# Patient Record
Sex: MALE | Race: WHITE | NOT HISPANIC OR LATINO | Employment: OTHER | URBAN - METROPOLITAN AREA
[De-identification: names, ages, dates, MRNs, and addresses within clinical notes are randomized per-mention and may not be internally consistent; named-entity substitution may affect disease eponyms.]

---

## 2017-05-15 ENCOUNTER — HOSPITAL ENCOUNTER (OUTPATIENT)
Dept: LAB | Facility: HOSPITAL | Age: 52
Discharge: HOME | End: 2017-05-15
Attending: INTERNAL MEDICINE | Admitting: INTERNAL MEDICINE

## 2017-05-15 LAB
ALBUMIN SERPL-MCNC: 3.9 G/DL (ref 3.5–4.8)
ALBUMIN/GLOB SERPL: 1.2 {RATIO} (ref 1–1.7)
ALP SERPL-CCNC: 76 IU/L (ref 32–91)
ALT SERPL-CCNC: 21 IU/L (ref 17–63)
ANION GAP SERPL CALC-SCNC: 13.7 MMOL/L (ref 10–20)
AST SERPL-CCNC: 20 IU/L (ref 15–41)
BASOPHILS # BLD AUTO: 0.1 10*3/UL (ref 0–0.2)
BASOPHILS NFR BLD AUTO: 1 % (ref 0–2)
BILIRUB SERPL-MCNC: 0.6 MG/DL (ref 0.3–1.2)
BUN SERPL-MCNC: 11 MG/DL (ref 8–20)
BUN/CREAT SERPL: 10 (ref 6.2–20.3)
CALCIUM SERPL-MCNC: 9.4 MG/DL (ref 8.9–10.3)
CHLORIDE SERPL-SCNC: 104 MMOL/L (ref 101–111)
CONV CO2: 26 MMOL/L (ref 22–32)
CONV TOTAL PROTEIN: 7.1 G/DL (ref 6.1–7.9)
CREAT UR-MCNC: 1.1 MG/DL (ref 0.7–1.2)
DIFFERENTIAL METHOD BLD: (no result)
EOSINOPHIL # BLD AUTO: 0.1 10*3/UL (ref 0–0.3)
EOSINOPHIL # BLD AUTO: 1 % (ref 0–3)
ERYTHROCYTE [DISTWIDTH] IN BLOOD BY AUTOMATED COUNT: 13.1 % (ref 11.5–14.5)
GLOBULIN UR ELPH-MCNC: 3.2 G/DL (ref 2.5–3.8)
GLUCOSE SERPL-MCNC: 114 MG/DL (ref 65–99)
HCT VFR BLD AUTO: 41.6 % (ref 40–54)
HGB BLD-MCNC: 14 G/DL (ref 14–18)
LYMPHOCYTES # BLD AUTO: 1.4 10*3/UL (ref 0.8–4.8)
LYMPHOCYTES NFR BLD AUTO: 14 % (ref 18–42)
MCH RBC QN AUTO: 29 PG (ref 26–32)
MCHC RBC AUTO-ENTMCNC: 33.7 G/DL (ref 32–36)
MCV RBC AUTO: 86.1 FL (ref 80–94)
MONOCYTES # BLD AUTO: 0.8 10*3/UL (ref 0.1–1.3)
MONOCYTES NFR BLD AUTO: 8 % (ref 2–11)
NEUTROPHILS # BLD AUTO: 7.7 10*3/UL (ref 2.3–8.6)
NEUTROPHILS NFR BLD AUTO: 76 % (ref 50–75)
NRBC BLD AUTO-RTO: 0 /100{WBCS}
NRBC/RBC NFR BLD MANUAL: 0 10*3/UL
PLATELET # BLD AUTO: 265 10*3/UL (ref 150–450)
PMV BLD AUTO: 8.6 FL (ref 7.4–10.4)
POTASSIUM SERPL-SCNC: 3.7 MMOL/L (ref 3.6–5.1)
RBC # BLD AUTO: 4.83 10*6/UL (ref 4.6–6)
SODIUM SERPL-SCNC: 140 MMOL/L (ref 136–144)
WBC # BLD AUTO: 9.9 10*3/UL (ref 4.5–11.5)

## 2017-12-04 ENCOUNTER — HOSPITAL ENCOUNTER (OUTPATIENT)
Dept: SLEEP MEDICINE | Facility: HOSPITAL | Age: 52
Discharge: HOME | End: 2017-12-04
Attending: PSYCHIATRY & NEUROLOGY | Admitting: PSYCHIATRY & NEUROLOGY

## 2017-12-13 ENCOUNTER — HOSPITAL ENCOUNTER (OUTPATIENT)
Dept: SLEEP MEDICINE | Facility: HOSPITAL | Age: 52
Discharge: HOME | End: 2017-12-13
Attending: PSYCHIATRY & NEUROLOGY | Admitting: PSYCHIATRY & NEUROLOGY

## 2018-07-06 ENCOUNTER — HOSPITAL ENCOUNTER (OUTPATIENT)
Dept: NEUROLOGY | Facility: HOSPITAL | Age: 53
Discharge: HOME | End: 2018-07-06
Attending: INTERNAL MEDICINE | Admitting: INTERNAL MEDICINE

## 2018-07-06 ENCOUNTER — OUTSIDE FACILITY SERVICE (OUTPATIENT)
Dept: NEUROLOGY | Facility: CLINIC | Age: 53
End: 2018-07-06

## 2018-07-06 PROCEDURE — 95886 MUSC TEST DONE W/N TEST COMP: CPT | Performed by: PSYCHIATRY & NEUROLOGY

## 2018-07-06 PROCEDURE — 95910 NRV CNDJ TEST 7-8 STUDIES: CPT | Performed by: PSYCHIATRY & NEUROLOGY

## 2019-05-10 ENCOUNTER — CONVERSION ENCOUNTER (OUTPATIENT)
Dept: FAMILY MEDICINE CLINIC | Facility: CLINIC | Age: 54
End: 2019-05-10

## 2019-06-04 VITALS
HEIGHT: 70 IN | WEIGHT: 237.4 LBS | RESPIRATION RATE: 20 BRPM | HEART RATE: 82 BPM | SYSTOLIC BLOOD PRESSURE: 114 MMHG | DIASTOLIC BLOOD PRESSURE: 82 MMHG | BODY MASS INDEX: 33.99 KG/M2

## 2019-06-06 NOTE — PROGRESS NOTES
Vital Signs:    Patient Profile:    54 Years Old Male  Height:     70 inches  Weight:     237.4 pounds  BMI:        34.06     Temp:       97.9 degrees F oral  Pulse rate: 82 / minute  Resp:       20 per minute  BP Sittin / 82  (left arm)    Cuff size:  large      Problems: Active problems were reviewed with the patient during this visit.  Medications: Medications were reviewed with the patient during this visit.  Allergies: Allergies were reviewed with the patient during this visit.        Vitals Entered By: Mayi Sanches CMA (May 10, 2019 9:03 AM)      Referring Provider:  Lynnette Tomlinson MD  Primary Provider:  Lynnette Tomlinson    CC:  3 mos f/u of ADD and depression.    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 Pederson)             Past Surgical History:     Reviewed history from 2017 and no changes required:        Left eye surgery, Cogan's dystrophy        Cardiac cath., normal. E.F. 55-60% ()         Cardiac Ablation 2017    Family History Summary:      Reviewed history Last on 2019 and no changes required:2019  Brother - Has Family History of Other Medical Problems - car accident () - Entered On: 2017  Half Sister - Has Family History of Other Medical Problems - glaucoma - Entered On: 2015  Half Brother - Has Family History of Heart Disease - Entered On: 2015  Mother - Has Family  History of Other Medical Problems - brain tumor, glaucoma - Entered On: 9/22/2017  Son - Has Family History of Other Medical Problems - narcolepsy  - Entered On: 11/19/2015  Mother - Has Family History of Diabetes - Entered On: 9/22/2017  Father - Has Family History of High Cholesterol - Entered On: 9/22/2017  Father - Has Family History of Hypertension - Entered On: 11/19/2015  Father - Has Family History of Depression - Entered On: 9/22/2017  Mother - Has Family History of Heart Disease - CABG ~ age 45 - Entered On: 9/22/2017    General Comments - FH:  Children: 1 daughter - healthy  Siblings: 1 half sister - healthy    Social History:     Reviewed history from 07/13/2018 and no changes required:        Patient has never smoked.        Passive Smoke: Y        Alcohol Use: Y        Drug Use: N        HIV/High Risk: N        Regular Exercise: N        Hx Domestic Abuse: N        Voodoo Affecting Care: N                Alcohol Use: Y        Risk Factors:     Smoked Tobacco Use:  Never smoker  Smokeless Tobacco Use:  Never  Passive smoke exposure:  yes  Drug use:  no  HIV high-risk behavior:  no  Caffeine use:  0 drinks per day  Alcohol use:  yes     Drinks per day:  social     Has patient --        Felt need to cut down:  no        Been annoyed by complaints:  no        Felt guilty about drinking:  no        Needed eye opener in the morning:  no     Counseled to quit/cut down alcohol use:  no  Exercise:  no  Seatbelt use:  100 %  Sun Exposure:  occasionally    Previous Tobacco Use: Signed On - 02/26/2019  Smoked Tobacco Use:  Never smoker  Smokeless Tobacco Use:  Never  Passive smoke exposure:  yes  Drug use:  no  HIV high-risk behavior:  no  Caffeine use:  0 drinks per day    Previous Alcohol Use: Signed On - 07/13/2018  Alcohol use:  yes     Drinks per day:  social     Has patient --        Felt need to cut down:  no        Been annoyed by complaints:  no        Felt guilty about drinking:  no        Needed eye  opener in the morning:  no     Counseled to quit/cut down alcohol use:  no  Exercise:  no  Seatbelt use:  100 %  Sun Exposure:  occasionally        Physical Exam   Height:  70  Weight:  238  BP:  114/82 mm HG    Medication List:  ADDERALL 10 MG ORAL TABLET (AMPHETAMINE-DEXTROAMPHETAMINE) one tab daily in PM  AZELASTINE HCL 0.15 % NASAL SOLUTION (AZELASTINE HCL) 1-2 sprays in each nostril twice a day  FLONASE ALLERGY RELIEF 50 MCG/ACT NASAL SUSPENSION (FLUTICASONE PROPIONATE) 2 sprays once a day  GABAPENTIN 100MG CAPSULES (GABAPENTIN) TAKE 1 CAPSULE BY MOUTH THREE TIMES DAILY  LOSARTAN 100MG TABLETS (LOSARTAN POTASSIUM) TAKE 1 TABLET BY MOUTH EVERY DAY.  TAMSULOSIN 0.4MG CAPSULES (TAMSULOSIN HCL) TAKE 1 CAPSULE BY MOUTH EVERY DAY  TRINTELLIX 20MG TB (WAS BRINTELLIX) (VORTIOXETINE HBR) TAKE 1 TABLET BY MOUTH EVERY DAY  KLONOPIN 1 MG ORAL TABLET (CLONAZEPAM) one tab at bedtime as needed  ADDERALL XR 30 MG ORAL CAPSULE EXTENDED RELEASE 24 HOUR (AMPHETAMINE-DEXTROAMPHETAMINE) one capsule daily in the morning  [facility] OMEPRAZOLE 20 MG ORAL TABLET DELAYED RELEASE (OMEPRAZOLE) 1 tablet qd      Surgical History   Left eye surgery, Cogan's dystrophy  Cardiac cath., normal. E.F. 55-60% (2011)   Cardiac Ablation 5/2017,    Risk Factors  Tobacco Use: Never smoker  Passive smoke exposure: yes  Exercise: no  Illicit Drug use: no      Physical Examination   General Appearance   In no acute distress.  Alert & oriented.  Behavior and affect appropriate to situation  HEENT   PERRLA, EOMI, TM's normal.  Pharynx clear  Cardiovascular   Regular rate and rhythm  Lungs   Clear to auscultation        Impression & Recommendations:    Problem # 1:  DEPRESSION (ICD-296.30) (ILU18-T55.9)  Assessment: Unchanged    Problem # 2:  ADD (ICD-314.00) (ZVD89-S69.0)  Assessment: Deteriorated  add adderall 10pm qpm    Problem # 3:  HTN (ICD-401.9) (TFM80-D31)  Assessment: Unchanged  he has not been told by pharmacy about recall  advised pt to f/u with  pharmacist  His updated medication list for this problem includes:     Losartan 100mg Tablets (Losartan potassium) ..... Take 1 tablet by mouth every day.      Medications Added to Medication List This Visit:  1)  Adderall 10 Mg Oral Tablet (Amphetamine-dextroamphetamine) .... One tab daily in pm  2)  Flonase Allergy Relief 50 Mcg/act Nasal Suspension (Fluticasone propionate) .... 2 sprays once a day      Patient Instructions:  1)  During this office visit we discussed the pertinent aspects of the visit and the treatment recommendations.  Patient was given the opportunity to ask questions and discuss other concerns.                Medication Administration    Orders Added:  1)  Ofc Vst, Est Level III [07961]        Electronically signed by Lynnette Tomlinson on 05/28/2019 at 6:15 PM  ________________________________________________________________________       Disclaimer: Converted Note message may not contain all data elements that existed in the legacy source system. Please see [employer] Legacy System for the original note details.

## 2019-06-17 ENCOUNTER — OFFICE VISIT (OUTPATIENT)
Dept: FAMILY MEDICINE CLINIC | Facility: CLINIC | Age: 54
End: 2019-06-17

## 2019-06-17 VITALS
OXYGEN SATURATION: 97 % | BODY MASS INDEX: 34.35 KG/M2 | SYSTOLIC BLOOD PRESSURE: 144 MMHG | WEIGHT: 245.4 LBS | HEIGHT: 71 IN | DIASTOLIC BLOOD PRESSURE: 92 MMHG | TEMPERATURE: 98.2 F | RESPIRATION RATE: 20 BRPM | HEART RATE: 79 BPM

## 2019-06-17 DIAGNOSIS — I10 ESSENTIAL HYPERTENSION: ICD-10-CM

## 2019-06-17 DIAGNOSIS — N52.9 ERECTILE DYSFUNCTION, UNSPECIFIED ERECTILE DYSFUNCTION TYPE: Primary | ICD-10-CM

## 2019-06-17 PROCEDURE — 99213 OFFICE O/P EST LOW 20 MIN: CPT | Performed by: INTERNAL MEDICINE

## 2019-06-17 RX ORDER — LOTEPREDNOL ETABONATE 5 MG/G
GEL OPHTHALMIC
Refills: 3 | COMMUNITY
Start: 2019-05-30 | End: 2019-06-17

## 2019-06-17 RX ORDER — LOSARTAN POTASSIUM 100 MG/1
TABLET ORAL EVERY 24 HOURS
COMMUNITY
Start: 2017-12-27 | End: 2020-04-09

## 2019-06-17 RX ORDER — DEXTROAMPHETAMINE SACCHARATE, AMPHETAMINE ASPARTATE, DEXTROAMPHETAMINE SULFATE AND AMPHETAMINE SULFATE 2.5; 2.5; 2.5; 2.5 MG/1; MG/1; MG/1; MG/1
TABLET ORAL
COMMUNITY
Start: 2019-05-10 | End: 2019-06-18 | Stop reason: SDUPTHER

## 2019-06-17 RX ORDER — GABAPENTIN 100 MG/1
CAPSULE ORAL
Refills: 1 | COMMUNITY
Start: 2019-04-28 | End: 2019-06-17

## 2019-06-17 RX ORDER — FLUTICASONE PROPIONATE 50 UG/1
2 SPRAY, METERED NASAL DAILY PRN
Refills: 2 | COMMUNITY
Start: 2019-04-11 | End: 2021-06-04

## 2019-06-17 RX ORDER — TAMSULOSIN HYDROCHLORIDE 0.4 MG/1
1 CAPSULE ORAL EVERY 24 HOURS
COMMUNITY
Start: 2018-03-02 | End: 2019-10-02 | Stop reason: SDUPTHER

## 2019-06-17 RX ORDER — NICOTINE POLACRILEX 4 MG/1
20 GUM, CHEWING ORAL DAILY
COMMUNITY
Start: 2015-11-17 | End: 2021-02-18

## 2019-06-17 RX ORDER — ALBUTEROL SULFATE 90 UG/1
AEROSOL, METERED RESPIRATORY (INHALATION)
COMMUNITY
Start: 2018-12-30 | End: 2021-02-18

## 2019-06-17 RX ORDER — CLONAZEPAM 1 MG/1
TABLET ORAL EVERY 24 HOURS
COMMUNITY
Start: 2017-09-22 | End: 2019-07-24 | Stop reason: SDUPTHER

## 2019-06-17 RX ORDER — GABAPENTIN 100 MG/1
CAPSULE ORAL
COMMUNITY
Start: 2018-09-27 | End: 2019-07-18 | Stop reason: SDUPTHER

## 2019-06-17 RX ORDER — DEXTROAMPHETAMINE SACCHARATE, AMPHETAMINE ASPARTATE MONOHYDRATE, DEXTROAMPHETAMINE SULFATE AND AMPHETAMINE SULFATE 7.5; 7.5; 7.5; 7.5 MG/1; MG/1; MG/1; MG/1
1 CAPSULE, EXTENDED RELEASE ORAL EVERY 24 HOURS
COMMUNITY
Start: 2017-07-13 | End: 2019-06-18 | Stop reason: SDUPTHER

## 2019-06-17 NOTE — PATIENT INSTRUCTIONS
Erectile Dysfunction  Erectile dysfunction (ED) is the inability to get or keep an erection in order to have sexual intercourse. Erectile dysfunction may include:  · Inability to get an erection.  · Lack of enough hardness of the erection to allow penetration.  · Loss of the erection before sex is finished.    What are the causes?  This condition may be caused by:  · Certain medicines, such as:  ? Pain relievers.  ? Antihistamines.  ? Antidepressants.  ? Blood pressure medicines.  ? Water pills (diuretics).  ? Ulcer medicines.  ? Muscle relaxants.  ? Drugs.  · Excessive drinking.  · Psychological causes, such as:  ? Anxiety.  ? Depression.  ? Sadness.  ? Exhaustion.  ? Performance fear.  ? Stress.  · Physical causes, such as:  ? Artery problems. This may include diabetes, smoking, liver disease, or atherosclerosis.  ? High blood pressure.  ? Hormonal problems, such as low testosterone.  ? Obesity.  ? Nerve problems. This may include back or pelvic injuries, diabetes mellitus, multiple sclerosis, or Parkinson disease.    What are the signs or symptoms?  Symptoms of this condition include:  · Inability to get an erection.  · Lack of enough hardness of the erection to allow penetration.  · Loss of the erection before sex is finished.  · Normal erections at some times, but with frequent unsatisfactory episodes.  · Low sexual satisfaction in either partner due to erection problems.  · A curved penis occurring with erection. The curve may cause pain or the penis may be too curved to allow for intercourse.  · Never having nighttime erections.    How is this diagnosed?  This condition is often diagnosed by:  · Performing a physical exam to find other diseases or specific problems with the penis.  · Asking you detailed questions about the problem.  · Performing blood tests to check for diabetes mellitus or to measure hormone levels.  · Performing other tests to check for underlying health conditions.  · Performing an  ultrasound exam to check for scarring.  · Performing a test to check blood flow to the penis.  · Doing a sleep study at home to measure nighttime erections.    How is this treated?  This condition may be treated by:  · Medicine taken by mouth to help you achieve an erection (oral medicine).  · Hormone replacement therapy to replace low testosterone levels.  · Medicine that is injected into the penis. Your health care provider may instruct you how to give yourself these injections at home.  · Vacuum pump. This is a pump with a ring on it. The pump and ring are placed on the penis and used to create pressure that helps the penis become erect.  · Penile implant surgery. In this procedure, you may receive:  ? An inflatable implant. This consists of cylinders, a pump, and a reservoir. The cylinders can be inflated with a fluid that helps to create an erection, and they can be deflated after intercourse.  ? A semi-rigid implant. This consists of two silicone rubber rods. The rods provide some rigidity. They are also flexible, so the penis can both curve downward in its normal position and become straight for sexual intercourse.  · Blood vessel surgery, to improve blood flow to the penis. During this procedure, a blood vessel from a different part of the body is placed into the penis to allow blood to flow around (bypass) damaged or blocked blood vessels.  · Lifestyle changes, such as exercising more, losing weight, and quitting smoking.    Follow these instructions at home:  Medicines  · Take over-the-counter and prescription medicines only as told by your health care provider. Do not increase the dosage without first discussing it with your health care provider.  · If you are using self-injections, perform injections as directed by your health care provider. Make sure to avoid any veins that are on the surface of the penis. After giving an injection, apply pressure to the injection site for 5 minutes.  General  instructions  · Exercise regularly, as directed by your health care provider. Work with your health care provider to lose weight, if needed.  · Do not use any products that contain nicotine or tobacco, such as cigarettes and e-cigarettes. If you need help quitting, ask your health care provider.  · Before using a vacuum pump, read the instructions that come with the pump and discuss any questions with your health care provider.  · Keep all follow-up visits as told by your health care provider. This is important.  Contact a health care provider if:  · You feel nauseous.  · You vomit.  Get help right away if:  · You are taking oral or injectable medicines and you have an erection that lasts longer than 4 hours. If your health care provider is unavailable, go to the nearest emergency room for evaluation. An erection that lasts much longer than 4 hours can result in permanent damage to your penis.  · You have severe pain in your groin or abdomen.  · You develop redness or severe swelling of your penis.  · You have redness spreading up into your groin or lower abdomen.  · You are unable to urinate.  · You experience chest pain or a rapid heart beat (palpitations) after taking oral medicines.  Summary  · Erectile dysfunction (ED) is the inability to get or keep an erection during sexual intercourse. This problem can usually be treated successfully.  · This condition is diagnosed based on a physical exam, your symptoms, and tests to determine the cause. Treatment varies depending on the cause, and may include medicines, hormone therapy, surgery, or vacuum pump.  · You may need follow-up visits to make sure that you are using your medicines or devices correctly.  · Get help right away if you are taking or injecting medicines and you have an erection that lasts longer than 4 hours.  This information is not intended to replace advice given to you by your health care provider. Make sure you discuss any questions you have with  your health care provider.  Document Released: 12/15/2001 Document Revised: 01/03/2018 Document Reviewed: 01/03/2018  Elsevier Interactive Patient Education © 2019 [employer]

## 2019-06-17 NOTE — PROGRESS NOTES
Subjective   Choco Bryant is a 54 y.o. male.     Pt c/o erectile dysfunction. Has trouble with both starting and maintaining erection. Has been occurring for some time, but worse recently. Only new med is addition of adderall in PM, which does seem to be helping with concentration.    In addition to ED, has trouble with depression, motivation. Meds help, but has not had testosterone checked before.    BP has been well controlled. No chest pain, SOA, headaches, dizziness.         The following portions of the patient's history were reviewed and updated as appropriate: allergies, current medications, past family history, past medical history, past social history, past surgical history and problem list.    Review of Systems   Constitutional: Negative for fatigue and fever.   HENT: Negative for congestion, ear pain, rhinorrhea and sore throat.    Eyes: Negative for blurred vision and itching.   Respiratory: Negative for cough and shortness of breath.    Cardiovascular: Negative for chest pain and palpitations.   Gastrointestinal: Negative for abdominal pain, diarrhea and vomiting.   Endocrine: Negative for polydipsia and polyuria.   Genitourinary: Positive for decreased libido and erectile dysfunction. Negative for dysuria, frequency, hematuria and urgency.   Musculoskeletal: Negative for joint swelling and myalgias.   Skin: Negative for rash and skin lesions.   Neurological: Negative for dizziness, numbness and headache.   Psychiatric/Behavioral: Positive for decreased concentration and depressed mood. The patient is nervous/anxious.          Current Outpatient Medications:   •  albuterol sulfate HFA (PROAIR HFA) 108 (90 Base) MCG/ACT inhaler, INL 2 PFS PO Q 4 H FOR UP TO 3 DAYS PRF WHZ OR SOB, Disp: , Rfl:   •  amphetamine-dextroamphetamine (ADDERALL) 10 MG tablet, ADDERALL 10 MG TABS, Disp: , Rfl:   •  amphetamine-dextroamphetamine XR (ADDERALL XR) 30 MG 24 hr capsule, 1 capsule Daily, Disp: , Rfl:   •  clonazePAM  "(KLONOPIN) 1 MG tablet, Daily., Disp: , Rfl:   •  FLONASE ALLERGY RELIEF 50 MCG/ACT nasal spray, USE 2 SPRAYS IN EACH NOSTRIL QD, Disp: , Rfl: 2  •  gabapentin (NEURONTIN) 100 MG capsule, TK 1 C PO TID, Disp: , Rfl:   •  losartan (COZAAR) 100 MG tablet, Daily., Disp: , Rfl:   •  Omeprazole ([facility]S OMEPRAZOLE) 20 MG tablet delayed-release, [facility]S OMEPRAZOLE 20 MG TBEC, Disp: , Rfl:   •  tamsulosin (FLOMAX) 0.4 MG capsule 24 hr capsule, 1 capsule Daily., Disp: , Rfl:   •  Vortioxetine HBr (TRINTELLIX) 20 MG tablet, TK 1 T PO QD, Disp: , Rfl:     Objective   /92 (BP Location: Left arm, Patient Position: Sitting, Cuff Size: Large Adult)   Pulse 79   Temp 98.2 °F (36.8 °C) (Oral)   Resp 20   Ht 179.1 cm (70.5\")   Wt 111 kg (245 lb 6.4 oz)   SpO2 97%   BMI 34.71 kg/m²   Physical Exam   Constitutional: He is oriented to person, place, and time. He appears well-developed and well-nourished. No distress.   HENT:   Head: Atraumatic.   Mouth/Throat: Oropharynx is clear and moist. No oropharyngeal exudate.   Eyes: Conjunctivae and EOM are normal. Right eye exhibits no discharge. Left eye exhibits no discharge. No scleral icterus.   Neck: Normal range of motion. Neck supple.   Cardiovascular: Normal rate, regular rhythm and normal heart sounds. Exam reveals no gallop and no friction rub.   No murmur heard.  Pulmonary/Chest: Effort normal and breath sounds normal. No stridor. No respiratory distress. He has no wheezes.   Musculoskeletal: Normal range of motion. He exhibits no edema or deformity.   Neurological: He is alert and oriented to person, place, and time.   Skin: Skin is warm and dry. No rash noted. He is not diaphoretic. No erythema.   Psychiatric: He has a normal mood and affect. His behavior is normal. Thought content normal.         Assessment/Plan   Problems Addressed this Visit     None      Visit Diagnoses     Erectile dysfunction, unspecified erectile dysfunction type    -  Primary    Relevant Orders    CBC " w AUTO Differential    PSA SCREENING    Testosterone (Free & Total), LC / MS    Essential hypertension        Relevant Medications    losartan (COZAAR) 100 MG tablet    Other Relevant Orders    CBC w AUTO Differential    Comprehensive metabolic panel    Lipid panel    TSH        1) HTN - repeat bp improved to 130/80. Cont current meds. Check labs.  2) ED - check labs. bc of several sxs of hypogonadism, if low, then will start testosterone injections. Discussed increased risk of heart disease and prostate issues, etc, with pt. If testosterone is normal, then will try viagra.         Procedures

## 2019-06-18 RX ORDER — DEXTROAMPHETAMINE SACCHARATE, AMPHETAMINE ASPARTATE MONOHYDRATE, DEXTROAMPHETAMINE SULFATE AND AMPHETAMINE SULFATE 7.5; 7.5; 7.5; 7.5 MG/1; MG/1; MG/1; MG/1
30 CAPSULE, EXTENDED RELEASE ORAL EVERY MORNING
Qty: 30 CAPSULE | Refills: 0 | Status: SHIPPED | OUTPATIENT
Start: 2019-06-18 | End: 2019-07-24 | Stop reason: SDUPTHER

## 2019-06-27 ENCOUNTER — LAB (OUTPATIENT)
Dept: LAB | Facility: HOSPITAL | Age: 54
End: 2019-06-27

## 2019-06-27 ENCOUNTER — TELEPHONE (OUTPATIENT)
Dept: FAMILY MEDICINE CLINIC | Facility: CLINIC | Age: 54
End: 2019-06-27

## 2019-06-27 DIAGNOSIS — N52.9 ERECTILE DYSFUNCTION, UNSPECIFIED ERECTILE DYSFUNCTION TYPE: ICD-10-CM

## 2019-06-27 DIAGNOSIS — I10 ESSENTIAL HYPERTENSION: ICD-10-CM

## 2019-06-27 LAB
ALBUMIN SERPL-MCNC: 3.7 G/DL (ref 3.5–4.8)
ALBUMIN/GLOB SERPL: 1.4 G/DL (ref 1–1.7)
ALP SERPL-CCNC: 90 U/L (ref 32–91)
ALT SERPL W P-5'-P-CCNC: 27 U/L (ref 17–63)
ANION GAP SERPL CALCULATED.3IONS-SCNC: 14.4 MMOL/L (ref 10–20)
ARTICHOKE IGE QN: 185 MG/DL (ref 0–100)
AST SERPL-CCNC: 17 U/L (ref 15–41)
BASOPHILS # BLD AUTO: 0 10*3/MM3 (ref 0–0.2)
BASOPHILS NFR BLD AUTO: 0.7 % (ref 0–1.5)
BILIRUB SERPL-MCNC: 0.7 MG/DL (ref 0.3–1.2)
BUN BLD-MCNC: 12 MG/DL (ref 8–20)
BUN/CREAT SERPL: 10.9 (ref 6.2–20.3)
CALCIUM SPEC-SCNC: 9.5 MG/DL (ref 8.9–10.3)
CHLORIDE SERPL-SCNC: 104 MMOL/L (ref 101–111)
CHOLEST SERPL-MCNC: 246 MG/DL
CO2 SERPL-SCNC: 25 MMOL/L (ref 22–32)
CREAT BLD-MCNC: 1.1 MG/DL (ref 0.7–1.2)
DEPRECATED RDW RBC AUTO: 40.7 FL (ref 37–54)
EOSINOPHIL # BLD AUTO: 0.1 10*3/MM3 (ref 0–0.4)
EOSINOPHIL NFR BLD AUTO: 1.7 % (ref 0.3–6.2)
ERYTHROCYTE [DISTWIDTH] IN BLOOD BY AUTOMATED COUNT: 13.3 % (ref 12.3–15.4)
GFR SERPL CREATININE-BSD FRML MDRD: 70 ML/MIN/1.73
GLOBULIN UR ELPH-MCNC: 2.6 GM/DL (ref 2.5–3.8)
GLUCOSE BLD-MCNC: 86 MG/DL (ref 65–99)
HCT VFR BLD AUTO: 42.3 % (ref 37.5–51)
HDLC SERPL QL: 6.15
HDLC SERPL-MCNC: 40 MG/DL
HGB BLD-MCNC: 14.5 G/DL (ref 13–17.7)
LDLC/HDLC SERPL: 4.39 {RATIO}
LYMPHOCYTES # BLD AUTO: 1.7 10*3/MM3 (ref 0.7–3.1)
LYMPHOCYTES NFR BLD AUTO: 27.9 % (ref 19.6–45.3)
MCH RBC QN AUTO: 29.6 PG (ref 26.6–33)
MCHC RBC AUTO-ENTMCNC: 34.2 G/DL (ref 31.5–35.7)
MCV RBC AUTO: 86.3 FL (ref 79–97)
MONOCYTES # BLD AUTO: 0.6 10*3/MM3 (ref 0.1–0.9)
MONOCYTES NFR BLD AUTO: 9.9 % (ref 5–12)
NEUTROPHILS # BLD AUTO: 3.6 10*3/MM3 (ref 1.7–7)
NEUTROPHILS NFR BLD AUTO: 59.8 % (ref 42.7–76)
NRBC BLD AUTO-RTO: 0.2 /100 WBC (ref 0–0.2)
PLATELET # BLD AUTO: 270 10*3/MM3 (ref 140–450)
PMV BLD AUTO: 8.4 FL (ref 6–12)
POTASSIUM BLD-SCNC: 4.4 MMOL/L (ref 3.6–5.1)
PROT SERPL-MCNC: 6.3 G/DL (ref 6.1–7.9)
PSA SERPL-MCNC: 1.34 NG/ML (ref 0–4)
RBC # BLD AUTO: 4.9 10*6/MM3 (ref 4.14–5.8)
SODIUM BLD-SCNC: 139 MMOL/L (ref 136–144)
TRIGL SERPL-MCNC: 153 MG/DL
TSH SERPL DL<=0.05 MIU/L-ACNC: 2.84 MIU/ML (ref 0.34–5.6)
VLDLC SERPL-MCNC: 30.6 MG/DL
WBC NRBC COR # BLD: 6.1 10*3/MM3 (ref 3.4–10.8)

## 2019-06-27 PROCEDURE — 80061 LIPID PANEL: CPT

## 2019-06-27 PROCEDURE — 36415 COLL VENOUS BLD VENIPUNCTURE: CPT

## 2019-06-27 PROCEDURE — 84403 ASSAY OF TOTAL TESTOSTERONE: CPT

## 2019-06-27 PROCEDURE — 85025 COMPLETE CBC W/AUTO DIFF WBC: CPT

## 2019-06-27 PROCEDURE — G0103 PSA SCREENING: HCPCS

## 2019-06-27 PROCEDURE — 84443 ASSAY THYROID STIM HORMONE: CPT

## 2019-06-27 PROCEDURE — 80053 COMPREHEN METABOLIC PANEL: CPT

## 2019-06-27 PROCEDURE — 84402 ASSAY OF FREE TESTOSTERONE: CPT

## 2019-06-27 RX ORDER — PRAVASTATIN SODIUM 40 MG
40 TABLET ORAL DAILY
Qty: 90 TABLET | Refills: 0 | Status: SHIPPED | OUTPATIENT
Start: 2019-06-27 | End: 2019-09-24 | Stop reason: SDUPTHER

## 2019-06-27 NOTE — TELEPHONE ENCOUNTER
Pt agrees with you on the cholesterol medication and would like it sent to [facility] on [address]. I already put pharmacy in chart.

## 2019-06-27 NOTE — TELEPHONE ENCOUNTER
Please let pt know cholesterol is quite high - the rest of his labs look fine. Given how crazy his life is right now, I doubt he'll have much time for diet/exercise. We can try that for a few months if he would like, but otherwise, I think we should start cholesterol medication.

## 2019-07-01 LAB
TESTOST FREE SERPL-MCNC: 8.6 PG/ML (ref 7.2–24)
TESTOST SERPL-MCNC: 435.8 NG/DL (ref 264–916)

## 2019-07-02 ENCOUNTER — TELEPHONE (OUTPATIENT)
Dept: FAMILY MEDICINE CLINIC | Facility: CLINIC | Age: 54
End: 2019-07-02

## 2019-07-18 RX ORDER — GABAPENTIN 100 MG/1
CAPSULE ORAL
Qty: 270 CAPSULE | Refills: 0 | Status: SHIPPED | OUTPATIENT
Start: 2019-07-18 | End: 2020-11-09

## 2019-07-24 ENCOUNTER — OFFICE VISIT (OUTPATIENT)
Dept: FAMILY MEDICINE CLINIC | Facility: CLINIC | Age: 54
End: 2019-07-24

## 2019-07-24 VITALS
RESPIRATION RATE: 8 BRPM | HEART RATE: 80 BPM | DIASTOLIC BLOOD PRESSURE: 80 MMHG | BODY MASS INDEX: 34.37 KG/M2 | TEMPERATURE: 98.7 F | SYSTOLIC BLOOD PRESSURE: 118 MMHG | WEIGHT: 243 LBS

## 2019-07-24 DIAGNOSIS — E78.5 HYPERLIPIDEMIA, UNSPECIFIED HYPERLIPIDEMIA TYPE: ICD-10-CM

## 2019-07-24 DIAGNOSIS — F98.8 ATTENTION DEFICIT DISORDER (ADD) WITHOUT HYPERACTIVITY: ICD-10-CM

## 2019-07-24 DIAGNOSIS — F51.04 CHRONIC INSOMNIA: ICD-10-CM

## 2019-07-24 DIAGNOSIS — F33.1 MODERATE EPISODE OF RECURRENT MAJOR DEPRESSIVE DISORDER (HCC): Primary | ICD-10-CM

## 2019-07-24 PROCEDURE — 99214 OFFICE O/P EST MOD 30 MIN: CPT | Performed by: INTERNAL MEDICINE

## 2019-07-24 RX ORDER — DEXTROAMPHETAMINE SACCHARATE, AMPHETAMINE ASPARTATE MONOHYDRATE, DEXTROAMPHETAMINE SULFATE AND AMPHETAMINE SULFATE 7.5; 7.5; 7.5; 7.5 MG/1; MG/1; MG/1; MG/1
30 CAPSULE, EXTENDED RELEASE ORAL EVERY MORNING
Qty: 30 CAPSULE | Refills: 0 | Status: SHIPPED | OUTPATIENT
Start: 2019-07-24 | End: 2019-09-06

## 2019-07-24 RX ORDER — CLONAZEPAM 2 MG/1
2 TABLET ORAL NIGHTLY PRN
Qty: 30 TABLET | Refills: 1 | Status: SHIPPED | OUTPATIENT
Start: 2019-07-24 | End: 2019-11-20 | Stop reason: SDUPTHER

## 2019-07-24 RX ORDER — DEXTROAMPHETAMINE SACCHARATE, AMPHETAMINE ASPARTATE, DEXTROAMPHETAMINE SULFATE AND AMPHETAMINE SULFATE 2.5; 2.5; 2.5; 2.5 MG/1; MG/1; MG/1; MG/1
10 TABLET ORAL DAILY
Qty: 30 TABLET | Refills: 0 | Status: SHIPPED | OUTPATIENT
Start: 2019-07-24 | End: 2019-10-25 | Stop reason: SDUPTHER

## 2019-07-24 NOTE — PROGRESS NOTES
Subjective   Choco Bryant is a 54 y.o. male.     Pt is here to f/u labs from last visit, ED, htn, hpld, depression, add 2/2 depression, chronic insomnia  Still having trouble with ED  However, since wife is off med to increase libido, isn't much of an issue at this point  Is having more trouble with insomnia  Klonopin 1mg doesn't seem to be as effective - needs to take 2  And if he takes it too late, is groggy in the morning  Having more trouble with depression  Thinks a big factor is not liking his job  And his productivity is down, so was told to improve that or risk being fired  And part of him wishes that would happen so he could have a reason to do  Something he would like more  adderall also doesn't seem to be helping as much as it did before  Having more trouble with focusing and fatigue         The following portions of the patient's history were reviewed and updated as appropriate: allergies, current medications, past family history, past medical history, past social history, past surgical history and problem list.    Review of Systems   Constitutional: Positive for fatigue. Negative for fever.   HENT: Negative for congestion, ear pain, rhinorrhea and sore throat.    Eyes: Negative for blurred vision and itching.   Respiratory: Negative for cough and shortness of breath.    Cardiovascular: Negative for chest pain and palpitations.   Gastrointestinal: Negative for abdominal pain, diarrhea and vomiting.   Endocrine: Negative for polydipsia and polyuria.   Genitourinary: Positive for decreased libido and erectile dysfunction. Negative for dysuria, frequency, hematuria and urgency.   Musculoskeletal: Negative for joint swelling and myalgias.   Skin: Negative for rash and skin lesions.   Neurological: Negative for dizziness, numbness and headache.   Psychiatric/Behavioral: Positive for decreased concentration, sleep disturbance, depressed mood and stress. The patient is nervous/anxious.          Current  Outpatient Medications:   •  albuterol sulfate HFA (PROAIR HFA) 108 (90 Base) MCG/ACT inhaler, INL 2 PFS PO Q 4 H FOR UP TO 3 DAYS PRF WHZ OR SOB, Disp: , Rfl:   •  amphetamine-dextroamphetamine XR (ADDERALL XR) 30 MG 24 hr capsule, Take 1 capsule by mouth Every Morning, Disp: 30 capsule, Rfl: 0  •  clonazePAM (KLONOPIN) 1 MG tablet, Daily., Disp: , Rfl:   •  FLONASE ALLERGY RELIEF 50 MCG/ACT nasal spray, USE 2 SPRAYS IN EACH NOSTRIL QD, Disp: , Rfl: 2  •  gabapentin (NEURONTIN) 100 MG capsule, TAKE 1 CAPSULE BY MOUTH THREE TIMES DAILY, Disp: 270 capsule, Rfl: 0  •  losartan (COZAAR) 100 MG tablet, Daily., Disp: , Rfl:   •  Omeprazole ([facility]S OMEPRAZOLE) 20 MG tablet delayed-release, [facility]S OMEPRAZOLE 20 MG TBEC, Disp: , Rfl:   •  pravastatin (PRAVACHOL) 40 MG tablet, Take 1 tablet by mouth Daily., Disp: 90 tablet, Rfl: 0  •  tamsulosin (FLOMAX) 0.4 MG capsule 24 hr capsule, 1 capsule Daily., Disp: , Rfl:   •  Vortioxetine HBr (TRINTELLIX) 20 MG tablet, Take 20 mg by mouth Daily., Disp: 90 tablet, Rfl: 1    Objective   /80 (BP Location: Right arm, Patient Position: Sitting, Cuff Size: Adult)   Pulse 80   Temp 98.7 °F (37.1 °C) (Oral)   Resp 8   Wt 110 kg (243 lb)   BMI 34.37 kg/m²   Physical Exam   Constitutional: He is oriented to person, place, and time. He appears well-developed and well-nourished. No distress.   HENT:   Head: Normocephalic and atraumatic.   Mouth/Throat: Oropharynx is clear and moist. No oropharyngeal exudate.   Eyes: Conjunctivae and EOM are normal. Right eye exhibits no discharge. Left eye exhibits no discharge. No scleral icterus.   Neck: Normal range of motion. Neck supple. No thyromegaly present.   Cardiovascular: Normal rate, regular rhythm and normal heart sounds. Exam reveals no gallop and no friction rub.   No murmur heard.  Pulmonary/Chest: Effort normal and breath sounds normal. No respiratory distress. He has no wheezes. He has no rales.   Abdominal: Soft. Bowel sounds are  normal. He exhibits no distension. There is no tenderness. There is no guarding.   Musculoskeletal: Normal range of motion. He exhibits no edema or deformity.   Lymphadenopathy:     He has no cervical adenopathy.   Neurological: He is alert and oriented to person, place, and time. No cranial nerve deficit.   Skin: Skin is warm and dry. No rash noted. He is not diaphoretic. No erythema.   Psychiatric: He has a normal mood and affect. His behavior is normal. Thought content normal.   Vitals reviewed.        Assessment/Plan   Problems Addressed this Visit     None      Visit Diagnoses     Moderate episode of recurrent major depressive disorder (CMS/HCC)    -  Primary    Relevant Medications    Cariprazine HCl (VRAYLAR) 1.5 MG capsule capsule    amphetamine-dextroamphetamine XR (ADDERALL XR) 30 MG 24 hr capsule    amphetamine-dextroamphetamine (ADDERALL) 10 MG tablet    Chronic insomnia        Attention deficit disorder (ADD) without hyperactivity        Relevant Medications    Cariprazine HCl (VRAYLAR) 1.5 MG capsule capsule    amphetamine-dextroamphetamine XR (ADDERALL XR) 30 MG 24 hr capsule    amphetamine-dextroamphetamine (ADDERALL) 10 MG tablet    Hyperlipidemia, unspecified hyperlipidemia type              Will give samples of vraylar to try in combination with trintellix  Will increase klonopin to 2mg qhs prn  Will leave adderall the same - attention will likely improve with improvement in sleep and depression  Pt is tolerating pravastatin without adverse effects like myalgias, so will continue       Procedures

## 2019-08-13 ENCOUNTER — OFFICE VISIT (OUTPATIENT)
Dept: FAMILY MEDICINE CLINIC | Facility: CLINIC | Age: 54
End: 2019-08-13

## 2019-08-13 VITALS
HEIGHT: 71 IN | RESPIRATION RATE: 20 BRPM | WEIGHT: 248.6 LBS | SYSTOLIC BLOOD PRESSURE: 118 MMHG | TEMPERATURE: 98 F | BODY MASS INDEX: 34.8 KG/M2 | HEART RATE: 85 BPM | OXYGEN SATURATION: 98 % | DIASTOLIC BLOOD PRESSURE: 84 MMHG

## 2019-08-13 DIAGNOSIS — F32.A DEPRESSION, UNSPECIFIED DEPRESSION TYPE: Primary | ICD-10-CM

## 2019-08-13 DIAGNOSIS — I10 ESSENTIAL HYPERTENSION: ICD-10-CM

## 2019-08-13 PROBLEM — G62.9 PERIPHERAL NEUROPATHY: Status: ACTIVE | Noted: 2018-06-25

## 2019-08-13 PROBLEM — F98.8 ATTENTION DEFICIT DISORDER (ADD) WITHOUT HYPERACTIVITY: Status: ACTIVE | Noted: 2017-07-13

## 2019-08-13 PROBLEM — M54.50 LOW BACK PAIN: Status: ACTIVE | Noted: 2019-01-31

## 2019-08-13 PROBLEM — G47.00 INSOMNIA: Status: ACTIVE | Noted: 2017-04-07

## 2019-08-13 PROBLEM — N40.0 BENIGN PROSTATIC HYPERPLASIA: Status: ACTIVE | Noted: 2017-10-27

## 2019-08-13 PROBLEM — K42.9 UMBILICAL HERNIA: Status: ACTIVE | Noted: 2018-06-25

## 2019-08-13 PROBLEM — G47.30 SLEEP APNEA: Status: ACTIVE | Noted: 2017-10-02

## 2019-08-13 PROBLEM — M54.12 CERVICAL RADICULOPATHY: Status: ACTIVE | Noted: 2018-06-25

## 2019-08-13 PROCEDURE — 99213 OFFICE O/P EST LOW 20 MIN: CPT | Performed by: INTERNAL MEDICINE

## 2019-08-13 RX ORDER — LOTEPREDNOL ETABONATE 5 MG/G
GEL OPHTHALMIC
Refills: 3 | COMMUNITY
Start: 2019-07-15 | End: 2020-11-09

## 2019-09-06 ENCOUNTER — TELEPHONE (OUTPATIENT)
Dept: FAMILY MEDICINE CLINIC | Facility: CLINIC | Age: 54
End: 2019-09-06

## 2019-09-06 RX ORDER — DEXTROAMPHETAMINE SACCHARATE, AMPHETAMINE ASPARTATE MONOHYDRATE, DEXTROAMPHETAMINE SULFATE AND AMPHETAMINE SULFATE 7.5; 7.5; 7.5; 7.5 MG/1; MG/1; MG/1; MG/1
30 CAPSULE, EXTENDED RELEASE ORAL EVERY MORNING
Qty: 30 CAPSULE | Refills: 0 | Status: SHIPPED | OUTPATIENT
Start: 2019-09-06 | End: 2019-10-11 | Stop reason: SDUPTHER

## 2019-09-24 RX ORDER — PRAVASTATIN SODIUM 40 MG
40 TABLET ORAL DAILY
Qty: 90 TABLET | Refills: 0 | Status: SHIPPED | OUTPATIENT
Start: 2019-09-24 | End: 2019-10-16 | Stop reason: SDUPTHER

## 2019-10-02 RX ORDER — TAMSULOSIN HYDROCHLORIDE 0.4 MG/1
CAPSULE ORAL
Qty: 90 CAPSULE | Refills: 0 | Status: SHIPPED | OUTPATIENT
Start: 2019-10-02 | End: 2020-04-09

## 2019-10-11 RX ORDER — DEXTROAMPHETAMINE SACCHARATE, AMPHETAMINE ASPARTATE MONOHYDRATE, DEXTROAMPHETAMINE SULFATE AND AMPHETAMINE SULFATE 7.5; 7.5; 7.5; 7.5 MG/1; MG/1; MG/1; MG/1
30 CAPSULE, EXTENDED RELEASE ORAL EVERY MORNING
Qty: 30 CAPSULE | Refills: 0 | Status: SHIPPED | OUTPATIENT
Start: 2019-10-11 | End: 2019-11-25 | Stop reason: SDUPTHER

## 2019-10-16 RX ORDER — PRAVASTATIN SODIUM 40 MG
40 TABLET ORAL DAILY
Qty: 90 TABLET | Refills: 0 | Status: SHIPPED | OUTPATIENT
Start: 2019-10-16 | End: 2020-11-17

## 2019-10-23 ENCOUNTER — TELEPHONE (OUTPATIENT)
Dept: FAMILY MEDICINE CLINIC | Facility: CLINIC | Age: 54
End: 2019-10-23

## 2019-10-23 NOTE — TELEPHONE ENCOUNTER
Patient has a follow up appt scheduled with you on Friday at 8:30am.  He is wondering if you can remove a few skin tags at that time.  Please advise.

## 2019-10-23 NOTE — TELEPHONE ENCOUNTER
Gave message to patient at 1:50pm and scheduled an appt with Dr Tomlinson on 12/06/2019 at 3pm for 30 minutes.

## 2019-10-25 ENCOUNTER — OFFICE VISIT (OUTPATIENT)
Dept: FAMILY MEDICINE CLINIC | Facility: CLINIC | Age: 54
End: 2019-10-25

## 2019-10-25 VITALS
RESPIRATION RATE: 16 BRPM | BODY MASS INDEX: 35.9 KG/M2 | HEART RATE: 101 BPM | WEIGHT: 256.4 LBS | OXYGEN SATURATION: 99 % | HEIGHT: 71 IN | DIASTOLIC BLOOD PRESSURE: 108 MMHG | TEMPERATURE: 98.2 F | SYSTOLIC BLOOD PRESSURE: 152 MMHG

## 2019-10-25 DIAGNOSIS — F32.A DEPRESSION, UNSPECIFIED DEPRESSION TYPE: Primary | ICD-10-CM

## 2019-10-25 DIAGNOSIS — E78.5 HYPERLIPIDEMIA, UNSPECIFIED HYPERLIPIDEMIA TYPE: ICD-10-CM

## 2019-10-25 DIAGNOSIS — R53.82 CHRONIC FATIGUE: ICD-10-CM

## 2019-10-25 DIAGNOSIS — I10 ESSENTIAL HYPERTENSION: ICD-10-CM

## 2019-10-25 PROCEDURE — 99213 OFFICE O/P EST LOW 20 MIN: CPT | Performed by: INTERNAL MEDICINE

## 2019-10-25 RX ORDER — DEXTROAMPHETAMINE SACCHARATE, AMPHETAMINE ASPARTATE, DEXTROAMPHETAMINE SULFATE AND AMPHETAMINE SULFATE 2.5; 2.5; 2.5; 2.5 MG/1; MG/1; MG/1; MG/1
10 TABLET ORAL DAILY
Qty: 30 TABLET | Refills: 0 | Status: SHIPPED | OUTPATIENT
Start: 2019-10-25 | End: 2019-11-25 | Stop reason: SDUPTHER

## 2019-10-25 NOTE — PROGRESS NOTES
Subjective   Choco Bryant is a 54 y.o. male.     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    Review of Systems   Constitutional: Positive for fatigue. Negative for fever.   HENT: Negative for congestion, ear pain, rhinorrhea and sore throat.    Eyes: Negative for blurred vision and itching.   Respiratory: Negative for cough and shortness of breath.    Cardiovascular: Negative for chest pain and palpitations.   Gastrointestinal: Negative for abdominal pain, diarrhea and vomiting.   Endocrine: Negative for polydipsia and polyuria.   Genitourinary: Negative for dysuria, frequency, hematuria and urgency.   Musculoskeletal: Negative for joint swelling and myalgias.   Skin: Negative for rash and skin lesions.   Neurological: Positive for memory problem. Negative for dizziness, numbness and headache.   Psychiatric/Behavioral: Positive for dysphoric mood and stress. Negative for depressed mood. The patient is not nervous/anxious.          Current Outpatient Medications:   •  albuterol sulfate HFA (PROAIR HFA) 108 (90 Base) MCG/ACT inhaler, INL 2 PFS PO Q 4 H FOR UP TO 3 DAYS PRF WHZ OR SOB, Disp: , Rfl:   •  amphetamine-dextroamphetamine (ADDERALL) 10 MG tablet, Take 1 tablet by mouth Daily., Disp: 30 tablet, Rfl: 0  •  amphetamine-dextroamphetamine XR (ADDERALL  "XR) 30 MG 24 hr capsule, Take 1 capsule by mouth Every Morning, Disp: 30 capsule, Rfl: 0  •  Cariprazine HCl (VRAYLAR) 1.5 MG capsule capsule, Take 1 capsule by mouth Daily., Disp: 90 capsule, Rfl: 3  •  clonazePAM (KlonoPIN) 2 MG tablet, Take 1 tablet by mouth At Night As Needed for Anxiety (insomnia)., Disp: 30 tablet, Rfl: 1  •  FLONASE ALLERGY RELIEF 50 MCG/ACT nasal spray, USE 2 SPRAYS IN EACH NOSTRIL QD, Disp: , Rfl: 2  •  gabapentin (NEURONTIN) 100 MG capsule, TAKE 1 CAPSULE BY MOUTH THREE TIMES DAILY, Disp: 270 capsule, Rfl: 0  •  losartan (COZAAR) 100 MG tablet, Daily., Disp: , Rfl:   •  LOTEMAX 0.5 % gel ophthalmic gel, APPLY 1 DROP IN THE EYE 2 TIMES EVERY DAY, Disp: , Rfl: 3  •  Omeprazole ([employer]S OMEPRAZOLE) 20 MG tablet delayed-release, CVS OMEPRAZOLE 20 MG TBEC, Disp: , Rfl:   •  pravastatin (PRAVACHOL) 40 MG tablet, TAKE 1 TABLET BY MOUTH DAILY, Disp: 90 tablet, Rfl: 0  •  tamsulosin (FLOMAX) 0.4 MG capsule 24 hr capsule, TAKE 1 CAPSULE BY MOUTH EVERY DAY, Disp: 90 capsule, Rfl: 0  •  Vortioxetine HBr (TRINTELLIX) 20 MG tablet, Take 20 mg by mouth Daily., Disp: 90 tablet, Rfl: 1    Objective   BP (!) 152/108 (BP Location: Right arm, Patient Position: Sitting, Cuff Size: Large Adult)   Pulse 101   Temp 98.2 °F (36.8 °C) (Oral)   Resp 16   Ht 179.1 cm (70.5\")   Wt 116 kg (256 lb 6.4 oz)   SpO2 99%   BMI 36.27 kg/m²   Physical Exam   Constitutional: He is oriented to person, place, and time. He appears well-developed and well-nourished. No distress.   HENT:   Head: Normocephalic and atraumatic.   Mouth/Throat: Oropharynx is clear and moist. No oropharyngeal exudate.   Eyes: Conjunctivae and EOM are normal. Right eye exhibits no discharge. Left eye exhibits no discharge. No scleral icterus.   Neck: Normal range of motion. Neck supple. No thyromegaly present.   Cardiovascular: Normal rate, regular rhythm and normal heart sounds. Exam reveals no gallop and no friction rub.   No murmur " heard.  Pulmonary/Chest: Effort normal and breath sounds normal. No respiratory distress. He has no wheezes. He has no rales.   Musculoskeletal: Normal range of motion. He exhibits no edema or deformity.   Lymphadenopathy:     He has no cervical adenopathy.   Neurological: He is alert and oriented to person, place, and time. No cranial nerve deficit.   Skin: Skin is warm and dry. No rash noted. He is not diaphoretic. No erythema.   Psychiatric: He has a normal mood and affect. His behavior is normal. Thought content normal.   Vitals reviewed.        Assessment/Plan   Problems Addressed this Visit        Cardiovascular and Mediastinum    Hypertension    Relevant Orders    Comprehensive metabolic panel    Lipid panel       Other    Depression - Primary    Relevant Medications    Vortioxetine HBr (TRINTELLIX) 10 MG tablet    amphetamine-dextroamphetamine (ADDERALL) 10 MG tablet    Fatigue    Relevant Orders    Vitamin B12      Other Visit Diagnoses     Hyperlipidemia, unspecified hyperlipidemia type        Relevant Orders    Comprehensive metabolic panel    Lipid panel        Repeat manual bp with sbp 120  Check labs prior to next visit  Continue vraylar  Decrease trintellix to 10mg  Refill adderall         Procedures

## 2019-11-13 ENCOUNTER — TELEPHONE (OUTPATIENT)
Dept: FAMILY MEDICINE CLINIC | Facility: CLINIC | Age: 54
End: 2019-11-13

## 2019-11-13 NOTE — TELEPHONE ENCOUNTER
PATIENT NEEDS PRESCRIPTION FOR VRAYLAR 1.5MG TAKE ONE IN THE AFTERNOON. PLEASE SEND TO PHARMACY. I HAVE GIVEN A FEW SAMPLES OF THIS AS WELL TO HOLD PATIENT OVER.

## 2019-11-21 RX ORDER — CLONAZEPAM 2 MG/1
TABLET ORAL
Qty: 30 TABLET | Refills: 1 | Status: SHIPPED | OUTPATIENT
Start: 2019-11-21 | End: 2020-02-27

## 2019-11-25 RX ORDER — DEXTROAMPHETAMINE SACCHARATE, AMPHETAMINE ASPARTATE, DEXTROAMPHETAMINE SULFATE AND AMPHETAMINE SULFATE 2.5; 2.5; 2.5; 2.5 MG/1; MG/1; MG/1; MG/1
10 TABLET ORAL DAILY
Qty: 30 TABLET | Refills: 0 | Status: SHIPPED | OUTPATIENT
Start: 2019-11-25 | End: 2020-01-10 | Stop reason: SDUPTHER

## 2019-11-25 RX ORDER — DEXTROAMPHETAMINE SACCHARATE, AMPHETAMINE ASPARTATE MONOHYDRATE, DEXTROAMPHETAMINE SULFATE AND AMPHETAMINE SULFATE 7.5; 7.5; 7.5; 7.5 MG/1; MG/1; MG/1; MG/1
30 CAPSULE, EXTENDED RELEASE ORAL EVERY MORNING
Qty: 30 CAPSULE | Refills: 0 | Status: SHIPPED | OUTPATIENT
Start: 2019-11-25 | End: 2020-01-21 | Stop reason: SDUPTHER

## 2019-12-06 ENCOUNTER — PROCEDURE VISIT (OUTPATIENT)
Dept: FAMILY MEDICINE CLINIC | Facility: CLINIC | Age: 54
End: 2019-12-06

## 2019-12-06 VITALS
OXYGEN SATURATION: 96 % | RESPIRATION RATE: 20 BRPM | HEIGHT: 71 IN | SYSTOLIC BLOOD PRESSURE: 136 MMHG | TEMPERATURE: 97.9 F | HEART RATE: 106 BPM | DIASTOLIC BLOOD PRESSURE: 76 MMHG | WEIGHT: 262.6 LBS | BODY MASS INDEX: 36.76 KG/M2

## 2019-12-06 DIAGNOSIS — E66.9 CLASS 2 OBESITY WITHOUT SERIOUS COMORBIDITY WITH BODY MASS INDEX (BMI) OF 37.0 TO 37.9 IN ADULT, UNSPECIFIED OBESITY TYPE: ICD-10-CM

## 2019-12-06 DIAGNOSIS — L91.8 SKIN TAG: Primary | ICD-10-CM

## 2019-12-06 DIAGNOSIS — K42.9 UMBILICAL HERNIA WITHOUT OBSTRUCTION AND WITHOUT GANGRENE: ICD-10-CM

## 2019-12-06 DIAGNOSIS — F39 MOOD DISORDER (HCC): ICD-10-CM

## 2019-12-06 PROCEDURE — 99214 OFFICE O/P EST MOD 30 MIN: CPT | Performed by: INTERNAL MEDICINE

## 2019-12-06 PROCEDURE — 11200 RMVL SKIN TAGS UP TO&INC 15: CPT | Performed by: INTERNAL MEDICINE

## 2019-12-06 NOTE — PROGRESS NOTES
Subjective   Choco Bryant is a 54 y.o. male.     Pt is here for mood d/o and skin tag removal  Doing well on vraylar - needs refill  Also has 4 skin tags that he would like removed  Irritating him, painful when rubbed against  Has 2 in left axilla, one on R shoulder, and one on R hip         The following portions of the patient's history were reviewed and updated as appropriate: allergies, current medications, past family history, past medical history, past social history, past surgical history and problem list.    Review of Systems   Constitutional: Negative for fatigue and fever.   HENT: Negative for congestion, ear pain, rhinorrhea and sore throat.    Eyes: Negative for blurred vision and itching.   Respiratory: Negative for cough and shortness of breath.    Cardiovascular: Negative for chest pain and palpitations.   Gastrointestinal: Negative for abdominal pain, diarrhea and vomiting.   Endocrine: Negative for polydipsia and polyuria.   Genitourinary: Negative for dysuria, frequency, hematuria and urgency.   Musculoskeletal: Negative for joint swelling and myalgias.   Skin: Positive for skin lesions. Negative for rash.   Neurological: Negative for dizziness, numbness and headache.   Psychiatric/Behavioral: Negative for depressed mood. The patient is not nervous/anxious.          Current Outpatient Medications:   •  albuterol sulfate HFA (PROAIR HFA) 108 (90 Base) MCG/ACT inhaler, INL 2 PFS PO Q 4 H FOR UP TO 3 DAYS PRF WHZ OR SOB, Disp: , Rfl:   •  amphetamine-dextroamphetamine (ADDERALL) 10 MG tablet, Take 1 tablet by mouth Daily., Disp: 30 tablet, Rfl: 0  •  amphetamine-dextroamphetamine XR (ADDERALL XR) 30 MG 24 hr capsule, Take 1 capsule by mouth Every Morning, Disp: 30 capsule, Rfl: 0  •  Cariprazine HCl (VRAYLAR) 1.5 MG capsule capsule, Take 1 capsule by mouth Daily., Disp: 90 capsule, Rfl: 3  •  clonazePAM (KlonoPIN) 2 MG tablet, TAKE 1 TABLET BY MOUTH AT NIGHT AS NEEDED FOR ANXIETY OR INSOMNIA, Disp:  "30 tablet, Rfl: 1  •  FLONASE ALLERGY RELIEF 50 MCG/ACT nasal spray, USE 2 SPRAYS IN EACH NOSTRIL QD, Disp: , Rfl: 2  •  gabapentin (NEURONTIN) 100 MG capsule, TAKE 1 CAPSULE BY MOUTH THREE TIMES DAILY, Disp: 270 capsule, Rfl: 0  •  losartan (COZAAR) 100 MG tablet, Daily., Disp: , Rfl:   •  LOTEMAX 0.5 % gel ophthalmic gel, APPLY 1 DROP IN THE EYE 2 TIMES EVERY DAY, Disp: , Rfl: 3  •  Omeprazole (CVS OMEPRAZOLE) 20 MG tablet delayed-release, CVS OMEPRAZOLE 20 MG TBEC, Disp: , Rfl:   •  pravastatin (PRAVACHOL) 40 MG tablet, TAKE 1 TABLET BY MOUTH DAILY, Disp: 90 tablet, Rfl: 0  •  tamsulosin (FLOMAX) 0.4 MG capsule 24 hr capsule, TAKE 1 CAPSULE BY MOUTH EVERY DAY, Disp: 90 capsule, Rfl: 0  •  Vortioxetine HBr (TRINTELLIX) 10 MG tablet, Take 10 mg by mouth Daily., Disp: 90 tablet, Rfl: 0    Objective   /76 (BP Location: Left arm, Patient Position: Sitting, Cuff Size: Adult)   Pulse 106   Temp 97.9 °F (36.6 °C) (Oral)   Resp 20   Ht 179.1 cm (70.5\")   Wt 119 kg (262 lb 9.6 oz)   SpO2 96%   BMI 37.15 kg/m²   Physical Exam   Constitutional: He is oriented to person, place, and time. He appears well-developed and well-nourished. No distress.   HENT:   Head: Normocephalic and atraumatic.   Mouth/Throat: Oropharynx is clear and moist. No oropharyngeal exudate.   Eyes: Conjunctivae and EOM are normal. Right eye exhibits no discharge. Left eye exhibits no discharge. No scleral icterus.   Neck: Normal range of motion. Neck supple. No thyromegaly present.   Cardiovascular: Normal rate, regular rhythm and normal heart sounds. Exam reveals no gallop and no friction rub.   No murmur heard.  Pulmonary/Chest: Effort normal and breath sounds normal. No respiratory distress. He has no wheezes. He has no rales.   Musculoskeletal: Normal range of motion. He exhibits no edema or deformity.   Lymphadenopathy:     He has no cervical adenopathy.   Neurological: He is alert and oriented to person, place, and time. No cranial " nerve deficit.   Skin: Skin is warm and dry. No rash noted. He is not diaphoretic. No erythema.   Psychiatric: He has a normal mood and affect. His behavior is normal. Thought content normal.   Vitals reviewed.        Assessment/Plan   Problems Addressed this Visit        Other    Umbilical hernia      Other Visit Diagnoses     Skin tag    -  Primary    Mood disorder (CMS/HCC)        Relevant Medications    Cariprazine HCl (VRAYLAR) 1.5 MG capsule capsule          Removed skin tags - cleaned with iodine, injected with anesthetic, removed with 11 blade, and cauterized with silver  bandaid on right shoulder, and compression dressing over L axilla, right hip  Also 3 steristrips on R hip  Refilled vraylar  Advised pt to try to work on weight loss prior to hernia repair - hernia seems a little larger to me today than previously  Pt is going to try to cut down on portion sizes and sweets first  F/u in 3 mo to monitor weight and mood       Skin Tag Removal  Date/Time: 12/6/2019 3:02 PM  Performed by: Lynnette Tomlinson MD  Authorized by: Lynnette Tomlinson MD   Preparation: Patient was prepped and draped in the usual sterile fashion.  Local anesthesia used: yes  Anesthesia: local infiltration    Anesthesia:  Local anesthesia used: yes  Local Anesthetic: lidocaine 1% with epinephrine  Anesthetic total: 1 mL    Sedation:  Patient sedated: no    Patient tolerance: Patient tolerated the procedure well with no immediate complications

## 2019-12-27 ENCOUNTER — TELEPHONE (OUTPATIENT)
Dept: FAMILY MEDICINE CLINIC | Facility: CLINIC | Age: 54
End: 2019-12-27

## 2020-01-07 ENCOUNTER — TELEPHONE (OUTPATIENT)
Dept: FAMILY MEDICINE CLINIC | Facility: CLINIC | Age: 55
End: 2020-01-07

## 2020-01-07 NOTE — TELEPHONE ENCOUNTER
You wanted patient to monitor his blood pressure for the last few days and let you know.  The lowest reading he got was 150/76 and the highest reading was 176/114.  He currently takes Losartan 100mg once daily.  Please advise what you want him to do so I can call him back.

## 2020-01-08 RX ORDER — AMLODIPINE BESYLATE 5 MG/1
5 TABLET ORAL DAILY
Qty: 30 TABLET | Refills: 1 | Status: SHIPPED | OUTPATIENT
Start: 2020-01-08 | End: 2020-01-08

## 2020-01-08 RX ORDER — AMLODIPINE BESYLATE 5 MG/1
5 TABLET ORAL DAILY
Qty: 90 TABLET | Refills: 1 | Status: SHIPPED | OUTPATIENT
Start: 2020-01-08 | End: 2020-01-21 | Stop reason: SDUPTHER

## 2020-01-10 DIAGNOSIS — F32.A DEPRESSION, UNSPECIFIED DEPRESSION TYPE: Primary | ICD-10-CM

## 2020-01-10 RX ORDER — DEXTROAMPHETAMINE SACCHARATE, AMPHETAMINE ASPARTATE, DEXTROAMPHETAMINE SULFATE AND AMPHETAMINE SULFATE 2.5; 2.5; 2.5; 2.5 MG/1; MG/1; MG/1; MG/1
10 TABLET ORAL DAILY
Qty: 30 TABLET | Refills: 0 | Status: SHIPPED | OUTPATIENT
Start: 2020-01-10 | End: 2020-11-09

## 2020-01-21 ENCOUNTER — TELEPHONE (OUTPATIENT)
Dept: FAMILY MEDICINE CLINIC | Facility: CLINIC | Age: 55
End: 2020-01-21

## 2020-01-21 DIAGNOSIS — F98.8 ATTENTION DEFICIT DISORDER (ADD) WITHOUT HYPERACTIVITY: Primary | ICD-10-CM

## 2020-01-21 RX ORDER — DEXTROAMPHETAMINE SACCHARATE, AMPHETAMINE ASPARTATE MONOHYDRATE, DEXTROAMPHETAMINE SULFATE AND AMPHETAMINE SULFATE 7.5; 7.5; 7.5; 7.5 MG/1; MG/1; MG/1; MG/1
30 CAPSULE, EXTENDED RELEASE ORAL EVERY MORNING
Qty: 30 CAPSULE | Refills: 0 | Status: SHIPPED | OUTPATIENT
Start: 2020-01-21 | End: 2020-03-03 | Stop reason: SDUPTHER

## 2020-01-21 RX ORDER — AMLODIPINE BESYLATE 10 MG/1
5 TABLET ORAL DAILY
Qty: 90 TABLET | Refills: 0 | Status: SHIPPED | OUTPATIENT
Start: 2020-01-21 | End: 2020-07-13

## 2020-01-21 NOTE — TELEPHONE ENCOUNTER
PATIENT SAID THAT HIS ADDERALL 10MG WAS SENT TO THE PHARMACY BUT HE ALSO NEEDED HIS ADDERAL XR 30 MG SENT TO [facility]. PATIENT ALSO WANTED YOU TO KNOW THAT HIS B/P READINGS HAVE BEEN 153/101 AND A 156/98. PATIENT WOULD LIKE TO KNOW IF YOU ARE GOOD WITH THESE READINGS.

## 2020-02-26 DIAGNOSIS — G47.00 INSOMNIA, UNSPECIFIED TYPE: Primary | ICD-10-CM

## 2020-02-27 RX ORDER — CLONAZEPAM 2 MG/1
TABLET ORAL
Qty: 30 TABLET | Refills: 2 | Status: SHIPPED | OUTPATIENT
Start: 2020-02-27 | End: 2020-04-27 | Stop reason: SDUPTHER

## 2020-03-03 ENCOUNTER — TELEPHONE (OUTPATIENT)
Dept: FAMILY MEDICINE CLINIC | Facility: CLINIC | Age: 55
End: 2020-03-03

## 2020-03-03 DIAGNOSIS — F98.8 ATTENTION DEFICIT DISORDER (ADD) WITHOUT HYPERACTIVITY: ICD-10-CM

## 2020-03-03 RX ORDER — DEXTROAMPHETAMINE SACCHARATE, AMPHETAMINE ASPARTATE MONOHYDRATE, DEXTROAMPHETAMINE SULFATE AND AMPHETAMINE SULFATE 7.5; 7.5; 7.5; 7.5 MG/1; MG/1; MG/1; MG/1
30 CAPSULE, EXTENDED RELEASE ORAL EVERY MORNING
Qty: 30 CAPSULE | Refills: 0 | Status: SHIPPED | OUTPATIENT
Start: 2020-03-03 | End: 2020-04-27 | Stop reason: SDUPTHER

## 2020-03-03 NOTE — TELEPHONE ENCOUNTER
PATIENT CALLED TO REFILL amphetamine-dextroamphetamine XR (ADDERALL XR) 30 MG 24 hr capsule    PATIENT CONTACT NUMBER [phone]    PHARMACY VERIFIED

## 2020-04-09 RX ORDER — LOSARTAN POTASSIUM 100 MG/1
TABLET ORAL
Qty: 90 TABLET | Refills: 1 | Status: SHIPPED | OUTPATIENT
Start: 2020-04-09 | End: 2020-10-15

## 2020-04-09 RX ORDER — TAMSULOSIN HYDROCHLORIDE 0.4 MG/1
CAPSULE ORAL
Qty: 90 CAPSULE | Refills: 0 | Status: SHIPPED | OUTPATIENT
Start: 2020-04-09 | End: 2020-10-15

## 2020-04-13 RX ORDER — VORTIOXETINE 20 MG/1
TABLET, FILM COATED ORAL
Qty: 90 TABLET | Refills: 1 | Status: SHIPPED | OUTPATIENT
Start: 2020-04-13 | End: 2020-11-09

## 2020-04-27 DIAGNOSIS — G47.00 INSOMNIA, UNSPECIFIED TYPE: ICD-10-CM

## 2020-04-27 DIAGNOSIS — F98.8 ATTENTION DEFICIT DISORDER (ADD) WITHOUT HYPERACTIVITY: ICD-10-CM

## 2020-04-27 RX ORDER — DEXTROAMPHETAMINE SACCHARATE, AMPHETAMINE ASPARTATE MONOHYDRATE, DEXTROAMPHETAMINE SULFATE AND AMPHETAMINE SULFATE 7.5; 7.5; 7.5; 7.5 MG/1; MG/1; MG/1; MG/1
30 CAPSULE, EXTENDED RELEASE ORAL EVERY MORNING
Qty: 30 CAPSULE | Refills: 0 | Status: SHIPPED | OUTPATIENT
Start: 2020-04-27 | End: 2020-05-19 | Stop reason: SDUPTHER

## 2020-04-27 RX ORDER — CLONAZEPAM 2 MG/1
2 TABLET ORAL NIGHTLY PRN
Qty: 30 TABLET | Refills: 2 | Status: SHIPPED | OUTPATIENT
Start: 2020-04-27 | End: 2020-10-08 | Stop reason: SDUPTHER

## 2020-05-14 ENCOUNTER — LAB REQUISITION (OUTPATIENT)
Dept: LAB | Facility: HOSPITAL | Age: 55
End: 2020-05-14

## 2020-05-14 DIAGNOSIS — Z00.00 ENCOUNTER FOR GENERAL ADULT MEDICAL EXAMINATION WITHOUT ABNORMAL FINDINGS: ICD-10-CM

## 2020-05-14 PROCEDURE — U0003 INFECTIOUS AGENT DETECTION BY NUCLEIC ACID (DNA OR RNA); SEVERE ACUTE RESPIRATORY SYNDROME CORONAVIRUS 2 (SARS-COV-2) (CORONAVIRUS DISEASE [COVID-19]), AMPLIFIED PROBE TECHNIQUE, MAKING USE OF HIGH THROUGHPUT TECHNOLOGIES AS DESCRIBED BY CMS-2020-01-R: HCPCS | Performed by: EMERGENCY MEDICINE

## 2020-05-15 LAB — SARS-COV-2 RNA RESP QL NAA+PROBE: NOT DETECTED

## 2020-05-19 DIAGNOSIS — F98.8 ATTENTION DEFICIT DISORDER (ADD) WITHOUT HYPERACTIVITY: ICD-10-CM

## 2020-05-19 RX ORDER — DEXTROAMPHETAMINE SACCHARATE, AMPHETAMINE ASPARTATE MONOHYDRATE, DEXTROAMPHETAMINE SULFATE AND AMPHETAMINE SULFATE 7.5; 7.5; 7.5; 7.5 MG/1; MG/1; MG/1; MG/1
30 CAPSULE, EXTENDED RELEASE ORAL EVERY MORNING
Qty: 30 CAPSULE | Refills: 0 | Status: SHIPPED | OUTPATIENT
Start: 2020-05-19 | End: 2020-07-04 | Stop reason: SDUPTHER

## 2020-06-02 ENCOUNTER — E-VISIT (OUTPATIENT)
Dept: FAMILY MEDICINE CLINIC | Facility: CLINIC | Age: 55
End: 2020-06-02

## 2020-06-02 DIAGNOSIS — J01.90 ACUTE NON-RECURRENT SINUSITIS, UNSPECIFIED LOCATION: Primary | ICD-10-CM

## 2020-06-02 PROCEDURE — 99421 OL DIG E/M SVC 5-10 MIN: CPT | Performed by: INTERNAL MEDICINE

## 2020-06-02 RX ORDER — CEFUROXIME AXETIL 500 MG/1
500 TABLET ORAL 2 TIMES DAILY
Qty: 20 TABLET | Refills: 0 | Status: SHIPPED | OUTPATIENT
Start: 2020-06-02 | End: 2020-11-09

## 2020-06-02 NOTE — PROGRESS NOTES
Subjective   Choco Bryant is a 55 y.o. male.     History of Present Illness     Pt is having trouble with sinus issues, starting a few days ago. Already on allergy medications. No fever, but c/o sore throat, nasal congestion, and green colored discharge. He has +covid exposure - denies fever, loss of smell or taste. Pt does not note abd pain, diarrhea, cough, ear pain.    The following portions of the patient's history were reviewed and updated as appropriate: allergies, current medications, past family history, past medical history, past social history, past surgical history and problem list.    Review of Systems   See hpi      Current Outpatient Medications:   •  albuterol sulfate HFA (PROAIR HFA) 108 (90 Base) MCG/ACT inhaler, INL 2 PFS PO Q 4 H FOR UP TO 3 DAYS PRF WHZ OR SOB, Disp: , Rfl:   •  amLODIPine (NORVASC) 10 MG tablet, Take 0.5 tablets by mouth Daily., Disp: 90 tablet, Rfl: 0  •  amphetamine-dextroamphetamine (ADDERALL) 10 MG tablet, Take 1 tablet by mouth Daily., Disp: 30 tablet, Rfl: 0  •  amphetamine-dextroamphetamine XR (Adderall XR) 30 MG 24 hr capsule, Take 1 capsule by mouth Every Morning, Disp: 30 capsule, Rfl: 0  •  Cariprazine HCl (VRAYLAR) 1.5 MG capsule capsule, Take 1 capsule by mouth Daily., Disp: 90 capsule, Rfl: 3  •  clonazePAM (KlonoPIN) 2 MG tablet, Take 1 tablet by mouth At Night As Needed for Anxiety., Disp: 30 tablet, Rfl: 2  •  FLONASE ALLERGY RELIEF 50 MCG/ACT nasal spray, USE 2 SPRAYS IN EACH NOSTRIL QD, Disp: , Rfl: 2  •  gabapentin (NEURONTIN) 100 MG capsule, TAKE 1 CAPSULE BY MOUTH THREE TIMES DAILY, Disp: 270 capsule, Rfl: 0  •  losartan (COZAAR) 100 MG tablet, TAKE 1 TABLET BY MOUTH EVERY DAY., Disp: 90 tablet, Rfl: 1  •  LOTEMAX 0.5 % gel ophthalmic gel, APPLY 1 DROP IN THE EYE 2 TIMES EVERY DAY, Disp: , Rfl: 3  •  Omeprazole (CVS OMEPRAZOLE) 20 MG tablet delayed-release, CVS OMEPRAZOLE 20 MG TBEC, Disp: , Rfl:   •  pravastatin (PRAVACHOL) 40 MG tablet, TAKE 1 TABLET BY  MOUTH DAILY, Disp: 90 tablet, Rfl: 0  •  tamsulosin (FLOMAX) 0.4 MG capsule 24 hr capsule, TAKE 1 CAPSULE BY MOUTH EVERY DAY, Disp: 90 capsule, Rfl: 0  •  TRINTELLIX 20 MG tablet, TAKE 1 TABLET BY MOUTH DAILY, Disp: 90 tablet, Rfl: 1  •  Vortioxetine HBr (TRINTELLIX) 10 MG tablet, Take 10 mg by mouth Daily., Disp: 90 tablet, Rfl: 0    Objective   There were no vitals taken for this visit.  Physical Exam  n/a    Assessment/Plan   Problems Addressed this Visit     None      Visit Diagnoses     Acute non-recurrent sinusitis, unspecified location    -  Primary        Will send in ceftin  Cont allergy meds         Procedures

## 2020-06-29 ENCOUNTER — PATIENT MESSAGE (OUTPATIENT)
Dept: FAMILY MEDICINE CLINIC | Facility: CLINIC | Age: 55
End: 2020-06-29

## 2020-06-30 RX ORDER — SILDENAFIL CITRATE 20 MG/1
20-100 TABLET ORAL DAILY PRN
Qty: 50 TABLET | Refills: 3 | Status: SHIPPED | OUTPATIENT
Start: 2020-06-30 | End: 2021-09-10 | Stop reason: SDUPTHER

## 2020-06-30 NOTE — TELEPHONE ENCOUNTER
"Xiomy Holman, PAIGE 6/29/2020 4:02 PM EDT        ----- Message -----  From: Choco Bryant  Sent: 6/29/2020 3:23 PM EDT  [facility]  Subject: RE: Non-Urgent Medical Question     Yes please, that would be great  ----- Message -----  From: Lynnette Tomlinson MD  Sent: 6/29/2020 1:53 PM EDT  To: Choco Bryant  Subject: RE: Non-Urgent Medical Question  Hi,    Glad you're working on your weight, and I'm always happy to see you!  We can certainly try Sildenafil. None of your meds will interact with it, and your blood pressure should be able to handle it.  Would you like me to send in the prescription to [facility] [address]?    Thanks,  Dr. Tomlinson      ----- Message -----   From: Choco Bryant   Sent: 6/29/2020 11:35 AM EDT   To: Lynnette Tomlinson MD  Subject: Non-Urgent Medical Question    Dr. Tomlinson,    Thank you for all that you have done for me.    Now on to my question, I hit a high weight of 252 lbs (ugh), I am on a diet and am down 13 lbs and am shooting for a target weight of 200 lbs. that being said I am still having a problem with our intimacy. Nuvia isn't always in the mood and I accept that, but when she is I would like to be able to \"participate\" lol     Based on my blood pressure issues and weight, do you thing that a little blue pill or similar would be an effective treatment for the issue? I understand if you would want an in office visit, but I will be out of town for most of July to finally go and take my [employer] classes,( I haven't been this excited or happy in a very long time)    "

## 2020-07-02 ENCOUNTER — E-VISIT (OUTPATIENT)
Dept: FAMILY MEDICINE CLINIC | Facility: CLINIC | Age: 55
End: 2020-07-02

## 2020-07-02 DIAGNOSIS — N52.9 ERECTILE DYSFUNCTION, UNSPECIFIED ERECTILE DYSFUNCTION TYPE: Primary | ICD-10-CM

## 2020-07-02 PROCEDURE — 99422 OL DIG E/M SVC 11-20 MIN: CPT | Performed by: INTERNAL MEDICINE

## 2020-07-02 NOTE — PROGRESS NOTES
"----- Message -----  From: Choco Bryant  Sent: 6/29/2020   3:23 PM EDT  To: [facility]ter [facility] Pool  Subject: RE: Non-Urgent Medical Question                  Yes please, that would be great  ----- Message -----  From: Lynnette Tomlinson MD  Sent: 6/29/2020  1:53 PM EDT  To: Choco Bryant  Subject: RE: Non-Urgent Medical Question  Hi,    Glad you're working on your weight, and I'm always happy to see you!  We can certainly try Sildenafil. None of your meds will interact with it, and your blood pressure should be able to handle it.  Would you like me to send in the prescription to [facility] [place]?    Thanks,  Dr. Tomlinson      ----- Message -----    From: Choco Bryant    Sent: 6/29/2020 11:35 AM EDT      To: Lynnette Tomlinson MD  Subject: Non-Urgent Medical Question    Dr. Tomlinson,    Thank you for all that you have done for me.    Now on to my question, I hit a high weight of 252 lbs (ugh), I am on a diet and am down 13 lbs and am shooting for a target weight of 200 lbs. that being said I am still having a problem with our intimacy. Nuvia isn't always in the mood and I accept that, but when she is I would like to be able to \"participate\" lol     Based on my blood pressure issues and weight, do you thing that a little blue pill or similar would be an effective treatment for the issue?  I understand if you would want an in office visit, but I will be out of town for most of July to finally go and take my [employer] classes,( I haven't been this excited or happy in a very long time)          A/P:  Erectile dysfunction - try sildenafil  "

## 2020-07-04 DIAGNOSIS — F98.8 ATTENTION DEFICIT DISORDER (ADD) WITHOUT HYPERACTIVITY: ICD-10-CM

## 2020-07-07 RX ORDER — DEXTROAMPHETAMINE SACCHARATE, AMPHETAMINE ASPARTATE MONOHYDRATE, DEXTROAMPHETAMINE SULFATE AND AMPHETAMINE SULFATE 7.5; 7.5; 7.5; 7.5 MG/1; MG/1; MG/1; MG/1
30 CAPSULE, EXTENDED RELEASE ORAL EVERY MORNING
Qty: 30 CAPSULE | Refills: 0 | Status: SHIPPED | OUTPATIENT
Start: 2020-07-07 | End: 2020-09-04 | Stop reason: SDUPTHER

## 2020-07-13 RX ORDER — AMLODIPINE BESYLATE 10 MG/1
TABLET ORAL
Qty: 90 TABLET | Refills: 0 | Status: SHIPPED | OUTPATIENT
Start: 2020-07-13 | End: 2020-10-30

## 2020-09-04 DIAGNOSIS — F98.8 ATTENTION DEFICIT DISORDER (ADD) WITHOUT HYPERACTIVITY: ICD-10-CM

## 2020-09-04 RX ORDER — DEXTROAMPHETAMINE SACCHARATE, AMPHETAMINE ASPARTATE MONOHYDRATE, DEXTROAMPHETAMINE SULFATE AND AMPHETAMINE SULFATE 7.5; 7.5; 7.5; 7.5 MG/1; MG/1; MG/1; MG/1
30 CAPSULE, EXTENDED RELEASE ORAL EVERY MORNING
Qty: 30 CAPSULE | Refills: 0 | Status: SHIPPED | OUTPATIENT
Start: 2020-09-04 | End: 2020-10-26 | Stop reason: SDUPTHER

## 2020-09-04 NOTE — TELEPHONE ENCOUNTER
LEFT VOICEMAIL FOR PT TO CALL BACK AND SCHEDULE HIS PHYSICAL. HUB PLEASE BEBETO PT NEXT AVAILABLE PHYSICAL.

## 2020-09-15 ENCOUNTER — TELEPHONE (OUTPATIENT)
Dept: FAMILY MEDICINE CLINIC | Facility: CLINIC | Age: 55
End: 2020-09-15

## 2020-09-15 NOTE — TELEPHONE ENCOUNTER
Please contact pt. Would recommend he schedule appt with one of the other providers here rather than [facility] or er

## 2020-09-15 NOTE — TELEPHONE ENCOUNTER
Left detailed message on patient's voice mail at 9:08am to call our office back to schedule an appt with one of our other providers.

## 2020-09-15 NOTE — TELEPHONE ENCOUNTER
----- Message from Choco Bryant sent at 9/14/2020  2:53 PM EDT -----  Regarding: Non-Urgent Medical Question  Contact: [phone]  Dr. Tomlinson,    Sorry to keep bothering you.  On Wed Sept 9, I woke up dizzy. I tried to take a shower and I passed out and fell out of the shower and hit my back and head on the bathroom tile floor. I stayed home and felt better the next day and was able to work the next day.    Today, just following lunch I started to feel dizzy again. sitting and especially when I stand. I'm not sure if this is a covid symptom, I have no others) or if this could possibly be a re-occurance of the ear canal crystals I had before.     I thought about an appointment, but the earliest you have is October the 8th.     What is your suggestion for me?  wait till Oct 8, an urgent care visit or possibly the ER?    Thank you for your help.

## 2020-09-29 ENCOUNTER — TELEPHONE (OUTPATIENT)
Dept: FAMILY MEDICINE CLINIC | Facility: CLINIC | Age: 55
End: 2020-09-29

## 2020-09-29 NOTE — TELEPHONE ENCOUNTER
Patient called in and stated his wife tested on Friday and got the results today 9/29/2020 . Patient was supposed to have a appointment on 10 /1/2020 and stated he is having some dizziness and has direct exposure to a positive . Please call patient and advise what he should do at [phone]

## 2020-09-29 NOTE — TELEPHONE ENCOUNTER
i'm sorry - could you clarify? Did his wife test positive?  If so, and i'm assuming he's been in close contact without a mask, then he should quarantine for 14 days.  Can change his visit to either telephone or video visit

## 2020-09-30 NOTE — TELEPHONE ENCOUNTER
Spoke with patient and he said his wife did test positive.  So I told patient to quarantine for 14 days.  He wanted me to cancel his appt for tomorrow and he said he would go online and schedule an appt after his quarantine.  He did not want to do a telephone or video visit.

## 2020-10-08 DIAGNOSIS — G47.00 INSOMNIA, UNSPECIFIED TYPE: ICD-10-CM

## 2020-10-09 RX ORDER — CLONAZEPAM 2 MG/1
2 TABLET ORAL NIGHTLY PRN
Qty: 30 TABLET | Refills: 2 | Status: SHIPPED | OUTPATIENT
Start: 2020-10-09 | End: 2021-01-09

## 2020-10-15 RX ORDER — LOSARTAN POTASSIUM 100 MG/1
TABLET ORAL
Qty: 30 TABLET | Refills: 0 | Status: SHIPPED | OUTPATIENT
Start: 2020-10-15 | End: 2020-11-09 | Stop reason: SDUPTHER

## 2020-10-15 RX ORDER — TAMSULOSIN HYDROCHLORIDE 0.4 MG/1
CAPSULE ORAL
Qty: 30 CAPSULE | Refills: 0 | Status: SHIPPED | OUTPATIENT
Start: 2020-10-15 | End: 2020-11-09 | Stop reason: SDUPTHER

## 2020-10-23 ENCOUNTER — TELEPHONE (OUTPATIENT)
Dept: FAMILY MEDICINE CLINIC | Facility: CLINIC | Age: 55
End: 2020-10-23

## 2020-10-23 NOTE — TELEPHONE ENCOUNTER
Received the following [employer] message:    Preferred Date Range: 10/21/2020 - 11/30/2020     Preferred Times: Any Time     Reason for visit: Annual Physical     Comments:  General check over, fasting blood work, prescription review. anything else you can think might be necessary for a 55 year old male.       Is there a time you can see him?

## 2020-10-25 NOTE — TELEPHONE ENCOUNTER
If he's due for a physical, fine to schedule in any 30 min slot available except hospital follow up

## 2020-10-26 DIAGNOSIS — F98.8 ATTENTION DEFICIT DISORDER (ADD) WITHOUT HYPERACTIVITY: ICD-10-CM

## 2020-10-26 RX ORDER — DEXTROAMPHETAMINE SACCHARATE, AMPHETAMINE ASPARTATE MONOHYDRATE, DEXTROAMPHETAMINE SULFATE AND AMPHETAMINE SULFATE 7.5; 7.5; 7.5; 7.5 MG/1; MG/1; MG/1; MG/1
30 CAPSULE, EXTENDED RELEASE ORAL EVERY MORNING
Qty: 30 CAPSULE | Refills: 0 | Status: SHIPPED | OUTPATIENT
Start: 2020-10-26 | End: 2020-11-09 | Stop reason: SDUPTHER

## 2020-10-29 ENCOUNTER — OFFICE VISIT (OUTPATIENT)
Dept: SURGERY | Facility: CLINIC | Age: 55
End: 2020-10-29

## 2020-10-29 VITALS
DIASTOLIC BLOOD PRESSURE: 87 MMHG | SYSTOLIC BLOOD PRESSURE: 122 MMHG | OXYGEN SATURATION: 100 % | BODY MASS INDEX: 30.44 KG/M2 | WEIGHT: 217.4 LBS | TEMPERATURE: 97.8 F | HEIGHT: 71 IN | RESPIRATION RATE: 18 BRPM | HEART RATE: 86 BPM

## 2020-10-29 DIAGNOSIS — K42.9 UMBILICAL HERNIA WITHOUT OBSTRUCTION AND WITHOUT GANGRENE: Primary | ICD-10-CM

## 2020-10-29 PROCEDURE — 99214 OFFICE O/P EST MOD 30 MIN: CPT | Performed by: SURGERY

## 2020-10-29 RX ORDER — SODIUM CHLORIDE 9 MG/ML
100 INJECTION, SOLUTION INTRAVENOUS CONTINUOUS
Status: CANCELLED | OUTPATIENT
Start: 2020-10-29

## 2020-10-29 NOTE — PROGRESS NOTES
"Subjective   Choco Bryant is a 55 y.o. male.   Chief Complaint   Patient presents with   • Follow-up     Umb Hernia, not seen since 2018     /87 (BP Location: Left arm, Patient Position: Sitting, Cuff Size: Adult)   Pulse 86   Temp 97.8 °F (36.6 °C) (Temporal)   Resp 18   Ht 179.1 cm (70.5\")   Wt 98.6 kg (217 lb 6.4 oz)   SpO2 100%   BMI 30.75 kg/m²     HISTORY OF PRESENT ILLNESS:  55-year-old gentleman who I met a couple years ago with a chief complaint of an umbilical bulge.  After counseling about weight loss and asymptomatic umbilical hernia he decided to wait for his surgery for couple years.  He is back today to continue to discussion about whether or not to move forward with surgery.  He says that over the last couple years he is lost close to 40 pounds.  He thinks that as he has lost weight the hernia bulge has gotten a little bit smaller.  He only has minor discomfort whenever things bumped into her presses on the hernia he denies any skin changes.  He did have some eye surgery which was complicated by infection including MRSA.      Outpatient Encounter Medications as of 10/29/2020   Medication Sig Dispense Refill   • albuterol sulfate HFA (PROAIR HFA) 108 (90 Base) MCG/ACT inhaler INL 2 PFS PO Q 4 H FOR UP TO 3 DAYS PRF WHZ OR SOB     • amLODIPine (NORVASC) 10 MG tablet TAKE ONE-HALF TABLET BY MOUTH EVERY DAY 90 tablet 0   • amphetamine-dextroamphetamine (ADDERALL) 10 MG tablet Take 1 tablet by mouth Daily. 30 tablet 0   • amphetamine-dextroamphetamine XR (Adderall XR) 30 MG 24 hr capsule Take 1 capsule by mouth Every Morning 30 capsule 0   • Cariprazine HCl (VRAYLAR) 1.5 MG capsule capsule Take 1 capsule by mouth Daily. 90 capsule 3   • clonazePAM (KlonoPIN) 2 MG tablet Take 1 tablet by mouth At Night As Needed for Anxiety. 30 tablet 2   • losartan (COZAAR) 100 MG tablet TAKE 1 TABLET BY MOUTH EVERY DAY 30 tablet 0   • Omeprazole ([facility] OMEPRAZOLE) 20 MG tablet delayed-release [facility]S OMEPRAZOLE " 20 MG TBEC     • pravastatin (PRAVACHOL) 40 MG tablet TAKE 1 TABLET BY MOUTH DAILY 90 tablet 0   • sildenafil (REVATIO) 20 MG tablet Take 1-5 tablets by mouth Daily As Needed (intercourse). 50 tablet 3   • tamsulosin (FLOMAX) 0.4 MG capsule 24 hr capsule TAKE 1 CAPSULE BY MOUTH EVERY DAY 30 capsule 0   • Vortioxetine HBr (TRINTELLIX) 10 MG tablet Take 10 mg by mouth Daily. 90 tablet 0   • cefuroxime (Ceftin) 500 MG tablet Take 1 tablet by mouth 2 (Two) Times a Day. 20 tablet 0   • FLONASE ALLERGY RELIEF 50 MCG/ACT nasal spray USE 2 SPRAYS IN EACH NOSTRIL QD  2   • gabapentin (NEURONTIN) 100 MG capsule TAKE 1 CAPSULE BY MOUTH THREE TIMES DAILY 270 capsule 0   • LOTEMAX 0.5 % gel ophthalmic gel APPLY 1 DROP IN THE EYE 2 TIMES EVERY DAY  3   • TRINTELLIX 20 MG tablet TAKE 1 TABLET BY MOUTH DAILY 90 tablet 1     No facility-administered encounter medications on file as of 10/29/2020.          The following portions of the patient's history were reviewed and updated as appropriate: allergies, current medications, past family history, past medical history, past social history, past surgical history and problem list.    Review of Systems  He denies any fevers or chills chest pain short of breath nausea vomiting worsening abdominal pain changes in bowel function  Objective   Physical Exam  Constitutional:       Appearance: Normal appearance. He is well-developed.   HENT:      Head: Normocephalic and atraumatic.   Eyes:      General: No scleral icterus.     Conjunctiva/sclera: Conjunctivae normal.   Neck:      Musculoskeletal: No muscular tenderness.      Trachea: No tracheal deviation.   Cardiovascular:      Rate and Rhythm: Normal rate.      Pulses: Normal pulses.   Pulmonary:      Effort: Pulmonary effort is normal. No respiratory distress.   Abdominal:      General: There is no distension.      Palpations: Abdomen is soft.      Comments: There is a approximately 5 to 10 mm umbilical hernia defect with a partially  reducible umbilical hernia likely containing preperitoneal fat   Musculoskeletal:         General: No swelling or tenderness.   Lymphadenopathy:      Cervical: No cervical adenopathy.   Skin:     General: Skin is warm and dry.   Neurological:      General: No focal deficit present.      Mental Status: He is alert. Mental status is at baseline.   Psychiatric:         Mood and Affect: Mood normal.         Behavior: Behavior normal.           Assessment/Plan   Diagnoses and all orders for this visit:    1. Umbilical hernia without obstruction and without gangrene (Primary)  -     Case Request; Standing  -     sodium chloride 0.9 % infusion  -     ceFAZolin (ANCEF) 2 g in sodium chloride 0.9 % 100 mL IVPB  -     Case Request    Other orders  -     Follow Anesthesia Guidelines / Standing Orders; Future  -     Provide NPO Instructions to Patient; Future  -     Chlorhexidine Skin Prep; Future  -     Follow Anesthesia Guidelines / Standing Orders; Standing  -     Verify NPO Status; Standing  -     Obtain Informed Consent; Standing  -     SCD (Sequential Compression Device) - Place on Patient in Pre-Op; Standing  -     Clip Operative Site; Standing  -     Have Patient Void Prior to Procedure; Standing  -     Instructions on Coughing, Deep Breathing & Incentive Spirometry; Standing  -     Notify Physician - Standard; Standing    I counseled him about the natural history of umbilical hernias the general risks and benefits to surgical repair.  We talked about performing this is an open operation primary repair alone if it is small but if greater than about 1 cm to place a hernia mesh.  We talked about the general risks of surgery including incisional scarring bruising bleeding risk of recurrence risk of surgical site infection risk of mesh infection.  We talked about the anticipated recovery including no lifting more than 10 to 15 pounds for about 4 weeks return to work in about 3 to 5 days.  After discussion he understands and  wished to proceed with surgery    Fredi Kaye MD  10/29/2020  4:45 PM EDT    This note was created using Dragon Voice Recognition software.

## 2020-10-30 PROBLEM — K42.9 UMBILICAL HERNIA WITHOUT OBSTRUCTION AND WITHOUT GANGRENE: Status: ACTIVE | Noted: 2020-10-30

## 2020-10-30 RX ORDER — AMLODIPINE BESYLATE 10 MG/1
TABLET ORAL
Qty: 90 TABLET | Refills: 0 | Status: SHIPPED | OUTPATIENT
Start: 2020-10-30 | End: 2020-11-09 | Stop reason: SDUPTHER

## 2020-11-09 ENCOUNTER — OFFICE VISIT (OUTPATIENT)
Dept: FAMILY MEDICINE CLINIC | Facility: CLINIC | Age: 55
End: 2020-11-09

## 2020-11-09 ENCOUNTER — RESULTS ENCOUNTER (OUTPATIENT)
Dept: FAMILY MEDICINE CLINIC | Facility: CLINIC | Age: 55
End: 2020-11-09

## 2020-11-09 VITALS
OXYGEN SATURATION: 98 % | BODY MASS INDEX: 30.07 KG/M2 | RESPIRATION RATE: 16 BRPM | SYSTOLIC BLOOD PRESSURE: 136 MMHG | HEIGHT: 71 IN | DIASTOLIC BLOOD PRESSURE: 90 MMHG | TEMPERATURE: 97.1 F | WEIGHT: 214.8 LBS | HEART RATE: 88 BPM

## 2020-11-09 DIAGNOSIS — Z12.5 SCREENING FOR MALIGNANT NEOPLASM OF PROSTATE: ICD-10-CM

## 2020-11-09 DIAGNOSIS — Z12.11 SCREENING FOR COLON CANCER: ICD-10-CM

## 2020-11-09 DIAGNOSIS — F98.8 ATTENTION DEFICIT DISORDER (ADD) WITHOUT HYPERACTIVITY: ICD-10-CM

## 2020-11-09 DIAGNOSIS — G47.00 INSOMNIA, UNSPECIFIED TYPE: ICD-10-CM

## 2020-11-09 DIAGNOSIS — F41.9 ANXIETY: ICD-10-CM

## 2020-11-09 DIAGNOSIS — Z00.00 PREVENTATIVE HEALTH CARE: Primary | ICD-10-CM

## 2020-11-09 DIAGNOSIS — F33.42 RECURRENT MAJOR DEPRESSIVE DISORDER, IN FULL REMISSION (HCC): ICD-10-CM

## 2020-11-09 PROCEDURE — 99396 PREV VISIT EST AGE 40-64: CPT | Performed by: INTERNAL MEDICINE

## 2020-11-09 RX ORDER — AMLODIPINE BESYLATE 10 MG/1
10 TABLET ORAL DAILY
Qty: 90 TABLET | Refills: 1 | Status: SHIPPED | OUTPATIENT
Start: 2020-11-09 | End: 2021-04-28 | Stop reason: SDUPTHER

## 2020-11-09 RX ORDER — LOSARTAN POTASSIUM 100 MG/1
100 TABLET ORAL DAILY
Qty: 90 TABLET | Refills: 1 | Status: SHIPPED | OUTPATIENT
Start: 2020-11-09 | End: 2021-03-04

## 2020-11-09 RX ORDER — TAMSULOSIN HYDROCHLORIDE 0.4 MG/1
1 CAPSULE ORAL DAILY
Qty: 90 CAPSULE | Refills: 1 | Status: SHIPPED | OUTPATIENT
Start: 2020-11-09 | End: 2021-09-10 | Stop reason: SDUPTHER

## 2020-11-09 RX ORDER — DEXTROAMPHETAMINE SACCHARATE, AMPHETAMINE ASPARTATE MONOHYDRATE, DEXTROAMPHETAMINE SULFATE AND AMPHETAMINE SULFATE 7.5; 7.5; 7.5; 7.5 MG/1; MG/1; MG/1; MG/1
30 CAPSULE, EXTENDED RELEASE ORAL EVERY MORNING
Qty: 30 CAPSULE | Refills: 0 | Status: SHIPPED | OUTPATIENT
Start: 2020-11-09 | End: 2021-01-21 | Stop reason: SDUPTHER

## 2020-11-09 RX ORDER — FLUOROMETHOLONE 0.1 %
1 SUSPENSION, DROPS(FINAL DOSAGE FORM)(ML) OPHTHALMIC (EYE) 2 TIMES DAILY
COMMUNITY
Start: 2020-10-30

## 2020-11-09 NOTE — PROGRESS NOTES
Subjective   Choco Bryant is a 55 y.o. male.     Pt is here for an annual exam  He is due for labs  Also due for colon cancer screening  Would rather do cologuard    Overall, he's been feeling much better  Anxiety and Depression are both much better  He sleeps fairly well  But does still need klonopin periodically  He still has trouble with memory and focus, not any worse than before  But not any better  adderall continues to work well for the most part    No chest pain  No SOA  No abd pain, N/V/D  No fever, cough  No dysuria  No headache  No dizziness  No palpitations    Hernia is not necessarily more uncomfortable  But has lost weight  And would like to get it repaired       The following portions of the patient's history were reviewed and updated as appropriate: allergies, current medications, past family history, past medical history, past social history, past surgical history and problem list.    Review of Systems   Constitutional: Negative for fatigue and fever.   HENT: Negative for congestion, ear pain, rhinorrhea and sore throat.    Eyes: Negative for blurred vision and itching.   Respiratory: Negative for cough and shortness of breath.    Cardiovascular: Negative for chest pain and palpitations.   Gastrointestinal: Negative for abdominal pain, diarrhea and vomiting.   Endocrine: Negative for polydipsia and polyuria.   Genitourinary: Negative for dysuria, frequency, hematuria and urgency.   Musculoskeletal: Negative for joint swelling and myalgias.   Skin: Negative for rash and skin lesions.   Neurological: Positive for memory problem. Negative for dizziness, numbness and headache.   Psychiatric/Behavioral: Positive for decreased concentration, sleep disturbance and stress. Negative for depressed mood. The patient is not nervous/anxious.          Current Outpatient Medications:   •  albuterol sulfate HFA (PROAIR HFA) 108 (90 Base) MCG/ACT inhaler, INL 2 PFS PO Q 4 H FOR UP TO 3 DAYS PRF WHZ OR SOB, Disp: ,  "Rfl:   •  amLODIPine (NORVASC) 10 MG tablet, Take 1 tablet by mouth Daily., Disp: 90 tablet, Rfl: 1  •  amphetamine-dextroamphetamine XR (Adderall XR) 30 MG 24 hr capsule, Take 1 capsule by mouth Every Morning, Disp: 30 capsule, Rfl: 0  •  clonazePAM (KlonoPIN) 2 MG tablet, Take 1 tablet by mouth At Night As Needed for Anxiety., Disp: 30 tablet, Rfl: 2  •  fluorometholone (FML) 0.1 % ophthalmic suspension, INSTILL 1 DROP INTO LEFT EYE TWICE DAILY, Disp: , Rfl:   •  losartan (COZAAR) 100 MG tablet, Take 1 tablet by mouth Daily., Disp: 90 tablet, Rfl: 1  •  Omeprazole (CVS OMEPRAZOLE) 20 MG tablet delayed-release, CVS OMEPRAZOLE 20 MG TBEC, Disp: , Rfl:   •  sildenafil (REVATIO) 20 MG tablet, Take 1-5 tablets by mouth Daily As Needed (intercourse)., Disp: 50 tablet, Rfl: 3  •  tamsulosin (FLOMAX) 0.4 MG capsule 24 hr capsule, Take 1 capsule by mouth Daily., Disp: 90 capsule, Rfl: 1  •  Vortioxetine HBr (TRINTELLIX) 10 MG tablet, Take 10 mg by mouth Daily., Disp: 90 tablet, Rfl: 0  •  FLONASE ALLERGY RELIEF 50 MCG/ACT nasal spray, USE 2 SPRAYS IN EACH NOSTRIL QD, Disp: , Rfl: 2  •  pravastatin (PRAVACHOL) 40 MG tablet, TAKE 1 TABLET BY MOUTH DAILY, Disp: 90 tablet, Rfl: 0    Objective   /90 (BP Location: Right arm, Patient Position: Sitting, Cuff Size: Adult)   Pulse 88   Temp 97.1 °F (36.2 °C) (Temporal)   Resp 16   Ht 179.1 cm (70.5\")   Wt 97.4 kg (214 lb 12.8 oz)   SpO2 98%   BMI 30.38 kg/m²   Physical Exam  Vitals signs reviewed.   Constitutional:       General: He is not in acute distress.     Appearance: He is well-developed. He is not diaphoretic.   HENT:      Head: Normocephalic and atraumatic.      Right Ear: External ear normal.      Left Ear: External ear normal.      Nose: Nose normal.      Mouth/Throat:      Pharynx: No oropharyngeal exudate.   Eyes:      General: No scleral icterus.        Right eye: No discharge.         Left eye: No discharge.      Conjunctiva/sclera: Conjunctivae normal. "   Neck:      Musculoskeletal: Normal range of motion and neck supple.      Thyroid: No thyromegaly.   Cardiovascular:      Rate and Rhythm: Normal rate and regular rhythm.      Heart sounds: Normal heart sounds. No murmur. No friction rub. No gallop.    Pulmonary:      Effort: Pulmonary effort is normal. No respiratory distress.      Breath sounds: Normal breath sounds. No wheezing or rales.   Abdominal:      General: Bowel sounds are normal. There is no distension.      Palpations: Abdomen is soft.      Tenderness: There is no abdominal tenderness. There is no guarding.      Hernia: A hernia is present.   Musculoskeletal: Normal range of motion.         General: No deformity.   Lymphadenopathy:      Cervical: No cervical adenopathy.   Skin:     General: Skin is warm and dry.      Findings: No erythema or rash.   Neurological:      Mental Status: He is alert and oriented to person, place, and time.      Cranial Nerves: No cranial nerve deficit.   Psychiatric:         Behavior: Behavior normal.         Thought Content: Thought content normal.           Assessment/Plan   Problems Addressed this Visit        Other    Attention deficit disorder (ADD) without hyperactivity    Relevant Medications    amphetamine-dextroamphetamine XR (Adderall XR) 30 MG 24 hr capsule    Depression    Relevant Medications    amphetamine-dextroamphetamine XR (Adderall XR) 30 MG 24 hr capsule    Insomnia      Other Visit Diagnoses     Preventative health care    -  Primary    Relevant Orders    CBC Auto Differential (Completed)    Comprehensive Metabolic Panel (Completed)    Lipid Panel (Completed)    TSH (Completed)    PSA Screen (Completed)    Anxiety        Screening for malignant neoplasm of prostate        Relevant Orders    PSA Screen (Completed)    Screening for colon cancer        Relevant Orders    Cologuard - Stool, Per Rectum      Diagnoses       Codes Comments    Preventative health care    -  Primary ICD-10-CM: Z00.00  ICD-9-CM:  V70.0     Attention deficit disorder (ADD) without hyperactivity     ICD-10-CM: F98.8  ICD-9-CM: 314.00     Recurrent major depressive disorder, in full remission (CMS/HCC)     ICD-10-CM: F33.42  ICD-9-CM: 296.36     Anxiety     ICD-10-CM: F41.9  ICD-9-CM: 300.00     Insomnia, unspecified type     ICD-10-CM: G47.00  ICD-9-CM: 780.52     Screening for malignant neoplasm of prostate     ICD-10-CM: Z12.5  ICD-9-CM: V76.44     Screening for colon cancer     ICD-10-CM: Z12.11  ICD-9-CM: V76.51           Since pt has been sleeping better and anxiety has been better,  Encouraged pt to try decreasing klonopin to 1/2 tab (1mg) qhs prn  Continue adderall xr  Continue trintellix  Order cologuard  Check labs today  Continue working on diet/exercise       Procedures

## 2020-11-12 ENCOUNTER — LAB (OUTPATIENT)
Dept: LAB | Facility: HOSPITAL | Age: 55
End: 2020-11-12

## 2020-11-12 ENCOUNTER — HOSPITAL ENCOUNTER (OUTPATIENT)
Dept: CARDIOLOGY | Facility: HOSPITAL | Age: 55
Discharge: HOME | End: 2020-11-12

## 2020-11-12 DIAGNOSIS — Z00.00 PREVENTATIVE HEALTH CARE: ICD-10-CM

## 2020-11-12 DIAGNOSIS — Z12.5 SCREENING FOR MALIGNANT NEOPLASM OF PROSTATE: ICD-10-CM

## 2020-11-12 LAB
ALBUMIN SERPL-MCNC: 4.3 G/DL (ref 3.5–5.2)
ALBUMIN/GLOB SERPL: 2 G/DL
ALP SERPL-CCNC: 102 U/L (ref 39–117)
ALT SERPL W P-5'-P-CCNC: 15 U/L (ref 1–41)
ANION GAP SERPL CALCULATED.3IONS-SCNC: 8.1 MMOL/L (ref 5–15)
AST SERPL-CCNC: 15 U/L (ref 1–40)
BASOPHILS # BLD AUTO: 0.04 10*3/MM3 (ref 0–0.2)
BASOPHILS NFR BLD AUTO: 0.7 % (ref 0–1.5)
BILIRUB SERPL-MCNC: 0.3 MG/DL (ref 0–1.2)
BUN SERPL-MCNC: 23 MG/DL (ref 6–20)
BUN/CREAT SERPL: 22.8 (ref 7–25)
CALCIUM SPEC-SCNC: 8.9 MG/DL (ref 8.6–10.5)
CHLORIDE SERPL-SCNC: 104 MMOL/L (ref 98–107)
CHOLEST SERPL-MCNC: 241 MG/DL (ref 0–200)
CO2 SERPL-SCNC: 26.9 MMOL/L (ref 22–29)
CREAT SERPL-MCNC: 1.01 MG/DL (ref 0.76–1.27)
DEPRECATED RDW RBC AUTO: 40.2 FL (ref 37–54)
EOSINOPHIL # BLD AUTO: 0.08 10*3/MM3 (ref 0–0.4)
EOSINOPHIL NFR BLD AUTO: 1.4 % (ref 0.3–6.2)
ERYTHROCYTE [DISTWIDTH] IN BLOOD BY AUTOMATED COUNT: 12.6 % (ref 12.3–15.4)
GFR SERPL CREATININE-BSD FRML MDRD: 77 ML/MIN/1.73
GLOBULIN UR ELPH-MCNC: 2.2 GM/DL
GLUCOSE SERPL-MCNC: 84 MG/DL (ref 65–99)
HCT VFR BLD AUTO: 40.3 % (ref 37.5–51)
HDLC SERPL-MCNC: 38 MG/DL (ref 40–60)
HGB BLD-MCNC: 13.3 G/DL (ref 13–17.7)
IMM GRANULOCYTES # BLD AUTO: 0.02 10*3/MM3 (ref 0–0.05)
IMM GRANULOCYTES NFR BLD AUTO: 0.4 % (ref 0–0.5)
LDLC SERPL CALC-MCNC: 188 MG/DL (ref 0–100)
LDLC/HDLC SERPL: 4.88 {RATIO}
LYMPHOCYTES # BLD AUTO: 1.38 10*3/MM3 (ref 0.7–3.1)
LYMPHOCYTES NFR BLD AUTO: 24.5 % (ref 19.6–45.3)
MCH RBC QN AUTO: 29 PG (ref 26.6–33)
MCHC RBC AUTO-ENTMCNC: 33 G/DL (ref 31.5–35.7)
MCV RBC AUTO: 88 FL (ref 79–97)
MONOCYTES # BLD AUTO: 0.51 10*3/MM3 (ref 0.1–0.9)
MONOCYTES NFR BLD AUTO: 9.1 % (ref 5–12)
NEUTROPHILS NFR BLD AUTO: 3.6 10*3/MM3 (ref 1.7–7)
NEUTROPHILS NFR BLD AUTO: 63.9 % (ref 42.7–76)
NRBC BLD AUTO-RTO: 0 /100 WBC (ref 0–0.2)
PLATELET # BLD AUTO: 252 10*3/MM3 (ref 140–450)
PMV BLD AUTO: 10.8 FL (ref 6–12)
POTASSIUM SERPL-SCNC: 4 MMOL/L (ref 3.5–5.2)
PROT SERPL-MCNC: 6.5 G/DL (ref 6–8.5)
PSA SERPL-MCNC: 1.6 NG/ML (ref 0–4)
RBC # BLD AUTO: 4.58 10*6/MM3 (ref 4.14–5.8)
SODIUM SERPL-SCNC: 139 MMOL/L (ref 136–145)
TRIGL SERPL-MCNC: 87 MG/DL (ref 0–150)
TSH SERPL DL<=0.05 MIU/L-ACNC: 2.55 UIU/ML (ref 0.27–4.2)
VLDLC SERPL-MCNC: 15 MG/DL (ref 5–40)
WBC # BLD AUTO: 5.63 10*3/MM3 (ref 3.4–10.8)

## 2020-11-12 PROCEDURE — 93005 ELECTROCARDIOGRAM TRACING: CPT | Performed by: SURGERY

## 2020-11-12 PROCEDURE — 36415 COLL VENOUS BLD VENIPUNCTURE: CPT

## 2020-11-12 PROCEDURE — 80061 LIPID PANEL: CPT

## 2020-11-12 PROCEDURE — G0103 PSA SCREENING: HCPCS

## 2020-11-12 PROCEDURE — 80053 COMPREHEN METABOLIC PANEL: CPT

## 2020-11-12 PROCEDURE — 84443 ASSAY THYROID STIM HORMONE: CPT

## 2020-11-12 PROCEDURE — 85025 COMPLETE CBC W/AUTO DIFF WBC: CPT

## 2020-11-12 PROCEDURE — 93010 ELECTROCARDIOGRAM REPORT: CPT | Performed by: INTERNAL MEDICINE

## 2020-11-16 ENCOUNTER — LAB (OUTPATIENT)
Dept: LAB | Facility: HOSPITAL | Age: 55
End: 2020-11-16

## 2020-11-16 PROCEDURE — C9803 HOPD COVID-19 SPEC COLLECT: HCPCS

## 2020-11-16 PROCEDURE — U0004 COV-19 TEST NON-CDC HGH THRU: HCPCS

## 2020-11-17 LAB
QT INTERVAL: 414 MS
SARS-COV-2 RNA RESP QL NAA+PROBE: NOT DETECTED

## 2020-11-17 RX ORDER — ATORVASTATIN CALCIUM 20 MG/1
20 TABLET, FILM COATED ORAL DAILY
Qty: 90 TABLET | Refills: 1 | Status: SHIPPED | OUTPATIENT
Start: 2020-11-17 | End: 2021-09-10 | Stop reason: SDUPTHER

## 2020-11-18 ENCOUNTER — ANESTHESIA (OUTPATIENT)
Dept: PERIOP | Facility: HOSPITAL | Age: 55
End: 2020-11-18

## 2020-11-18 ENCOUNTER — HOSPITAL ENCOUNTER (OUTPATIENT)
Facility: HOSPITAL | Age: 55
Setting detail: HOSPITAL OUTPATIENT SURGERY
Discharge: HOME | End: 2020-11-18
Attending: SURGERY | Admitting: SURGERY

## 2020-11-18 ENCOUNTER — ANESTHESIA EVENT (OUTPATIENT)
Dept: PERIOP | Facility: HOSPITAL | Age: 55
End: 2020-11-18

## 2020-11-18 VITALS
DIASTOLIC BLOOD PRESSURE: 59 MMHG | RESPIRATION RATE: 12 BRPM | SYSTOLIC BLOOD PRESSURE: 109 MMHG | TEMPERATURE: 96.1 F | HEART RATE: 86 BPM | HEIGHT: 70 IN | OXYGEN SATURATION: 99 % | BODY MASS INDEX: 30.06 KG/M2 | WEIGHT: 210 LBS

## 2020-11-18 DIAGNOSIS — K42.9 UMBILICAL HERNIA WITHOUT OBSTRUCTION AND WITHOUT GANGRENE: ICD-10-CM

## 2020-11-18 PROCEDURE — 49585 PR REPAIR UMBILICAL HERN,5+Y/O,REDUC: CPT | Performed by: NURSE PRACTITIONER

## 2020-11-18 PROCEDURE — 25010000002 FENTANYL CITRATE (PF) 100 MCG/2ML SOLUTION: Performed by: NURSE ANESTHETIST, CERTIFIED REGISTERED

## 2020-11-18 PROCEDURE — 25010000002 ONDANSETRON PER 1 MG: Performed by: NURSE ANESTHETIST, CERTIFIED REGISTERED

## 2020-11-18 PROCEDURE — 25010000002 PROPOFOL 10 MG/ML EMULSION: Performed by: NURSE ANESTHETIST, CERTIFIED REGISTERED

## 2020-11-18 PROCEDURE — 25010000002 MIDAZOLAM PER 1 MG: Performed by: NURSE ANESTHETIST, CERTIFIED REGISTERED

## 2020-11-18 PROCEDURE — 25010000002 DEXAMETHASONE PER 1 MG: Performed by: NURSE ANESTHETIST, CERTIFIED REGISTERED

## 2020-11-18 PROCEDURE — 49585 PR REPAIR UMBILICAL HERN,5+Y/O,REDUC: CPT | Performed by: SURGERY

## 2020-11-18 PROCEDURE — C1781 MESH (IMPLANTABLE): HCPCS | Performed by: SURGERY

## 2020-11-18 DEVICE — VENTRALEX ST HERNIA PATCH
Type: IMPLANTABLE DEVICE | Site: ABDOMEN | Status: FUNCTIONAL
Brand: VENTRALEX ST HERNIA PATCH

## 2020-11-18 RX ORDER — ROCURONIUM BROMIDE 10 MG/ML
INJECTION, SOLUTION INTRAVENOUS AS NEEDED
Status: DISCONTINUED | OUTPATIENT
Start: 2020-11-18 | End: 2020-11-18 | Stop reason: SURG

## 2020-11-18 RX ORDER — SODIUM CHLORIDE 0.9 % (FLUSH) 0.9 %
10 SYRINGE (ML) INJECTION AS NEEDED
Status: DISCONTINUED | OUTPATIENT
Start: 2020-11-18 | End: 2020-11-18 | Stop reason: HOSPADM

## 2020-11-18 RX ORDER — HYDROMORPHONE HCL 110MG/55ML
0.25 PATIENT CONTROLLED ANALGESIA SYRINGE INTRAVENOUS
Status: DISCONTINUED | OUTPATIENT
Start: 2020-11-18 | End: 2020-11-18 | Stop reason: HOSPADM

## 2020-11-18 RX ORDER — HYDROCODONE BITARTRATE AND ACETAMINOPHEN 5; 325 MG/1; MG/1
1 TABLET ORAL EVERY 4 HOURS PRN
Qty: 10 TABLET | Refills: 0 | Status: SHIPPED | OUTPATIENT
Start: 2020-11-18 | End: 2020-12-21

## 2020-11-18 RX ORDER — PHENYLEPHRINE HCL IN 0.9% NACL 0.5 MG/5ML
SYRINGE (ML) INTRAVENOUS AS NEEDED
Status: DISCONTINUED | OUTPATIENT
Start: 2020-11-18 | End: 2020-11-18 | Stop reason: SURG

## 2020-11-18 RX ORDER — DEXAMETHASONE SODIUM PHOSPHATE 4 MG/ML
INJECTION, SOLUTION INTRA-ARTICULAR; INTRALESIONAL; INTRAMUSCULAR; INTRAVENOUS; SOFT TISSUE AS NEEDED
Status: DISCONTINUED | OUTPATIENT
Start: 2020-11-18 | End: 2020-11-18 | Stop reason: SURG

## 2020-11-18 RX ORDER — LABETALOL HYDROCHLORIDE 5 MG/ML
5 INJECTION, SOLUTION INTRAVENOUS
Status: DISCONTINUED | OUTPATIENT
Start: 2020-11-18 | End: 2020-11-18 | Stop reason: HOSPADM

## 2020-11-18 RX ORDER — ONDANSETRON 2 MG/ML
INJECTION INTRAMUSCULAR; INTRAVENOUS AS NEEDED
Status: DISCONTINUED | OUTPATIENT
Start: 2020-11-18 | End: 2020-11-18 | Stop reason: SURG

## 2020-11-18 RX ORDER — FENTANYL CITRATE 50 UG/ML
INJECTION, SOLUTION INTRAMUSCULAR; INTRAVENOUS AS NEEDED
Status: DISCONTINUED | OUTPATIENT
Start: 2020-11-18 | End: 2020-11-18 | Stop reason: SURG

## 2020-11-18 RX ORDER — SODIUM CHLORIDE, SODIUM LACTATE, POTASSIUM CHLORIDE, CALCIUM CHLORIDE 600; 310; 30; 20 MG/100ML; MG/100ML; MG/100ML; MG/100ML
INJECTION, SOLUTION INTRAVENOUS CONTINUOUS PRN
Status: DISCONTINUED | OUTPATIENT
Start: 2020-11-18 | End: 2020-11-18 | Stop reason: SURG

## 2020-11-18 RX ORDER — MIDAZOLAM HYDROCHLORIDE 1 MG/ML
INJECTION INTRAMUSCULAR; INTRAVENOUS AS NEEDED
Status: DISCONTINUED | OUTPATIENT
Start: 2020-11-18 | End: 2020-11-18 | Stop reason: SURG

## 2020-11-18 RX ORDER — NEOSTIGMINE METHYLSULFATE 5 MG/5 ML
SYRINGE (ML) INTRAVENOUS AS NEEDED
Status: DISCONTINUED | OUTPATIENT
Start: 2020-11-18 | End: 2020-11-18 | Stop reason: SURG

## 2020-11-18 RX ORDER — HYDROMORPHONE HCL 110MG/55ML
0.5 PATIENT CONTROLLED ANALGESIA SYRINGE INTRAVENOUS
Status: DISCONTINUED | OUTPATIENT
Start: 2020-11-18 | End: 2020-11-18 | Stop reason: HOSPADM

## 2020-11-18 RX ORDER — EPHEDRINE SULFATE/0.9% NACL/PF 25 MG/5 ML
SYRINGE (ML) INTRAVENOUS AS NEEDED
Status: DISCONTINUED | OUTPATIENT
Start: 2020-11-18 | End: 2020-11-18 | Stop reason: SURG

## 2020-11-18 RX ORDER — GLYCOPYRROLATE 1 MG/5 ML
SYRINGE (ML) INTRAVENOUS AS NEEDED
Status: DISCONTINUED | OUTPATIENT
Start: 2020-11-18 | End: 2020-11-18 | Stop reason: SURG

## 2020-11-18 RX ORDER — PROPOFOL 10 MG/ML
VIAL (ML) INTRAVENOUS AS NEEDED
Status: DISCONTINUED | OUTPATIENT
Start: 2020-11-18 | End: 2020-11-18 | Stop reason: SURG

## 2020-11-18 RX ORDER — BUPIVACAINE HYDROCHLORIDE 2.5 MG/ML
INJECTION, SOLUTION EPIDURAL; INFILTRATION; INTRACAUDAL AS NEEDED
Status: DISCONTINUED | OUTPATIENT
Start: 2020-11-18 | End: 2020-11-18 | Stop reason: HOSPADM

## 2020-11-18 RX ORDER — ONDANSETRON 2 MG/ML
4 INJECTION INTRAMUSCULAR; INTRAVENOUS ONCE AS NEEDED
Status: DISCONTINUED | OUTPATIENT
Start: 2020-11-18 | End: 2020-11-18 | Stop reason: HOSPADM

## 2020-11-18 RX ORDER — LIDOCAINE HYDROCHLORIDE 20 MG/ML
INJECTION, SOLUTION EPIDURAL; INFILTRATION; INTRACAUDAL; PERINEURAL AS NEEDED
Status: DISCONTINUED | OUTPATIENT
Start: 2020-11-18 | End: 2020-11-18 | Stop reason: SURG

## 2020-11-18 RX ORDER — NALOXONE HCL 0.4 MG/ML
0.4 VIAL (ML) INJECTION AS NEEDED
Status: DISCONTINUED | OUTPATIENT
Start: 2020-11-18 | End: 2020-11-18 | Stop reason: HOSPADM

## 2020-11-18 RX ORDER — SODIUM CHLORIDE 9 MG/ML
100 INJECTION, SOLUTION INTRAVENOUS CONTINUOUS
Status: DISCONTINUED | OUTPATIENT
Start: 2020-11-18 | End: 2020-11-18 | Stop reason: HOSPADM

## 2020-11-18 RX ADMIN — LIDOCAINE HYDROCHLORIDE 100 MG: 20 INJECTION, SOLUTION EPIDURAL; INFILTRATION; INTRACAUDAL; PERINEURAL at 07:19

## 2020-11-18 RX ADMIN — ONDANSETRON 4 MG: 2 INJECTION INTRAMUSCULAR; INTRAVENOUS at 07:28

## 2020-11-18 RX ADMIN — Medication 10 MG: at 07:38

## 2020-11-18 RX ADMIN — ROCURONIUM BROMIDE 30 MG: 10 INJECTION, SOLUTION INTRAVENOUS at 07:19

## 2020-11-18 RX ADMIN — PHENYLEPHRINE HYDROCHLORIDE 100 MCG: 10 INJECTION INTRAVENOUS at 07:41

## 2020-11-18 RX ADMIN — SODIUM CHLORIDE 100 ML/HR: 9 INJECTION, SOLUTION INTRAVENOUS at 06:16

## 2020-11-18 RX ADMIN — PHENYLEPHRINE HYDROCHLORIDE 150 MCG: 10 INJECTION INTRAVENOUS at 07:46

## 2020-11-18 RX ADMIN — Medication 3 MG: at 07:53

## 2020-11-18 RX ADMIN — Medication 0.6 MG: at 07:53

## 2020-11-18 RX ADMIN — SODIUM CHLORIDE, SODIUM LACTATE, POTASSIUM CHLORIDE, AND CALCIUM CHLORIDE: .6; .31; .03; .02 INJECTION, SOLUTION INTRAVENOUS at 07:14

## 2020-11-18 RX ADMIN — PROPOFOL 200 MG: 10 INJECTION, EMULSION INTRAVENOUS at 07:19

## 2020-11-18 RX ADMIN — Medication 5 MG: at 07:35

## 2020-11-18 RX ADMIN — CEFAZOLIN SODIUM 2 G: 1 INJECTION, POWDER, FOR SOLUTION INTRAMUSCULAR; INTRAVENOUS at 07:26

## 2020-11-18 RX ADMIN — Medication 10 MG: at 07:46

## 2020-11-18 RX ADMIN — DEXAMETHASONE SODIUM PHOSPHATE 4 MG: 4 INJECTION, SOLUTION INTRAMUSCULAR; INTRAVENOUS at 07:28

## 2020-11-18 RX ADMIN — FENTANYL CITRATE 100 MCG: 50 INJECTION, SOLUTION INTRAMUSCULAR; INTRAVENOUS at 07:19

## 2020-11-18 RX ADMIN — MIDAZOLAM 2 MG: 1 INJECTION INTRAMUSCULAR; INTRAVENOUS at 07:16

## 2020-11-18 NOTE — ANESTHESIA PROCEDURE NOTES
Airway  Urgency: elective    Date/Time: 11/18/2020 7:21 AM  Airway not difficult    General Information and Staff    Patient location during procedure: OR    Indications and Patient Condition  Indications for airway management: airway protection    Preoxygenated: yes  MILS maintained throughout  Mask difficulty assessment: 1 - vent by mask    Final Airway Details  Final airway type: endotracheal airway      Successful airway: ETT  Cuffed: yes   Successful intubation technique: direct laryngoscopy  Facilitating devices/methods: intubating stylet  Endotracheal tube insertion site: oral  Blade: Humera  Blade size: 4  ETT size (mm): 7.5  Cormack-Lehane Classification: grade I - full view of glottis  Placement verified by: chest auscultation and capnometry   Measured from: lips  ETT/EBT  to lips (cm): 22  Number of attempts at approach: 1  Assessment: lips, teeth, and gum same as pre-op and atraumatic intubation

## 2020-11-18 NOTE — ANESTHESIA POSTPROCEDURE EVALUATION
Patient: Choco Bryant    Procedure Summary     Date: 11/18/20 Room / Location: [facility] OR 10 / [facility] MAIN OR    Anesthesia Start: 0714 Anesthesia Stop: 0804    Procedure: UMBILICAL HERNIA REPAIR with mesh (N/A Abdomen) Diagnosis:       Umbilical hernia without obstruction and without gangrene      (Umbilical hernia without obstruction and without gangrene [K42.9])    Surgeon: Fredi Kaye MD Provider: Felix Sanches MD    Anesthesia Type: general ASA Status: 3          Anesthesia Type: general    Vitals  Vitals Value Taken Time   /66 11/18/20 0839   Temp 97.4 °F (36.3 °C) 11/18/20 0839   Pulse 80 11/18/20 0842   Resp 11 11/18/20 0839   SpO2 99 % 11/18/20 0842   Vitals shown include unvalidated device data.        Post Anesthesia Care and Evaluation    Patient location during evaluation: PACU  Patient participation: complete - patient participated  Level of consciousness: awake  Pain scale: See nurse's notes for pain score.  Pain management: adequate  Airway patency: patent  Anesthetic complications: No anesthetic complications  PONV Status: none  Cardiovascular status: acceptable  Respiratory status: acceptable  Hydration status: acceptable    Comments: Patient seen and examined postoperatively; vital signs stable; SpO2 greater than or equal to 90%; cardiopulmonary status stable; nausea/vomiting adequately controlled; pain adequately controlled; no apparent anesthesia complications; patient discharged from anesthesia care when discharge criteria were met

## 2020-11-18 NOTE — ANESTHESIA PREPROCEDURE EVALUATION
Anesthesia Evaluation     Patient summary reviewed and Nursing notes reviewed   NPO Solid Status: > 8 hours  NPO Liquid Status: > 8 hours           Airway   Mallampati: II  TM distance: >3 FB  Neck ROM: full  No difficulty expected  Dental      Pulmonary    (+) sleep apnea,   Cardiovascular     (+) hypertension, dysrhythmias, hyperlipidemia,       Neuro/Psych  GI/Hepatic/Renal/Endo    (+)  GERD well controlled,      Musculoskeletal     Abdominal     Bowel sounds: normal.   Substance History      OB/GYN          Other        ROS/Med Hx Other: Hx of SVT s/p ablation                Anesthesia Plan    ASA 3     general     intravenous induction     Anesthetic plan, all risks, benefits, and alternatives have been provided, discussed and informed consent has been obtained with: patient.    Plan discussed with CRNA and CAA.

## 2020-11-18 NOTE — OP NOTE
Operative Report:    Patient Name:  Choco Bryant  YOB: 1965    Date of Surgery:  11/18/2020     Indications: 55-year-old man who I met in the office to discuss the risks and benefits of repair of an umbilical hernia.  He understood the risks of surgery he did wish to proceed.    Pre-op Diagnosis:   Umbilical hernia without obstruction and without gangrene [K42.9]       Post-Op Diagnosis Codes:     * Umbilical hernia without obstruction and without gangrene [K42.9]    Procedure/CPT® Codes:      Procedure(s):  UMBILICAL HERNIA REPAIR with mesh    Staff:  Surgeon(s):  Fredi Kaye MD    Assistant: Alee Joy APRN    Anesthesia: General    Estimated Blood Loss: 2 mL    Implants:    Implant Name Type Inv. Item Serial No.  Lot No. LRB No. Used Action   MESH MYLES VENTRALEX ST W/STRAP 1.7 - [id number] Implant MESH MYLES VENTRALEX ST W/STRAP 1.7  [employer]  (DIV OF [employer]) [id number] N/A 1 Implanted       Specimen:          None        Findings: Umbilical hernia containing preperitoneal fat    Complications: None    Description of Procedure: Patient was taken to the operating room placed on the operating table in the supine position under general anesthesia.  His abdomen was prepped and draped normal sterile fashion.  Timeout was performed identifying correct patient procedure and site of operation.  I began the operation by infiltrating the skin and subcutaneous tissue in the infraumbilical location with local anesthetic.  I then made a curvilinear infraumbilical incision with a 10 blade scalpel.  Dissection through subcutaneous tissue was performed with the Bovie.  The umbilical stalk was encircled the hernia sac was dissected free off of the umbilical skin.  The hernia sac was then reduced into the abdomen.  The fascial edges were freed circumferentially posteriorly.  I then selected a 4.3 cm circular mesh and placed it through a 1 cm circular hernia defect.  It laid flat and opposition to  the abdominal wall.  The tails were secured laterally using 0 Ethibond suture.  The fascia of the abdominal wall in the supraumbilical location was quite thin but there was no additional notable hernia defects.  I then closed the hernia defect over the mesh using interrupted 0 Ethibond sutures.  The wound was irrigated the umbilical stalk was secured to the abdominal wall using 3-0 Vicryl suture the dermis was closed with 3-0 Vicryl suture the skin was closed with running 4-0 Vicryl suture with skin affix placed on the wound.  At the completion of the case the patient was transferred to recovery in good condition.      Fredi Kaye MD     Date: 11/18/2020  Time: 08:14 EST    This note was created using Dragon Voice Recognition software.

## 2020-12-21 ENCOUNTER — OFFICE VISIT (OUTPATIENT)
Dept: FAMILY MEDICINE CLINIC | Facility: CLINIC | Age: 55
End: 2020-12-21

## 2020-12-21 VITALS
WEIGHT: 213 LBS | TEMPERATURE: 96.9 F | OXYGEN SATURATION: 99 % | HEIGHT: 70 IN | BODY MASS INDEX: 30.49 KG/M2 | DIASTOLIC BLOOD PRESSURE: 104 MMHG | HEART RATE: 68 BPM | SYSTOLIC BLOOD PRESSURE: 148 MMHG

## 2020-12-21 DIAGNOSIS — I10 ESSENTIAL HYPERTENSION: ICD-10-CM

## 2020-12-21 DIAGNOSIS — R55 SYNCOPE, UNSPECIFIED SYNCOPE TYPE: Primary | ICD-10-CM

## 2020-12-21 PROBLEM — G47.33 OSA (OBSTRUCTIVE SLEEP APNEA): Status: ACTIVE | Noted: 2020-12-21

## 2020-12-21 PROCEDURE — 99214 OFFICE O/P EST MOD 30 MIN: CPT | Performed by: NURSE PRACTITIONER

## 2020-12-21 PROCEDURE — 93000 ELECTROCARDIOGRAM COMPLETE: CPT | Performed by: NURSE PRACTITIONER

## 2020-12-21 NOTE — PROGRESS NOTES
"Subjective   Choco Bryant is a 55 y.o. male.     Chief Complaint   Patient presents with   • Loss of Consciousness       BP (!) 148/104 (BP Location: Right arm, Patient Position: Sitting, Cuff Size: Adult)   Pulse 68   Temp 96.9 °F (36.1 °C) (Temporal)   Ht 177.8 cm (70\")   Wt 96.6 kg (213 lb)   SpO2 99%   BMI 30.56 kg/m²     BP Readings from Last 3 Encounters:   12/21/20 (!) 148/104   11/18/20 109/59   11/09/20 136/90       Wt Readings from Last 3 Encounters:   12/21/20 96.6 kg (213 lb)   11/18/20 95.3 kg (210 lb)   11/09/20 97.4 kg (214 lb 12.8 oz)       Pt comes in today with c/o syncope that has happened on 2 separate occasions. First episode happened about 2 months ago. States he was getting in the shower and passed out. Not witnessed. No injuries. Felt fatigued and drained afterwards, but otherwise ok. No CP, SOA, palpitations, dizziness. No vision changes. No HA's.   Then again about 3-4 weeks ago he had 2 back-to-back episodes. First it happened again in the bathroom as he was preparing to get in the shower. Wife witnessed it and helped him back to the bedroom where he immediately passed out again. The 2nd time his chest hit the corner of the bedside table and he has a contusion to that area.   Both times was feeling fine prior to episodes. No warning signs.   No heart fluttering or palpitations.  Has had episodes of SVT in past and had an ablation in 2017. Dr. Springer did ablation in 2017 and hasn't had any issues since then.   These episodes seemed different from when he had SVT bouts.   Has not seen Dr. Springer since his ablation.   Both instances happened in the AM when he was fasting. No hx or trouble with hypoglycemia in the past.        The following portions of the patient's history were reviewed and updated as appropriate: allergies, current medications, past family history, past medical history, past social history, past surgical history and problem list.    Review of Systems "   Constitutional: Negative for fatigue.   Eyes: Negative for blurred vision.   Respiratory: Negative for shortness of breath.    Cardiovascular: Negative for chest pain and palpitations.   Neurological: Positive for syncope. Negative for dizziness and headache.       Objective     Physical Exam  Constitutional:       Appearance: He is well-developed.   Eyes:      Pupils: Pupils are equal, round, and reactive to light.   Cardiovascular:      Rate and Rhythm: Normal rate and regular rhythm.      Pulses: Normal pulses.      Heart sounds: No murmur.   Pulmonary:      Effort: Pulmonary effort is normal.      Breath sounds: Normal breath sounds.   Neurological:      Mental Status: He is alert and oriented to person, place, and time.   Psychiatric:         Behavior: Behavior normal.         ECG 12 Lead    Date/Time: 12/21/2020 5:39 PM  Performed by: Jacinta Penn APRN  Authorized by: Jacinta Penn APRN   Previous ECG: no previous ECG available  Rhythm: sinus rhythm    Clinical impression: normal ECG            Diagnoses and all orders for this visit:    1. Syncope, unspecified syncope type (Primary)  -     Ambulatory Referral to Cardiology  -     ECG 12 Lead    2. Essential hypertension  Comments:  repeat /88. We will cont to monitor.     EKG normal  Will refer to Dr. Springer for workup. May need event monitor, tilt table.  During this office visit, we discussed the pertinent aspects of the visit and treatment recommendations. Pt verbalizes understanding. Follow up was discussed. Patient was given the opportunity to ask questions and discuss other concerns.       Return if symptoms worsen or fail to improve.  Answers for HPI/ROS submitted by the patient on 12/21/2020   What is the primary reason for your visit?: Other  Please describe your symptoms.: second episode of passing out and falling.  Have you had these symptoms before?: Yes  How long have you been having these symptoms?: Greater than 2  weeks  Please describe any probable cause for these symptoms. : possibly blood pressure related , but I am on two blood pressure meds . so I don't see how that could be it

## 2021-01-08 DIAGNOSIS — G47.00 INSOMNIA, UNSPECIFIED TYPE: ICD-10-CM

## 2021-01-09 RX ORDER — CLONAZEPAM 2 MG/1
2 TABLET ORAL NIGHTLY PRN
Qty: 30 TABLET | Refills: 1 | Status: SHIPPED | OUTPATIENT
Start: 2021-01-09 | End: 2021-03-19

## 2021-01-21 ENCOUNTER — PATIENT MESSAGE (OUTPATIENT)
Dept: FAMILY MEDICINE CLINIC | Facility: CLINIC | Age: 56
End: 2021-01-21

## 2021-01-21 DIAGNOSIS — F98.8 ATTENTION DEFICIT DISORDER (ADD) WITHOUT HYPERACTIVITY: ICD-10-CM

## 2021-01-21 RX ORDER — DEXTROAMPHETAMINE SACCHARATE, AMPHETAMINE ASPARTATE MONOHYDRATE, DEXTROAMPHETAMINE SULFATE AND AMPHETAMINE SULFATE 7.5; 7.5; 7.5; 7.5 MG/1; MG/1; MG/1; MG/1
30 CAPSULE, EXTENDED RELEASE ORAL EVERY MORNING
Qty: 30 CAPSULE | Refills: 0 | Status: SHIPPED | OUTPATIENT
Start: 2021-01-21 | End: 2021-01-21 | Stop reason: SDUPTHER

## 2021-01-21 RX ORDER — DEXTROAMPHETAMINE SACCHARATE, AMPHETAMINE ASPARTATE MONOHYDRATE, DEXTROAMPHETAMINE SULFATE AND AMPHETAMINE SULFATE 7.5; 7.5; 7.5; 7.5 MG/1; MG/1; MG/1; MG/1
30 CAPSULE, EXTENDED RELEASE ORAL EVERY MORNING
Qty: 30 CAPSULE | Refills: 0 | Status: SHIPPED | OUTPATIENT
Start: 2021-01-21 | End: 2021-05-04 | Stop reason: SDUPTHER

## 2021-01-21 NOTE — TELEPHONE ENCOUNTER
From: Choco Bryant  To: PAVITHRA Merrill  Sent: 1/21/2021 3:22 PM EST  Subject: Referral Request    The refill page says that my prescription can't be refilled through MyChart.     I need a refill on D-amphetamine er 30mg 24 hr once daily

## 2021-02-03 ENCOUNTER — PATIENT MESSAGE (OUTPATIENT)
Dept: FAMILY MEDICINE CLINIC | Facility: CLINIC | Age: 56
End: 2021-02-03

## 2021-02-03 RX ORDER — AMOXICILLIN 875 MG/1
875 TABLET, COATED ORAL EVERY 12 HOURS SCHEDULED
Qty: 14 TABLET | Refills: 0 | Status: SHIPPED | OUTPATIENT
Start: 2021-02-03 | End: 2021-02-10

## 2021-02-03 NOTE — TELEPHONE ENCOUNTER
From: Choco Bryant  To: Jacinta Penn, PAVITHRA  Sent: 2/3/2021 8:54 AM EST  Subject: Prescription Question    I was wondering if you could prescribe an antibiotic for a sinus infection. I have had pain and pressure in my lower sinus cavities,(mainly my left side).

## 2021-02-18 ENCOUNTER — APPOINTMENT (OUTPATIENT)
Dept: GENERAL RADIOLOGY | Facility: HOSPITAL | Age: 56
End: 2021-02-18

## 2021-02-18 ENCOUNTER — APPOINTMENT (OUTPATIENT)
Dept: CARDIOLOGY | Facility: HOSPITAL | Age: 56
End: 2021-02-18

## 2021-02-18 ENCOUNTER — HOSPITAL ENCOUNTER (OUTPATIENT)
Facility: HOSPITAL | Age: 56
Setting detail: OBSERVATION
Discharge: HOME | End: 2021-02-19
Attending: INTERNAL MEDICINE | Admitting: INTERNAL MEDICINE

## 2021-02-18 ENCOUNTER — APPOINTMENT (OUTPATIENT)
Dept: CT IMAGING | Facility: HOSPITAL | Age: 56
End: 2021-02-18

## 2021-02-18 DIAGNOSIS — R55 SYNCOPE, UNSPECIFIED SYNCOPE TYPE: Primary | ICD-10-CM

## 2021-02-18 PROBLEM — G47.33 OSA (OBSTRUCTIVE SLEEP APNEA): Chronic | Status: ACTIVE | Noted: 2020-12-21

## 2021-02-18 PROBLEM — F98.8 ATTENTION DEFICIT DISORDER (ADD) WITHOUT HYPERACTIVITY: Chronic | Status: ACTIVE | Noted: 2017-07-13

## 2021-02-18 PROBLEM — E66.3 OVERWEIGHT (BMI 25.0-29.9): Chronic | Status: ACTIVE | Noted: 2021-02-18

## 2021-02-18 PROBLEM — G47.00 INSOMNIA: Chronic | Status: ACTIVE | Noted: 2017-04-07

## 2021-02-18 PROBLEM — K42.9 UMBILICAL HERNIA WITHOUT OBSTRUCTION AND WITHOUT GANGRENE: Status: RESOLVED | Noted: 2020-10-30 | Resolved: 2021-02-18

## 2021-02-18 PROBLEM — F32.A DEPRESSION: Chronic | Status: ACTIVE | Noted: 2017-07-13

## 2021-02-18 LAB
ALBUMIN SERPL-MCNC: 4.3 G/DL (ref 3.5–5.2)
ALBUMIN/GLOB SERPL: 1.7 G/DL
ALP SERPL-CCNC: 123 U/L (ref 39–117)
ALT SERPL W P-5'-P-CCNC: 12 U/L (ref 1–41)
ANION GAP SERPL CALCULATED.3IONS-SCNC: 9 MMOL/L (ref 5–15)
AST SERPL-CCNC: 16 U/L (ref 1–40)
BASOPHILS # BLD AUTO: 0 10*3/MM3 (ref 0–0.2)
BASOPHILS NFR BLD AUTO: 0.6 % (ref 0–1.5)
BH CV ECHO MEAS - ACS: 2.2 CM
BH CV ECHO MEAS - AO MAX PG (FULL): -0.05 MMHG
BH CV ECHO MEAS - AO MAX PG: 4.3 MMHG
BH CV ECHO MEAS - AO MEAN PG (FULL): 0.43 MMHG
BH CV ECHO MEAS - AO MEAN PG: 2.4 MMHG
BH CV ECHO MEAS - AO ROOT AREA (BSA CORRECTED): 1.7
BH CV ECHO MEAS - AO ROOT AREA: 9.7 CM^2
BH CV ECHO MEAS - AO ROOT DIAM: 3.5 CM
BH CV ECHO MEAS - AO V2 MAX: 103.4 CM/SEC
BH CV ECHO MEAS - AO V2 MEAN: 73.1 CM/SEC
BH CV ECHO MEAS - AO V2 VTI: 19.6 CM
BH CV ECHO MEAS - ASC AORTA: 2.5 CM
BH CV ECHO MEAS - AVA(I,A): 4.5 CM^2
BH CV ECHO MEAS - AVA(I,D): 4.5 CM^2
BH CV ECHO MEAS - AVA(V,A): 4.7 CM^2
BH CV ECHO MEAS - AVA(V,D): 4.7 CM^2
BH CV ECHO MEAS - BSA(HAYCOCK): 2.2 M^2
BH CV ECHO MEAS - BSA: 2.1 M^2
BH CV ECHO MEAS - BZI_BMI: 29.1 KILOGRAMS/M^2
BH CV ECHO MEAS - BZI_METRIC_HEIGHT: 177.8 CM
BH CV ECHO MEAS - BZI_METRIC_WEIGHT: 92.1 KG
BH CV ECHO MEAS - EDV(CUBED): 135.1 ML
BH CV ECHO MEAS - EDV(MOD-SP4): 106.7 ML
BH CV ECHO MEAS - EDV(TEICH): 125.6 ML
BH CV ECHO MEAS - EF(CUBED): 78.5 %
BH CV ECHO MEAS - EF(MOD-BP): 65 %
BH CV ECHO MEAS - EF(MOD-SP4): 65.2 %
BH CV ECHO MEAS - EF(TEICH): 70.5 %
BH CV ECHO MEAS - ESV(CUBED): 29 ML
BH CV ECHO MEAS - ESV(MOD-SP4): 37.2 ML
BH CV ECHO MEAS - ESV(TEICH): 37.1 ML
BH CV ECHO MEAS - FS: 40.1 %
BH CV ECHO MEAS - IVS/LVPW: 1.2
BH CV ECHO MEAS - IVSD: 0.98 CM
BH CV ECHO MEAS - LA DIMENSION(2D): 3.4 CM
BH CV ECHO MEAS - LV DIASTOLIC VOL/BSA (35-75): 50.8 ML/M^2
BH CV ECHO MEAS - LV MASS(C)D: 165.4 GRAMS
BH CV ECHO MEAS - LV MASS(C)DI: 78.7 GRAMS/M^2
BH CV ECHO MEAS - LV MAX PG: 4.3 MMHG
BH CV ECHO MEAS - LV MEAN PG: 2 MMHG
BH CV ECHO MEAS - LV SYSTOLIC VOL/BSA (12-30): 17.7 ML/M^2
BH CV ECHO MEAS - LV V1 MAX: 104 CM/SEC
BH CV ECHO MEAS - LV V1 MEAN: 64.6 CM/SEC
BH CV ECHO MEAS - LV V1 VTI: 18.8 CM
BH CV ECHO MEAS - LVIDD: 5.1 CM
BH CV ECHO MEAS - LVIDS: 3.1 CM
BH CV ECHO MEAS - LVOT AREA: 4.6 CM^2
BH CV ECHO MEAS - LVOT DIAM: 2.4 CM
BH CV ECHO MEAS - LVPWD: 0.83 CM
BH CV ECHO MEAS - MV A MAX VEL: 57.8 CM/SEC
BH CV ECHO MEAS - MV DEC SLOPE: 350.6 CM/SEC^2
BH CV ECHO MEAS - MV DEC TIME: 0.19 SEC
BH CV ECHO MEAS - MV E MAX VEL: 65.9 CM/SEC
BH CV ECHO MEAS - MV E/A: 1.1
BH CV ECHO MEAS - MV MAX PG: 2.4 MMHG
BH CV ECHO MEAS - MV MEAN PG: 1.2 MMHG
BH CV ECHO MEAS - MV V2 MAX: 77.8 CM/SEC
BH CV ECHO MEAS - MV V2 MEAN: 52.1 CM/SEC
BH CV ECHO MEAS - MV V2 VTI: 16.3 CM
BH CV ECHO MEAS - MVA(VTI): 5.3 CM^2
BH CV ECHO MEAS - PA ACC TIME: 0.12 SEC
BH CV ECHO MEAS - PA MAX PG (FULL): 0.9 MMHG
BH CV ECHO MEAS - PA MAX PG: 1.9 MMHG
BH CV ECHO MEAS - PA MEAN PG (FULL): 0.36 MMHG
BH CV ECHO MEAS - PA MEAN PG: 0.99 MMHG
BH CV ECHO MEAS - PA PR(ACCEL): 25 MMHG
BH CV ECHO MEAS - PA V2 MAX: 68.9 CM/SEC
BH CV ECHO MEAS - PA V2 MEAN: 46.1 CM/SEC
BH CV ECHO MEAS - PA V2 VTI: 12.1 CM
BH CV ECHO MEAS - PULM A REVS DUR: 0.11 SEC
BH CV ECHO MEAS - PULM A REVS VEL: 29.6 CM/SEC
BH CV ECHO MEAS - PULM DIAS VEL: 44.9 CM/SEC
BH CV ECHO MEAS - PULM S/D: 0.93
BH CV ECHO MEAS - PULM SYS VEL: 42 CM/SEC
BH CV ECHO MEAS - PVA(I,A): 3.9 CM^2
BH CV ECHO MEAS - PVA(I,D): 3.9 CM^2
BH CV ECHO MEAS - PVA(V,A): 2.9 CM^2
BH CV ECHO MEAS - PVA(V,D): 2.9 CM^2
BH CV ECHO MEAS - QP/QS: 0.54
BH CV ECHO MEAS - RAP SYSTOLE: 3 MMHG
BH CV ECHO MEAS - RV MAX PG: 1 MMHG
BH CV ECHO MEAS - RV MEAN PG: 0.63 MMHG
BH CV ECHO MEAS - RV V1 MAX: 50 CM/SEC
BH CV ECHO MEAS - RV V1 MEAN: 37.7 CM/SEC
BH CV ECHO MEAS - RV V1 VTI: 12 CM
BH CV ECHO MEAS - RVDD: 2.4 CM
BH CV ECHO MEAS - RVOT AREA: 3.9 CM^2
BH CV ECHO MEAS - RVOT DIAM: 2.2 CM
BH CV ECHO MEAS - RVSP: 9.7 MMHG
BH CV ECHO MEAS - SI(AO): 90.6 ML/M^2
BH CV ECHO MEAS - SI(CUBED): 50.5 ML/M^2
BH CV ECHO MEAS - SI(LVOT): 41.5 ML/M^2
BH CV ECHO MEAS - SI(MOD-SP4): 33.1 ML/M^2
BH CV ECHO MEAS - SI(TEICH): 42.1 ML/M^2
BH CV ECHO MEAS - SV(AO): 190.4 ML
BH CV ECHO MEAS - SV(CUBED): 106.1 ML
BH CV ECHO MEAS - SV(LVOT): 87.1 ML
BH CV ECHO MEAS - SV(MOD-SP4): 69.5 ML
BH CV ECHO MEAS - SV(RVOT): 47.2 ML
BH CV ECHO MEAS - SV(TEICH): 88.5 ML
BH CV ECHO MEAS - TR MAX VEL: 129.3 CM/SEC
BH CV XLRA MEAS LEFT CCA RATIO VEL: 134 CM/SEC
BH CV XLRA MEAS LEFT DIST CCA EDV: -26.7 CM/SEC
BH CV XLRA MEAS LEFT DIST CCA PSV: -93.2 CM/SEC
BH CV XLRA MEAS LEFT DIST ICA EDV: -31.8 CM/SEC
BH CV XLRA MEAS LEFT DIST ICA PSV: -72.4 CM/SEC
BH CV XLRA MEAS LEFT ICA RATIO VEL: -82.9 CM/SEC
BH CV XLRA MEAS LEFT ICA/CCA RATIO: -0.62
BH CV XLRA MEAS LEFT PROX CCA EDV: 28.6 CM/SEC
BH CV XLRA MEAS LEFT PROX CCA PSV: 134 CM/SEC
BH CV XLRA MEAS LEFT PROX ECA PSV: -104 CM/SEC
BH CV XLRA MEAS LEFT PROX ICA EDV: -25.8 CM/SEC
BH CV XLRA MEAS LEFT PROX ICA PSV: -82.9 CM/SEC
BH CV XLRA MEAS LEFT PROX SCLA PSV: 174 CM/SEC
BH CV XLRA MEAS LEFT VERTEBRAL A PSV: 47.6 CM/SEC
BH CV XLRA MEAS RIGHT CCA RATIO VEL: 97.5 CM/SEC
BH CV XLRA MEAS RIGHT DIST CCA EDV: -26.1 CM/SEC
BH CV XLRA MEAS RIGHT DIST CCA PSV: -90.7 CM/SEC
BH CV XLRA MEAS RIGHT DIST ICA EDV: -29.1 CM/SEC
BH CV XLRA MEAS RIGHT DIST ICA PSV: -67 CM/SEC
BH CV XLRA MEAS RIGHT ICA RATIO VEL: -75.7 CM/SEC
BH CV XLRA MEAS RIGHT ICA/CCA RATIO: -0.78
BH CV XLRA MEAS RIGHT PROX CCA EDV: 21.7 CM/SEC
BH CV XLRA MEAS RIGHT PROX CCA PSV: 97.5 CM/SEC
BH CV XLRA MEAS RIGHT PROX ECA PSV: -95.1 CM/SEC
BH CV XLRA MEAS RIGHT PROX ICA EDV: -26.3 CM/SEC
BH CV XLRA MEAS RIGHT PROX ICA PSV: -75.7 CM/SEC
BH CV XLRA MEAS RIGHT PROX SCLA PSV: 104 CM/SEC
BH CV XLRA MEAS RIGHT VERTEBRAL A PSV: -72.4 CM/SEC
BILIRUB SERPL-MCNC: 0.5 MG/DL (ref 0–1.2)
BUN SERPL-MCNC: 14 MG/DL (ref 6–20)
BUN/CREAT SERPL: 12.8 (ref 7–25)
CALCIUM SPEC-SCNC: 9.3 MG/DL (ref 8.6–10.5)
CHLORIDE SERPL-SCNC: 104 MMOL/L (ref 98–107)
CO2 SERPL-SCNC: 26 MMOL/L (ref 22–29)
CREAT SERPL-MCNC: 1.09 MG/DL (ref 0.76–1.27)
DEPRECATED RDW RBC AUTO: 42.4 FL (ref 37–54)
EOSINOPHIL # BLD AUTO: 0.1 10*3/MM3 (ref 0–0.4)
EOSINOPHIL NFR BLD AUTO: 1 % (ref 0.3–6.2)
ERYTHROCYTE [DISTWIDTH] IN BLOOD BY AUTOMATED COUNT: 13.8 % (ref 12.3–15.4)
GFR SERPL CREATININE-BSD FRML MDRD: 70 ML/MIN/1.73
GLOBULIN UR ELPH-MCNC: 2.5 GM/DL
GLUCOSE BLDC GLUCOMTR-MCNC: 100 MG/DL (ref 70–105)
GLUCOSE SERPL-MCNC: 96 MG/DL (ref 65–99)
HCT VFR BLD AUTO: 43 % (ref 37.5–51)
HGB BLD-MCNC: 14.7 G/DL (ref 13–17.7)
HOLD SPECIMEN: NORMAL
LV EF 2D ECHO EST: 60 %
LYMPHOCYTES # BLD AUTO: 1.6 10*3/MM3 (ref 0.7–3.1)
LYMPHOCYTES NFR BLD AUTO: 25.1 % (ref 19.6–45.3)
MAGNESIUM SERPL-MCNC: 2.3 MG/DL (ref 1.6–2.6)
MCH RBC QN AUTO: 30.2 PG (ref 26.6–33)
MCHC RBC AUTO-ENTMCNC: 34.2 G/DL (ref 31.5–35.7)
MCV RBC AUTO: 88.2 FL (ref 79–97)
MONOCYTES # BLD AUTO: 0.6 10*3/MM3 (ref 0.1–0.9)
MONOCYTES NFR BLD AUTO: 9.2 % (ref 5–12)
NEUTROPHILS NFR BLD AUTO: 4 10*3/MM3 (ref 1.7–7)
NEUTROPHILS NFR BLD AUTO: 64.1 % (ref 42.7–76)
NRBC BLD AUTO-RTO: 0.1 /100 WBC (ref 0–0.2)
PLATELET # BLD AUTO: 281 10*3/MM3 (ref 140–450)
PMV BLD AUTO: 8.7 FL (ref 6–12)
POTASSIUM SERPL-SCNC: 4.1 MMOL/L (ref 3.5–5.2)
PROT SERPL-MCNC: 6.8 G/DL (ref 6–8.5)
QT INTERVAL: 376 MS
RBC # BLD AUTO: 4.87 10*6/MM3 (ref 4.14–5.8)
SARS-COV-2 ORF1AB RESP QL NAA+PROBE: NOT DETECTED
SODIUM SERPL-SCNC: 139 MMOL/L (ref 136–145)
TROPONIN T SERPL-MCNC: <0.01 NG/ML (ref 0–0.03)
TSH SERPL DL<=0.05 MIU/L-ACNC: 2.32 UIU/ML (ref 0.27–4.2)
WBC # BLD AUTO: 6.2 10*3/MM3 (ref 3.4–10.8)
WHOLE BLOOD HOLD SPECIMEN: NORMAL

## 2021-02-18 PROCEDURE — 93005 ELECTROCARDIOGRAM TRACING: CPT | Performed by: INTERNAL MEDICINE

## 2021-02-18 PROCEDURE — 70450 CT HEAD/BRAIN W/O DYE: CPT

## 2021-02-18 PROCEDURE — G0378 HOSPITAL OBSERVATION PER HR: HCPCS

## 2021-02-18 PROCEDURE — 93306 TTE W/DOPPLER COMPLETE: CPT | Performed by: INTERNAL MEDICINE

## 2021-02-18 PROCEDURE — U0004 COV-19 TEST NON-CDC HGH THRU: HCPCS | Performed by: PHYSICIAN ASSISTANT

## 2021-02-18 PROCEDURE — 99220 PR INITIAL OBSERVATION CARE/DAY 70 MINUTES: CPT | Performed by: INTERNAL MEDICINE

## 2021-02-18 PROCEDURE — 99284 EMERGENCY DEPT VISIT MOD MDM: CPT

## 2021-02-18 PROCEDURE — 84484 ASSAY OF TROPONIN QUANT: CPT | Performed by: NURSE PRACTITIONER

## 2021-02-18 PROCEDURE — 96372 THER/PROPH/DIAG INJ SC/IM: CPT

## 2021-02-18 PROCEDURE — 71045 X-RAY EXAM CHEST 1 VIEW: CPT

## 2021-02-18 PROCEDURE — 25010000002 ENOXAPARIN PER 10 MG: Performed by: NURSE PRACTITIONER

## 2021-02-18 PROCEDURE — 93306 TTE W/DOPPLER COMPLETE: CPT

## 2021-02-18 PROCEDURE — 80053 COMPREHEN METABOLIC PANEL: CPT | Performed by: PHYSICIAN ASSISTANT

## 2021-02-18 PROCEDURE — 83735 ASSAY OF MAGNESIUM: CPT | Performed by: PHYSICIAN ASSISTANT

## 2021-02-18 PROCEDURE — 82962 GLUCOSE BLOOD TEST: CPT

## 2021-02-18 PROCEDURE — 97161 PT EVAL LOW COMPLEX 20 MIN: CPT

## 2021-02-18 PROCEDURE — 93005 ELECTROCARDIOGRAM TRACING: CPT

## 2021-02-18 PROCEDURE — 84484 ASSAY OF TROPONIN QUANT: CPT | Performed by: PHYSICIAN ASSISTANT

## 2021-02-18 PROCEDURE — 84443 ASSAY THYROID STIM HORMONE: CPT | Performed by: NURSE PRACTITIONER

## 2021-02-18 PROCEDURE — 85025 COMPLETE CBC W/AUTO DIFF WBC: CPT | Performed by: PHYSICIAN ASSISTANT

## 2021-02-18 PROCEDURE — C9803 HOPD COVID-19 SPEC COLLECT: HCPCS

## 2021-02-18 PROCEDURE — 93880 EXTRACRANIAL BILAT STUDY: CPT

## 2021-02-18 RX ORDER — HYDROCODONE BITARTRATE AND ACETAMINOPHEN 5; 325 MG/1; MG/1
1 TABLET ORAL EVERY 4 HOURS PRN
COMMUNITY
End: 2021-06-04

## 2021-02-18 RX ORDER — ALUMINA, MAGNESIA, AND SIMETHICONE 2400; 2400; 240 MG/30ML; MG/30ML; MG/30ML
15 SUSPENSION ORAL EVERY 6 HOURS PRN
Status: DISCONTINUED | OUTPATIENT
Start: 2021-02-18 | End: 2021-02-19 | Stop reason: HOSPADM

## 2021-02-18 RX ORDER — POTASSIUM CHLORIDE 20 MEQ/1
40 TABLET, EXTENDED RELEASE ORAL AS NEEDED
Status: DISCONTINUED | OUTPATIENT
Start: 2021-02-18 | End: 2021-02-19 | Stop reason: HOSPADM

## 2021-02-18 RX ORDER — SODIUM CHLORIDE 0.9 % (FLUSH) 0.9 %
10 SYRINGE (ML) INJECTION EVERY 12 HOURS SCHEDULED
Status: DISCONTINUED | OUTPATIENT
Start: 2021-02-18 | End: 2021-02-19 | Stop reason: HOSPADM

## 2021-02-18 RX ORDER — ONDANSETRON 4 MG/1
4 TABLET, FILM COATED ORAL EVERY 6 HOURS PRN
Status: DISCONTINUED | OUTPATIENT
Start: 2021-02-18 | End: 2021-02-19 | Stop reason: HOSPADM

## 2021-02-18 RX ORDER — ACETAMINOPHEN 160 MG/5ML
650 SOLUTION ORAL EVERY 4 HOURS PRN
Status: DISCONTINUED | OUTPATIENT
Start: 2021-02-18 | End: 2021-02-19 | Stop reason: HOSPADM

## 2021-02-18 RX ORDER — SODIUM CHLORIDE 0.9 % (FLUSH) 0.9 %
10 SYRINGE (ML) INJECTION AS NEEDED
Status: DISCONTINUED | OUTPATIENT
Start: 2021-02-18 | End: 2021-02-19 | Stop reason: HOSPADM

## 2021-02-18 RX ORDER — MAGNESIUM SULFATE HEPTAHYDRATE 40 MG/ML
2 INJECTION, SOLUTION INTRAVENOUS AS NEEDED
Status: DISCONTINUED | OUTPATIENT
Start: 2021-02-18 | End: 2021-02-19 | Stop reason: HOSPADM

## 2021-02-18 RX ORDER — NITROGLYCERIN 0.4 MG/1
0.4 TABLET SUBLINGUAL
Status: DISCONTINUED | OUTPATIENT
Start: 2021-02-18 | End: 2021-02-19 | Stop reason: HOSPADM

## 2021-02-18 RX ORDER — DIPHENHYDRAMINE HCL 25 MG
25 TABLET ORAL NIGHTLY PRN
COMMUNITY
End: 2021-02-19 | Stop reason: HOSPADM

## 2021-02-18 RX ORDER — CHOLECALCIFEROL (VITAMIN D3) 125 MCG
5 CAPSULE ORAL NIGHTLY PRN
Status: DISCONTINUED | OUTPATIENT
Start: 2021-02-18 | End: 2021-02-19 | Stop reason: HOSPADM

## 2021-02-18 RX ORDER — ACETAMINOPHEN 650 MG/1
650 SUPPOSITORY RECTAL EVERY 4 HOURS PRN
Status: DISCONTINUED | OUTPATIENT
Start: 2021-02-18 | End: 2021-02-19 | Stop reason: HOSPADM

## 2021-02-18 RX ORDER — ACETAMINOPHEN 325 MG/1
650 TABLET ORAL EVERY 4 HOURS PRN
Status: DISCONTINUED | OUTPATIENT
Start: 2021-02-18 | End: 2021-02-19 | Stop reason: HOSPADM

## 2021-02-18 RX ORDER — MAGNESIUM SULFATE 1 G/100ML
1 INJECTION INTRAVENOUS AS NEEDED
Status: DISCONTINUED | OUTPATIENT
Start: 2021-02-18 | End: 2021-02-19 | Stop reason: HOSPADM

## 2021-02-18 RX ORDER — ONDANSETRON 2 MG/ML
4 INJECTION INTRAMUSCULAR; INTRAVENOUS EVERY 6 HOURS PRN
Status: DISCONTINUED | OUTPATIENT
Start: 2021-02-18 | End: 2021-02-19 | Stop reason: HOSPADM

## 2021-02-18 RX ORDER — BISACODYL 10 MG
10 SUPPOSITORY, RECTAL RECTAL DAILY PRN
Status: DISCONTINUED | OUTPATIENT
Start: 2021-02-18 | End: 2021-02-19 | Stop reason: HOSPADM

## 2021-02-18 RX ORDER — ASPIRIN 325 MG
325 TABLET ORAL DAILY
Status: DISCONTINUED | OUTPATIENT
Start: 2021-02-18 | End: 2021-02-19 | Stop reason: HOSPADM

## 2021-02-18 RX ORDER — CLONAZEPAM 1 MG/1
2 TABLET ORAL NIGHTLY PRN
Status: DISCONTINUED | OUTPATIENT
Start: 2021-02-18 | End: 2021-02-19 | Stop reason: HOSPADM

## 2021-02-18 RX ORDER — ACETAMINOPHEN,DIPHENHYDRAMINE HCL 500; 25 MG/1; MG/1
1 TABLET, FILM COATED ORAL DAILY
COMMUNITY
End: 2021-02-18

## 2021-02-18 RX ORDER — CETIRIZINE HYDROCHLORIDE 10 MG/1
10 TABLET ORAL DAILY
Status: DISCONTINUED | OUTPATIENT
Start: 2021-02-18 | End: 2021-02-19 | Stop reason: HOSPADM

## 2021-02-18 RX ORDER — LORATADINE 10 MG/1
10 TABLET ORAL DAILY
COMMUNITY

## 2021-02-18 RX ORDER — AMLODIPINE BESYLATE 5 MG/1
10 TABLET ORAL DAILY
Status: DISCONTINUED | OUTPATIENT
Start: 2021-02-18 | End: 2021-02-19

## 2021-02-18 RX ORDER — LOSARTAN POTASSIUM 50 MG/1
100 TABLET ORAL DAILY
Status: DISCONTINUED | OUTPATIENT
Start: 2021-02-18 | End: 2021-02-19

## 2021-02-18 RX ORDER — ATORVASTATIN CALCIUM 20 MG/1
20 TABLET, FILM COATED ORAL DAILY
Status: DISCONTINUED | OUTPATIENT
Start: 2021-02-18 | End: 2021-02-19 | Stop reason: HOSPADM

## 2021-02-18 RX ADMIN — ENOXAPARIN SODIUM 40 MG: 40 INJECTION SUBCUTANEOUS at 15:08

## 2021-02-18 RX ADMIN — AMLODIPINE BESYLATE 10 MG: 5 TABLET ORAL at 13:15

## 2021-02-18 RX ADMIN — CETIRIZINE HYDROCHLORIDE 10 MG: 10 TABLET, FILM COATED ORAL at 13:15

## 2021-02-18 RX ADMIN — Medication 10 ML: at 13:15

## 2021-02-18 RX ADMIN — LOSARTAN POTASSIUM 100 MG: 50 TABLET, FILM COATED ORAL at 13:15

## 2021-02-18 RX ADMIN — ATORVASTATIN CALCIUM 20 MG: 20 TABLET, FILM COATED ORAL at 13:15

## 2021-02-18 RX ADMIN — Medication 10 ML: at 20:24

## 2021-02-18 RX ADMIN — ASPIRIN 325 MG ORAL TABLET 325 MG: 325 PILL ORAL at 13:15

## 2021-02-18 NOTE — ED PROVIDER NOTES
"Subjective   Patient is a 55-year-old male who presents with complaints of intermittent dizziness for the past 4 months but had an episode this morning.  He describes it as a near syncopal episode after he got out of the shower this morning.  Patient states he did make it to the bed before \"passing out for about an hour\" he states once he is lying down he feels like the room is spinning around him.  States he usually feels fatigued/drained afterwards.  Patient reports no symptoms prior to episodes.  Patient states these episodes usually happen in the morning.  Patient states he has seen his PCP last month in regards to the symptoms he states that they did an EKG and referred him to his cardiologist Dr. Springer who he is supposed to see the beginning of March.  He does have a history of SVT status post ablation in 2017.  Patient denies any current dizziness but does report a right-sided headache.  He denies thunderclap or worst take of his life.  He currently rates it a 4/10 severity.  Does report gradual onset.  He denies any sensitivity to light or loud noise one-sided numbness weakness or slurred speech.  Patient reports \"I think my headache is from not having caffeine yet\".  Pertinent negatives include chest pain, heart palpitations, shortness of breath, nausea, vomiting, diarrhea, fever, urinary symptoms, or abdominal pain.  He also denies any recent cough congestion or known sick contacts.  Denies any new medication changes.           Review of Systems   Constitutional: Negative.    Eyes: Negative for photophobia and visual disturbance.   Respiratory: Negative.    Cardiovascular: Negative.    Gastrointestinal: Negative for abdominal distention, abdominal pain, constipation, diarrhea, nausea and vomiting.   Genitourinary: Negative.    Musculoskeletal: Negative for back pain, neck pain and neck stiffness.   Skin: Negative.    Neurological: Positive for dizziness, syncope and headaches. Negative for tremors, " seizures, facial asymmetry, speech difficulty, weakness, light-headedness and numbness.   Hematological: Does not bruise/bleed easily.       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 Abstracted from [facility].   • Depression    • ENT (otolaryngology) follow-up encounter     [facility] Dr. Pederson. Abstracted from [facility].   • FHx: allergies    • GERD (gastroesophageal reflux disease)    • H/O colonoscopy    • History of atrial tachycardia    • History of supraventricular tachycardia     av node reentrant tachycardia s/p ablation   • Hyperlipidemia    • Hypertension    • MRSA (methicillin resistant staph aureus) culture positive     eye   • Ophthalmology follow-up encounter     [facility]. Abstracted from [facility].   • Sleep apnea        Allergies   Allergen Reactions   • Sulfa Antibiotics Nausea Only       Past Surgical History:   Procedure Laterality Date   • CARDIAC ABLATION  05/2017   • CARDIAC CATHETERIZATION  2011   • CORNEAL TRANSPLANT Left    • EYE SURGERY Left    • UMBILICAL HERNIA REPAIR N/A 11/18/2020    Procedure: UMBILICAL HERNIA REPAIR with mesh;  Surgeon: Fredi Kaye MD;  Location: [facility] OR;  Service: General;  Laterality: N/A;       Family History   Problem Relation Age of Onset   • Diabetes Mother    • Glaucoma Mother    • Brain cancer Mother    • Heart disease Mother    • Coronary artery disease Mother    • Hypertension Father    • Depression Father    • Hyperlipidemia Father    • Alzheimer's disease Father    • Glaucoma Sister    • Other Brother         MVA    • Narcolepsy Son    • Heart disease Other    • Congenital heart disease Brother        Social History     Socioeconomic History   • Marital status:      Spouse name: Not on file   • Number of children: 2   • Years of education: Not on file   • Highest education level: Not on file    Occupational History   • Occupation: [employer]      Employer: [employer]   Tobacco Use   • Smoking status: Never Smoker   • Smokeless tobacco: Never Used   Substance and Sexual Activity   • Alcohol use: Yes     Comment: Occasional   • Drug use: No   • Sexual activity: Defer           Objective   Physical Exam  Vitals signs and nursing note reviewed.   Constitutional:       General: He is not in acute distress.     Appearance: He is well-developed. He is not ill-appearing, toxic-appearing or diaphoretic.   HENT:      Head: Normocephalic and atraumatic.      Right Ear: Tympanic membrane, ear canal and external ear normal.      Left Ear: Tympanic membrane, ear canal and external ear normal.      Nose: Nose normal.      Mouth/Throat:      Mouth: Mucous membranes are moist.      Pharynx: Oropharynx is clear.   Eyes:      General: No scleral icterus.     Extraocular Movements: Extraocular movements intact.      Pupils: Pupils are equal, round, and reactive to light.   Neck:      Musculoskeletal: Normal range of motion. No neck rigidity.   Cardiovascular:      Rate and Rhythm: Normal rate and regular rhythm.      Pulses: Normal pulses.      Heart sounds: Normal heart sounds. No murmur. No friction rub. No gallop.    Pulmonary:      Effort: Pulmonary effort is normal. No tachypnea or accessory muscle usage.      Breath sounds: Normal breath sounds. No stridor. No decreased breath sounds, wheezing, rhonchi or rales.   Chest:      Chest wall: No mass, deformity, tenderness or crepitus.   Abdominal:      General: Bowel sounds are normal. There is no distension.      Palpations: Abdomen is soft. There is no splenomegaly or mass.      Tenderness: There is no abdominal tenderness. There is no right CVA tenderness, left CVA tenderness, guarding or rebound.      Hernia: No hernia is present.   Skin:     General: Skin is warm.      Capillary Refill: Capillary refill takes less than 2 seconds.       "Findings: No rash.   Neurological:      General: No focal deficit present.      Mental Status: He is alert and oriented to person, place, and time.      GCS: GCS eye subscore is 4. GCS verbal subscore is 5. GCS motor subscore is 6.      Comments: Normal and equal sensation strength throughout moving all extremities freely.   Psychiatric:         Mood and Affect: Mood normal.         Behavior: Behavior normal.         Procedures           ED Course  ED Course as of Feb 18 1141   Thu Feb 18, 2021   1016 Orthostatics negative    [AA]      ED Course User Index  [AA] Argentina Fletcher PA      /71   Pulse 80   Temp 98 °F (36.7 °C) (Oral)   Resp 18   Ht 177.8 cm (70\")   Wt 95.3 kg (210 lb)   SpO2 95%   BMI 30.13 kg/m²   Medications   sodium chloride 0.9 % flush 10 mL (has no administration in time range)     Labs Reviewed   COMPREHENSIVE METABOLIC PANEL - Abnormal; Notable for the following components:       Result Value    Alkaline Phosphatase 123 (*)     All other components within normal limits    Narrative:     GFR Normal >60  Chronic Kidney Disease <60  Kidney Failure <15     TROPONIN (IN-HOUSE) - Normal    Narrative:     Troponin T Reference Range:  <= 0.03 ng/mL-   Negative for AMI  >0.03 ng/mL-     Abnormal for myocardial necrosis.  Clinicians would have to utilize clinical acumen, EKG, Troponin and serial changes to determine if it is an Acute Myocardial Infarction or myocardial injury due to an underlying chronic condition.       Results may be falsely decreased if patient taking Biotin.     MAGNESIUM - Normal   CBC WITH AUTO DIFFERENTIAL - Normal   POCT GLUCOSE FINGERSTICK - Normal   URINALYSIS W/ MICROSCOPIC IF INDICATED (NO CULTURE)   POCT GLUCOSE FINGERSTICK   CBC AND DIFFERENTIAL    Narrative:     The following orders were created for panel order CBC & Differential.  Procedure                               Abnormality         Status                     ---------                               " -----------         ------                     CBC Auto Differential[[id number]]        Normal              Final result                 Please view results for these tests on the individual orders.   EXTRA TUBES    Narrative:     The following orders were created for panel order Extra Tubes.  Procedure                               Abnormality         Status                     ---------                               -----------         ------                     Light Blue Top[[id number]]                                   Final result               Gold Top - SST[[id number]]                                   Final result                 Please view results for these tests on the individual orders.   LIGHT BLUE TOP   GOLD TOP - SST     Ct Head Without Contrast    Result Date: 2/18/2021  No acute intracranial finding.  Electronically Signed By-Eugenie Gillette MD On:2/18/2021 11:13 AM This report was finalized on [id number] by  Eugenie Gillette MD.    Xr Chest 1 View    Result Date: 2/18/2021  No acute cardiopulmonary findings.  Electronically Signed By-Sixto Bryant MD On:2/18/2021 10:54 AM This report was finalized on [id number] by  Sixto Bryant MD.                                         MDM  Number of Diagnoses or Management Options  Syncope, unspecified syncope type:   Diagnosis management comments: Chart Review:  Comorbidity: As per past medical history  Differentials: Tumor, CVA, electrolyte abnormality, ACS     ;this list is not all inclusive and does not constitute the entirety of considered causes  ECG: Interpreted by myself and Dr. Salinas shows sinus rhythm rate 84 no ST elevation or other abnormalities previous EKG was reviewed from 11/12/2020  Labs: POC glucose 100 CBC unremarkable troponin within normal limits magnesium 2.3 POC glucose 100 CMP significant for an alk phos of 123 otherwise unremarkable as above.  Imaging: Was interpreted by physician and reviewed by myself:  Ct Head Without  Contrast  Result Date: 2/18/2021  No acute intracranial finding.  Electronically Signed By-Eugenie Gillette MD On:2/18/2021 11:13 AM This report was finalized on [id number] by  Eugenie Gillette MD.    Xr Chest 1 View  Result Date: 2/18/2021  No acute cardiopulmonary findings.  Electronically Signed By-Sixto Bryant MD On:2/18/2021 10:54 AM This report was finalized on [id number] by  Sixto Bryant MD.    Disposition/Treatment:  Appropriate PPE was worn during exam and throughout all encounters with the patient.  While in the ED patient was afebrile.  Nontoxic showed no acute cranial focal neural deficits.  Patient declined any pain medicine for his headache.  Orthostatics were negative.  There are no signs of severe infection or electrolyte abnormality.  Troponin was within normal limits.  Chest x-ray showed no acute cardiopulmonary abnormalities CT of head was negative for any acute abnormalities as well.  Patient has had multiple near syncopal episodes over the past several weeks he will be admitted for further evaluation and management.  Lab results and findings were discussed with the patient was understand admission was in agreement with plan.  Patient will be admitted to hospitalist group.  Spoke to Francesca NP who agreed for admission through Dr. Jeffries.  Patient remained stable throughout ED stay       Amount and/or Complexity of Data Reviewed  Clinical lab tests: reviewed  Tests in the radiology section of CPT®: reviewed  Tests in the medicine section of CPT®: reviewed        Final diagnoses:   Syncope, unspecified syncope type            Argentina Fletcher PA  02/18/21 [id number]

## 2021-02-18 NOTE — PLAN OF CARE
"  Problem: Adult Inpatient Plan of Care  Goal: Plan of Care Review  Outcome: Ongoing, Progressing  Flowsheets  Taken 2/18/2021 1608  Plan of Care Reviewed With: patient  Outcome Summary: Pt is 54 yo male admitted after syncopal episode with dizziness.  CT head (-) acute changes.  Orthostatic testing completed with no signs of hypotension noted.  Pt indep with bed mobility, transfers, ambulation.  Pt with no signs of LOB or safety concerns noted.  Pt can be up ad sarah.  Pt reports feeling \"normal\" now.  Plan home.  PPE donned: mask, goggles, gloves.    "

## 2021-02-18 NOTE — H&P
[facility]      Patient Name: Choco Bryant  : 1965  MRN: [id number]  Primary Care Physician: Jacinta Penn APRN  Date of admission: 2021    Patient Care Team:  Jacinta Penn APRN as PCP - General (Nurse Practitioner)          Subjective   History Present Illness     Chief Complaint:   Chief Complaint   Patient presents with   • Dizziness     dizziness for 4 months, pt states this morning he had 3 spells but was able to get to the bed before passing out.       Mr. Bryant is a 55 y.o. male with a history of HTN, SVT s/p ablation, anxiety, depression, ADD, insomnia, and sleep apnea who presents to [facility] ED on 2021 complaining of dizziness and passing out. The patient states over the past few months he has had several episodes of becoming dizzy and flushed, then passing out. He states when he passes out he is out for several hours. He states his wife witnessed one of his episodes and denies any seizure-like activity. He states he saw his PCP recently and had an EKG done. He states he has an upcoming appointment with Dr. Springer on 21. He denies any chest pain or shortness of breath. He denies any exacerbating or alleviating factors. He states when he passed out today he was out for 1 hour. He states he came to the ER for further evaluation.     The patient denies tobacco, alcohol or illicit drug use. He reports a family history of heart disease.     Upon arrival to the ER the patient's orthostatic vital signs were unremarkable. His troponin was normal and his EKG showed sinus rhythm. His CBC and CMP were unremarkable. His magnesium was within normal limits. His CT head showed no acute findings. His CXR showed no acute findings. He was admitted for observation and further evaluation.          Review of Systems   Constitution: Positive for diaphoresis.   Cardiovascular: Positive for syncope. Negative for chest pain.    Respiratory: Negative for shortness of breath.    Neurological: Positive for dizziness.   All other systems reviewed and are negative.        Personal History     Past Medical History:   Past Medical History:   Diagnosis Date   • ADHD (attention deficit hyperactivity disorder)    • Anxiety    • AV mark tachycardia (CMS/HCC) 05/2017    Reentrant ablation. Abstracted from [facility].   • Depression    • GERD (gastroesophageal reflux disease)    • History of atrial tachycardia    • History of supraventricular tachycardia     av node reentrant tachycardia s/p ablation   • Hyperlipidemia    • Hypertension    • MRSA (methicillin resistant staph aureus) culture positive     eye   • Sleep apnea        Surgical History:      Past Surgical History:   Procedure Laterality Date   • CARDIAC ABLATION  05/2017   • CARDIAC CATHETERIZATION  2011   • COLONOSCOPY     • CORNEAL TRANSPLANT Left    • EYE SURGERY Left    • UMBILICAL HERNIA REPAIR N/A 11/18/2020    Procedure: UMBILICAL HERNIA REPAIR with mesh;  Surgeon: Fredi Kaye MD;  Location: [facility];  Service: General;  Laterality: N/A;       Family History: family history includes Alzheimer's disease in his father; Brain cancer in his mother; Congenital heart disease in his brother; Coronary artery disease in his mother; Depression in his father; Diabetes in his mother; Glaucoma in his mother and sister; Heart disease in his mother and another family member; Hyperlipidemia in his father; Hypertension in his father; Narcolepsy in his son; Other in his brother. Otherwise pertinent FHx was reviewed and unremarkable.     Social History:  reports that he has never smoked. He has never used smokeless tobacco. He reports current alcohol use. He reports that he does not use drugs.      Medications:  Prior to Admission medications    Medication Sig Start Date End Date Taking? Authorizing Provider   amLODIPine (NORVASC) 10 MG tablet Take 1 tablet by mouth Daily. 11/9/20  Yes  Lynnette Tomlinson MD   amphetamine-dextroamphetamine XR (Adderall XR) 30 MG 24 hr capsule Take 1 capsule by mouth Every Morning 1/21/21  Yes Jacinta Penn APRN   atorvastatin (LIPITOR) 20 MG tablet Take 1 tablet by mouth Daily. 11/17/20  Yes Lynnette Tomlinson MD   clonazePAM (KlonoPIN) 2 MG tablet TAKE 1 TABLET BY MOUTH AT NIGHT AS NEEDED FOR ANXIETY 1/9/21  Yes Geetha Dyer MD   diphenhydrAMINE (BENADRYL) 25 MG tablet Take 25 mg by mouth At Night As Needed for Allergies.   Yes ProviderRica MD   FLONASE ALLERGY RELIEF 50 MCG/ACT nasal spray 2 sprays into the nostril(s) as directed by provider Daily As Needed. 4/11/19  Yes Rica Barbour MD   fluorometholone (FML) 0.1 % ophthalmic suspension Administer 1 drop into the left eye 2 (Two) Times a Day. 10/30/20  Yes Rica Barbour MD   HYDROcodone-acetaminophen (NORCO) 5-325 MG per tablet Take 1 tablet by mouth Every 4 (Four) Hours As Needed for Mild Pain  or Moderate Pain .   Yes ProviderRica MD   loratadine (CLARITIN) 10 MG tablet Take 10 mg by mouth Daily.   Yes ProviderRica MD   losartan (COZAAR) 100 MG tablet Take 1 tablet by mouth Daily. 11/9/20  Yes Lynnette Tomlinson MD   sildenafil (REVATIO) 20 MG tablet Take 1-5 tablets by mouth Daily As Needed (intercourse). 6/30/20  Yes Lynnette Tomlinson MD   tamsulosin (FLOMAX) 0.4 MG capsule 24 hr capsule Take 1 capsule by mouth Daily. 11/9/20  Yes Lynnette Tomlinson MD   Vortioxetine HBr (TRINTELLIX) 10 MG tablet Take 10 mg by mouth Daily. 10/25/19  Yes Lynnette Tomlinson MD   diphenhydrAMINE-acetaminophen (TYLENOL PM)  MG tablet per tablet Take 1 tablet by mouth Daily.  2/18/21 Yes Rica Barbour MD   Omeprazole ([employer] OMEPRAZOLE) 20 MG tablet delayed-release Take 20 mg by mouth Daily. 11/17/15 2/18/21 Yes Rica Barbour MD   vitamin E 200 UNIT capsule Take 600 Units by mouth Daily.  2/18/21 Yes Provider, MD Rica   albuterol  sulfate HFA (PROAIR HFA) 108 (90 Base) MCG/ACT inhaler INL 2 PFS PO Q 4 H FOR UP TO 3 DAYS PRF WHZ OR SOB 12/30/18 2/18/21  Provider, Rica, MD       Allergies:    Allergies   Allergen Reactions   • Sulfa Antibiotics Nausea Only       Objective   Objective     Vital Signs  Temp:  [98 °F (36.7 °C)-98.5 °F (36.9 °C)] 98.5 °F (36.9 °C)  Heart Rate:  [78-92] 78  Resp:  [17-18] 17  BP: (105-135)/(67-86) 135/86  SpO2:  [95 %-99 %] 98 %  on   ;   Device (Oxygen Therapy): room air  Body mass index is 29.2 kg/m².    Physical Exam  Vitals signs reviewed.   Constitutional:       Appearance: He is overweight.   HENT:      Head: Normocephalic.      Nose: Nose normal.      Mouth/Throat:      Mouth: Mucous membranes are moist.   Eyes:      Extraocular Movements: Extraocular movements intact.      Conjunctiva/sclera: Conjunctivae normal.      Pupils: Pupils are equal, round, and reactive to light.   Neck:      Musculoskeletal: Normal range of motion and neck supple.   Cardiovascular:      Rate and Rhythm: Normal rate and regular rhythm.      Pulses: Normal pulses.   Pulmonary:      Effort: Pulmonary effort is normal.      Breath sounds: Normal breath sounds.   Abdominal:      General: Bowel sounds are normal. There is no distension.      Palpations: Abdomen is soft.      Tenderness: There is no abdominal tenderness.   Musculoskeletal: Normal range of motion.      Right lower leg: No edema.      Left lower leg: No edema.   Skin:     General: Skin is warm and dry.      Capillary Refill: Capillary refill takes less than 2 seconds.   Neurological:      General: No focal deficit present.      Mental Status: He is alert and oriented to person, place, and time.   Psychiatric:         Mood and Affect: Mood is anxious.         Results Review:  I have personally reviewed most recent cardiac tracings, lab results, microbiology results and radiology images and interpretations and agree with findings.    Results from last 7 days   Lab  Units 02/18/21  1021   WBC 10*3/mm3 6.20   HEMOGLOBIN g/dL 14.7   HEMATOCRIT % 43.0   PLATELETS 10*3/mm3 281     Results from last 7 days   Lab Units 02/18/21  1021   SODIUM mmol/L 139   POTASSIUM mmol/L 4.1   CHLORIDE mmol/L 104   CO2 mmol/L 26.0   BUN mg/dL 14   CREATININE mg/dL 1.09   GLUCOSE mg/dL 96   CALCIUM mg/dL 9.3   ALT (SGPT) U/L 12   AST (SGOT) U/L 16   TROPONIN T ng/mL <0.010     Estimated Creatinine Clearance: 87.4 mL/min (by C-G formula based on SCr of 1.09 mg/dL).  Brief Urine Lab Results     None          Microbiology Results (last 10 days)     ** No results found for the last 240 hours. **          ECG/EMG Results (most recent)     Procedure Component Value Units Date/Time    ECG 12 Lead [[id number]] Collected: 02/18/21 0943     Updated: 02/18/21 1246     QT Interval 376 ms     Narrative:      HEART RATE= 84  bpm  RR Interval= 716  ms  OR Interval= 167  ms  P Horizontal Axis= 0  deg  P Front Axis= 36  deg  QRSD Interval= 94  ms  QT Interval= 376  ms  QRS Axis= 40  deg  T Wave Axis= 52  deg  - NORMAL ECG -  Sinus rhythm  When compared with ECG of 12-Nov-2020 6:52:33,  No significant change  Electronically Signed By: Manish Salinas ([facility]) 18-Feb-2021 12:44:49  Date and Time of Study: 2021-02-18 09:43:46          Ct Head Without Contrast    Result Date: 2/18/2021  No acute intracranial finding.  Electronically Signed By-Eugenie Gillette MD On:2/18/2021 11:13 AM This report was finalized on [id number] by  Eugenie Gillette MD.    Xr Chest 1 View    Result Date: 2/18/2021  No acute cardiopulmonary findings.  Electronically Signed By-Sixto Bryant MD On:2/18/2021 10:54 AM This report was finalized on [id number] by  Sixto Bryant MD.      Estimated Creatinine Clearance: 87.4 mL/min (by C-G formula based on SCr of 1.09 mg/dL).      Assessment/Plan   Assessment/Plan       Active Hospital Problems    Diagnosis  POA   • **Syncope [R55]  Yes   • Overweight (BMI 25.0-29.9) [E66.3]  Yes   • Anxiety [F41.9]  Yes    • LAQUITA (obstructive sleep apnea) [G47.33]  Yes   • Depression [F32.9]  Yes   • Attention deficit disorder (ADD) without hyperactivity [F98.8]  Yes   • Insomnia [G47.00]  Yes   • History of supraventricular tachycardia [Z86.79]  Not Applicable   • Essential hypertension [I10]  Yes   • Dyslipidemia [E78.5]  Yes      Resolved Hospital Problems   No resolved problems to display.       Syncope  -etiology unclear  -orthostatic vital signs unremarkable  -check serial troponin  -EKG:  SR  -obtain 2D echo  -check bilateral carotid duplex  -continuous cardiac monitoring   -fall precautions     Attention deficit disorder (ADD) without hyperactivity  Depression  Insomnia  Anxiety  -hold Adderall  -continue clonazepam (INSPECT reviewed)    Dyslipidemia  -continue statin     Essential hypertension, chronic, controlled  -continue amlodipine and losartan   -monitor BP    History of supraventricular tachycardia, h/o ablation     LAQUITA (obstructive sleep apnea)    Overweight (BMI 25.0-29.9)  -BMI 29.20 on admission  -encourage lifestyle modifications           VTE Prophylaxis -   Mechanical Order History:     None      Pharmalogical Order History:      Ordered     Dose Route Frequency Stop    02/18/21 1245  enoxaparin (LOVENOX) syringe 40 mg      40 mg SC Every 24 Hours --                CODE STATUS:    Code Status and Medical Interventions:   Ordered at: 02/18/21 1245     Code Status:    CPR     Medical Interventions (Level of Support Prior to Arrest):    Full       This patient has been examined wearing appropriate Personal Protective Equipment. 02/18/21      I discussed the patient's findings and my recommendations with patient.      Signature:  Electronically signed by PAVITHRA Dale, 02/18/21, 1:29 PM EST.  [facility] Hospitalist Team          Agree with above mentioned except my evaluation, assessment, plan and treatment supercedes that of ARNP or PA.        Electronically signed by Mejia Jeffries MD, 02/18/21, 7:02 PM  EST.          Mr. Bryant is a 55 y.o. male with a history of HTN, SVT s/p ablation, anxiety, depression, ADD, insomnia, and sleep apnea who presents to [facility] ED on 02/18/2021 complaining of dizziness and passing out. The patient states over the past few months he has had several episodes of becoming dizzy and flushed, then passing out. He states when he passes out he is out for several hours. He states his wife witnessed one of his episodes and denies any seizure-like activity. He states he saw his PCP recently and had an EKG done. He states he has an upcoming appointment with Dr. Springer on 03/04/21. He denies any chest pain or shortness of breath. He denies any exacerbating or alleviating factors. He states when he passed out today he was out for 1 hour. He states he came to the ER for further evaluation.      The patient denies tobacco, alcohol or illicit drug use. He reports a family history of heart disease.      Upon arrival to the ER the patient's orthostatic vital signs were unremarkable. His troponin was normal and his EKG showed sinus rhythm. His CBC and CMP were unremarkable. His magnesium was within normal limits. His CT head showed no acute findings. His CXR showed no acute findings. He was admitted for observation and further evaluation.       Patient gives history of prolonged passing out episodes.  Some shorter than others.  He states he is exhausted and very sleepy after that.  No bodily injuries each time.    ROS:  Constitution: Positive for diaphoresis.   Cardiovascular: Positive for syncope. Negative for chest pain.   Respiratory: Negative for shortness of breath.    Neurological: Positive for dizziness.   All other systems reviewed and are negative.  Noted      Physical Exam   Constitutional: Patient appears well-developed and well-nourished and in no acute distress     HEENT:   Head: Normocephalic and atraumatic.   Eyes:  Pupils are equal, round, and reactive to light. EOM are  intact. Sclera are anicteric and non-injected.  Mouth and Throat: Patient has moist mucous membranes. Oropharynx is clear of any erythema or exudate.       Neck: Neck supple.  No thyromegaly present. No lymphadenopathy present. No  masses.     Cardiovascular: Inspection: No JVD present. Palpation: No parasternal heave. Pedal pulses +1 bilaterally. No leg edema. Auscultation: Regular rate, regular rhythm, S1 normal and S2 normal. reveals no gallop and no friction rub. No Carotid bruit bilaterally.    Pulmonary/Chest: Inspection: No distress, no use of accessory muscles. Lungs are clear to auscultation bilaterally. No respiratory distress. No wheezes. No rales.     Abdomen /Gastrointestinal: Inspection: no distension. Palpation: no masses, no organomegaly. Soft. There is no tenderness. Bowel sounds are normal.     Musculoskeletal: Normal Muscle tone. Age appropriate, no deformities.    Neurological: Patient is alert and oriented to person, place, and time. Cranial nerves II-XII are grossly intact with no focal deficits. Sensori-motor exam is normal. No cerebellar signs.    Skin: Skin is warm. No rash noted. Nails show no clubbing.  No cyanosis or erythema. No bruising.    Emotional Behavior:   Appropriate   Debilities:  Age appropriate    Active Hospital Problems    Diagnosis  POA   • **Syncope [R55]  Yes     Priority: High   • Overweight (BMI 25.0-29.9) [E66.3]  Yes   • LAQUITA (obstructive sleep apnea) [G47.33]  Yes   • Attention deficit disorder (ADD) without hyperactivity [F98.8]  Yes   • History of supraventricular tachycardia [Z86.79]  Not Applicable   • Essential hypertension [I10]  Yes   • Dyslipidemia [E78.5]  Yes      Resolved Hospital Problems   No resolved problems to display.       Syncope:  IV fluids  Supportive treatment  Review antihypertensives  Ultrasound carotids  2D echocardiogram  Cardiac monitor if needed  Await cardiology input  Rule out seizure disorder.  Await neurology input  Event monitor versus  loop recorder    Hypertension:  Continue home medications   Options include-  beta-blockers  Calcium channel blockers  ACE inhibitor  Vasodilators  Low-dose diuretics  PRN medications have not been shown to affect outcomes-to be avoided        Hyperlipidemia:  Check lipid panel  Statins if indicated  Add Ezetimibe if appropriate  No role for omega-3 demonstrated.

## 2021-02-18 NOTE — THERAPY EVALUATION
Patient Name: Choco Bryant  : 1965    MRN: [id number]                              Today's Date: 2021       Admit Date: 2021    Visit Dx:     ICD-10-CM ICD-9-CM   1. Syncope, unspecified syncope type  R55 780.2     Patient Active Problem List   Diagnosis   • Attention deficit disorder (ADD) without hyperactivity   • Benign prostatic hyperplasia   • Cervical radiculopathy   • Depression   • Dyslipidemia   • Encounter for general adult medical examination without abnormal findings   • Fatigue   • Essential hypertension   • Insomnia   • Low back pain   • Other specified disorder of penis   • Peripheral neuropathy   • History of supraventricular tachycardia   • LAQUITA (obstructive sleep apnea)   • Syncope   • Anxiety   • Overweight (BMI 25.0-29.9)     Past Medical History:   Diagnosis Date   • ADHD (attention deficit hyperactivity disorder)    • Anxiety    • AV mark tachycardia (CMS/HCC) 2017    Reentrant ablation. Abstracted from [facility].   • Depression    • GERD (gastroesophageal reflux disease)    • History of atrial tachycardia    • History of supraventricular tachycardia     av node reentrant tachycardia s/p ablation   • Hyperlipidemia    • Hypertension    • MRSA (methicillin resistant staph aureus) culture positive     eye   • Sleep apnea      Past Surgical History:   Procedure Laterality Date   • CARDIAC ABLATION  2017   • CARDIAC CATHETERIZATION     • COLONOSCOPY     • CORNEAL TRANSPLANT Left    • EYE SURGERY Left    • UMBILICAL HERNIA REPAIR N/A 2020    Procedure: UMBILICAL HERNIA REPAIR with mesh;  Surgeon: Fredi Kaye MD;  [facility]  Service: General;  Laterality: N/A;     General Information     Row Name 21 1606          Physical Therapy Time and Intention    Document Type  evaluation  -HC     Mode of Treatment  physical therapy  -HC     Row Name 21 1606          General Information    Patient Profile Reviewed  yes  -HC     Prior Level of  Function  independent:  -     Row Name 02/18/21 1606          Living Environment    Lives With  spouse;child(sp), adult  -     Row Name 02/18/21 1606          Home Main Entrance    Number of Stairs, Main Entrance  two  -     Row Name 02/18/21 1606          Stairs Within Home, Primary    Number of Stairs, Within Home, Primary  ten to bedroom  -     Row Name 02/18/21 1606          Cognition    Orientation Status (Cognition)  oriented x 4  -       User Key  (r) = Recorded By, (t) = Taken By, (c) = Cosigned By    Initials Name Provider Type    Anahi Cho, PT Physical Therapist        Mobility     Row Name 02/18/21 1606          Bed Mobility    Bed Mobility  supine-sit;sit-supine  -     Supine-Sit [place] (Bed Mobility)  independent  -     Sit-Supine [place] (Bed Mobility)  independent  -     Row Name 02/18/21 1606          Sit-Stand Transfer    Sit-Stand [place] (Transfers)  independent  -     Row Name 02/18/21 1606          Gait/Stairs (Locomotion)    [place] Level (Gait)  independent  -     Distance in Feet (Gait)  250 ft  -     Comment (Gait/Stairs)  no signs of LOB or safety concerns noted with functional mobility  -       User Key  (r) = Recorded By, (t) = Taken By, (c) = Cosigned By    Initials Name Provider Type    Anahi Cho, PT Physical Therapist        Obj/Interventions     Row Name 02/18/21 1607          Range of Motion Comprehensive    General Range of Motion  no range of motion deficits identified  -[facility] Name 02/18/21 1607          Strength Comprehensive (MMT)    General Manual Muscle Testing (MMT) Assessment  no strength deficits identified  -     Row Name 02/18/21 1607          Balance    Balance Assessment  standing static balance;standing dynamic balance  -     Static Standing Balance  WFL  -     Dynamic Standing Balance  WFL  -     Row Name 02/18/21 1607          Sensory Assessment (Somatosensory)    Sensory Assessment  (Somatosensory)  sensation intact  -       User Key  (r) = Recorded By, (t) = Taken By, (c) = Cosigned By    Initials Name Provider Type     Anahi Hardin, PT Physical Therapist        Goals/Plan    No documentation.       Clinical Impression     Row Name 02/18/21 1607          Pain    Additional Documentation  Pain Scale: Numbers Pre/Post-Treatment (Group)  -[facility] Name 02/18/21 1607          Pain Scale: Numbers Pre/Post-Treatment    Pretreatment Pain Rating  0/10 - no pain  -     Posttreatment Pain Rating  0/10 - no pain  -[facility] Name 02/18/21 1607          Plan of Care Review    Outcome Summary  Pt is 56 yo male admitted after syncopal episode with dizziness.  CT head (-) acute changes.  -     Row Name 02/18/21 1607          Therapy Assessment/Plan (PT)    Patient/Family Therapy Goals Statement (PT)  return home  -     Criteria for Skilled Interventions Met (PT)  no problems identified which require skilled intervention  -     Row Name 02/18/21 1607          Vital Signs    Pre Systolic BP Rehab  136  -HC     Pre Treatment Diastolic BP  93  -HC     Intra Systolic BP Rehab  128  -HC     Intra Treatment Diastolic BP  81  -HC     Post Systolic BP Rehab  130  -HC     Post Treatment Diastolic BP  77  -HC     Intratreatment Heart Rate (beats/min)  70  -HC     Intra SpO2 (%)  99  -HC     O2 Delivery Intra Treatment  room air  -HC     Pre Patient Position  Supine  -HC     Intra Patient Position  Sitting  -HC     Post Patient Position  Standing  -[facility] Name 02/18/21 1607          Positioning and Restraints    Pre-Treatment Position  in bed  -     Post Treatment Position  bed  -HC     In Bed  notified nsg;call light within reach  -       User Key  (r) = Recorded By, (t) = Taken By, (c) = Cosigned By    Initials Name Provider Type     Anahi Hardin, PT Physical Therapist        Outcome Measures     Row Name 02/18/21 1608          Modified Hettinger Scale    Modified Hettinger Scale  0 - No  "Symptoms at all.  -     Row Name 02/18/21 1608          Functional Assessment    Outcome Measure Options  Modified Sofi  -       User Key  (r) = Recorded By, (t) = Taken By, (c) = Cosigned By    Initials Name Provider Type     Anahi Hardin, PT Physical Therapist        Physical Therapy Education                 Title: PT OT SLP Therapies (Done)     Topic: Physical Therapy (Done)     Point: Mobility training (Done)     Learning Progress Summary           Patient Acceptance, E, VU by  at 2/18/2021 1608                   Point: Precautions (Done)     Learning Progress Summary           Patient Acceptance, E, VU by  at 2/18/2021 1608                               User Key     Initials Effective Dates Name Provider Type [facility] 04/17/20 -  Anahi Hardin, PT Physical Therapist PT              PT Recommendation and Plan     Plan of Care Reviewed With: patient  Outcome Summary: Pt is 54 yo male admitted after syncopal episode with dizziness.  CT head (-) acute changes.  Orthostatic testing completed with no signs of hypotension noted.  Pt indep with bed mobility, transfers, ambulation.  Pt with no signs of LOB or safety concerns noted.  Pt can be up ad sarah.  Pt reports feeling \"normal\" now.  Plan home.  PPE donned: mask, goggles, gloves.     Time Calculation:   PT Charges     Row Name 02/18/21 1610             Time Calculation    Start Time  1431  -      Stop Time  1447  -      Time Calculation (min)  16 min  -      PT Received On  02/18/21  -         Time Calculation- PT    Total Timed Code Minutes- PT  0 minute(s)  -        User Key  (r) = Recorded By, (t) = Taken By, (c) = Cosigned By    Initials Name Provider Type     Anahi Hardin, PT Physical Therapist        Therapy Charges for Today     Code Description Service Date Service Provider Modifiers Qty    [id number]  PT EVAL LOW COMPLEXITY 3 2/18/2021 Anahi Hardin, PT GP 1          PT G-Codes  Outcome Measure Options: " Modified Sofi  Modified [place] Scale: 0 - No Symptoms at all.    Anahi Hardin, PT  2/18/2021

## 2021-02-18 NOTE — ED NOTES
Patient presents to the ED today due to dizziness. States he had a syncopal episode this morning. Patients PCP has sent him to a heart doctor, he has an appointment in march to see him.Patients states he has been having this episodes for 4 months.     Mayi Moran RN  02/18/21 0943

## 2021-02-19 ENCOUNTER — READMISSION MANAGEMENT (OUTPATIENT)
Dept: CALL CENTER | Facility: HOSPITAL | Age: 56
End: 2021-02-19

## 2021-02-19 VITALS
BODY MASS INDEX: 29.98 KG/M2 | SYSTOLIC BLOOD PRESSURE: 128 MMHG | TEMPERATURE: 98.1 F | HEIGHT: 70 IN | WEIGHT: 209.44 LBS | HEART RATE: 79 BPM | DIASTOLIC BLOOD PRESSURE: 76 MMHG | OXYGEN SATURATION: 98 % | RESPIRATION RATE: 16 BRPM

## 2021-02-19 LAB
ANION GAP SERPL CALCULATED.3IONS-SCNC: 8 MMOL/L (ref 5–15)
BASOPHILS # BLD AUTO: 0.1 10*3/MM3 (ref 0–0.2)
BASOPHILS NFR BLD AUTO: 1 % (ref 0–1.5)
BUN SERPL-MCNC: 18 MG/DL (ref 6–20)
BUN/CREAT SERPL: 18 (ref 7–25)
CALCIUM SPEC-SCNC: 8.8 MG/DL (ref 8.6–10.5)
CHLORIDE SERPL-SCNC: 107 MMOL/L (ref 98–107)
CO2 SERPL-SCNC: 27 MMOL/L (ref 22–29)
CREAT SERPL-MCNC: 1 MG/DL (ref 0.76–1.27)
DEPRECATED RDW RBC AUTO: 41.6 FL (ref 37–54)
EOSINOPHIL # BLD AUTO: 0.1 10*3/MM3 (ref 0–0.4)
EOSINOPHIL NFR BLD AUTO: 1.8 % (ref 0.3–6.2)
ERYTHROCYTE [DISTWIDTH] IN BLOOD BY AUTOMATED COUNT: 13.7 % (ref 12.3–15.4)
GFR SERPL CREATININE-BSD FRML MDRD: 78 ML/MIN/1.73
GLUCOSE SERPL-MCNC: 122 MG/DL (ref 65–99)
HCT VFR BLD AUTO: 40.4 % (ref 37.5–51)
HGB BLD-MCNC: 13.6 G/DL (ref 13–17.7)
LYMPHOCYTES # BLD AUTO: 2.3 10*3/MM3 (ref 0.7–3.1)
LYMPHOCYTES NFR BLD AUTO: 36.8 % (ref 19.6–45.3)
MAGNESIUM SERPL-MCNC: 2.3 MG/DL (ref 1.6–2.6)
MCH RBC QN AUTO: 29.6 PG (ref 26.6–33)
MCHC RBC AUTO-ENTMCNC: 33.6 G/DL (ref 31.5–35.7)
MCV RBC AUTO: 87.8 FL (ref 79–97)
MONOCYTES # BLD AUTO: 0.6 10*3/MM3 (ref 0.1–0.9)
MONOCYTES NFR BLD AUTO: 9.5 % (ref 5–12)
NEUTROPHILS NFR BLD AUTO: 3.1 10*3/MM3 (ref 1.7–7)
NEUTROPHILS NFR BLD AUTO: 50.9 % (ref 42.7–76)
NRBC BLD AUTO-RTO: 0.2 /100 WBC (ref 0–0.2)
PLATELET # BLD AUTO: 250 10*3/MM3 (ref 140–450)
PMV BLD AUTO: 8.5 FL (ref 6–12)
POTASSIUM SERPL-SCNC: 4.1 MMOL/L (ref 3.5–5.2)
RBC # BLD AUTO: 4.6 10*6/MM3 (ref 4.14–5.8)
SODIUM SERPL-SCNC: 142 MMOL/L (ref 136–145)
WBC # BLD AUTO: 6.2 10*3/MM3 (ref 3.4–10.8)

## 2021-02-19 PROCEDURE — 99217 PR OBSERVATION CARE DISCHARGE MANAGEMENT: CPT | Performed by: INTERNAL MEDICINE

## 2021-02-19 PROCEDURE — G0378 HOSPITAL OBSERVATION PER HR: HCPCS

## 2021-02-19 PROCEDURE — 85025 COMPLETE CBC W/AUTO DIFF WBC: CPT | Performed by: NURSE PRACTITIONER

## 2021-02-19 PROCEDURE — 99214 OFFICE O/P EST MOD 30 MIN: CPT | Performed by: PSYCHIATRY & NEUROLOGY

## 2021-02-19 PROCEDURE — 99214 OFFICE O/P EST MOD 30 MIN: CPT | Performed by: INTERNAL MEDICINE

## 2021-02-19 PROCEDURE — 80048 BASIC METABOLIC PNL TOTAL CA: CPT | Performed by: NURSE PRACTITIONER

## 2021-02-19 PROCEDURE — 83735 ASSAY OF MAGNESIUM: CPT | Performed by: NURSE PRACTITIONER

## 2021-02-19 RX ORDER — AMLODIPINE BESYLATE 5 MG/1
5 TABLET ORAL DAILY
Status: DISCONTINUED | OUTPATIENT
Start: 2021-02-20 | End: 2021-02-19 | Stop reason: HOSPADM

## 2021-02-19 RX ADMIN — ATORVASTATIN CALCIUM 20 MG: 20 TABLET, FILM COATED ORAL at 09:09

## 2021-02-19 RX ADMIN — LOSARTAN POTASSIUM 100 MG: 50 TABLET, FILM COATED ORAL at 09:09

## 2021-02-19 RX ADMIN — ASPIRIN 325 MG ORAL TABLET 325 MG: 325 PILL ORAL at 09:09

## 2021-02-19 RX ADMIN — CETIRIZINE HYDROCHLORIDE 10 MG: 10 TABLET, FILM COATED ORAL at 09:09

## 2021-02-19 RX ADMIN — AMLODIPINE BESYLATE 10 MG: 5 TABLET ORAL at 09:09

## 2021-02-19 NOTE — CONSULTS
Primary Care Provider: Jacinta Penn APRN     Consult requested by: Dr Jeffries  Reason for Consultation: Neurological evaluation/Syncope  History taken from: patient chart RN    Chief complaint: Syncope     SUBJECTIVE:    History of present illness:   The patient is a very pleasant 55-year-old right-handed white male who was evaluated in room 105 in observation at [facility].  Social information is the patient and the medical records    He was admitted for recurrent syncopal episodes.  It is a very interesting history because he reports that over the last 4 months, intermittently he has had multiple syncopal episodes.  He has a typical history and presentation, as all of the episodes happen in the morning when he is up and about.  He says that he has not noticed to get up slowly, he would sit at bedside for a few moments and then stand up for a few minutes before he moves.  The first episode was about 4 months ago when he was in the bathroom taking a shower and he collapsed and essentially fell out of the bathtub but was able to get up later on in went to sleep and he said he slept for several hours afterwards.  The second episode was something similar in presentation he he got up and was going to the bathroom and he said that he knew that something was going to happen as he gets dizzy, and the dizziness is explained as feeling of unsteadiness and about to faint, and he knelt down and then got up and may have collapsed again.  His wife witnessed him and they report brief episode but afterwards he lays down on the bed and sleeps.  Third episode was similar and fourth episode was he knelt down and everted it but then he got up and he collapsed again.  There is never loss of bowel or bladder control, no tongue bites, no witnessed seizures.    He has no other episodes of loss of awareness, time lapses or seizures or unexplained injuries.  He reports no change in medication lately, though he reports that  he lost, I believe 50+ pounds in 8 months and has regained about 8 pounds now but beyond that nothing else has changed    He does not smoke drink or use drugs  No history of head injury or trauma or exposure to toxins  His head CT was okay, his carotids were okay  His basic labs are okay, no electrolyte abnormalities and no anemia    His vital signs are strange, he had had blood pressure as low as 84 systolic and 60 diastolic  He said that he had erratic blood pressure in the past and he struggled with maintaining low blood pressure    His TSH was okay and he is not diabetic    As per admitting, Mr. Bryant is a 55 y.o. male with a history of HTN, SVT s/p ablation, anxiety, depression, ADD, insomnia, and sleep apnea who presents to [facility] ED on 02/18/2021 complaining of dizziness and passing out. The patient states over the past few months he has had several episodes of becoming dizzy and flushed, then passing out. He states when he passes out he is out for several hours. He states his wife witnessed one of his episodes and denies any seizure-like activity. He states he saw his PCP recently and had an EKG done. He states he has an upcoming appointment with Dr. Springer on 03/04/21. He denies any chest pain or shortness of breath. He denies any exacerbating or alleviating factors. He states when he passed out today he was out for 1 hour. He states he came to the ER for further evaluation.      The patient denies tobacco, alcohol or illicit drug use. He reports a family history of heart disease.      Upon arrival to the ER the patient's orthostatic vital signs were unremarkable. His troponin was normal and his EKG showed sinus rhythm. His CBC and CMP were unremarkable. His magnesium was within normal limits. His CT head showed no acute findings. His CXR showed no acute findings. He was admitted for observation and further evaluation.        Review of Systems   No fever chills rigors or sweats  No weight  issues  No sleep problems  HEENT:  No speech problem, vision changes, facial asymmetry or pain, or neck problem  Chest: No chest pain, clubbing, cyanosis, orthopnea palpitations  Pulmonary:  No shortness of air, cough or expectoration  Abdomen:  No swelling/tension, constipation,diarrhea or pain  No genitourinary symptoms  Extremity problems as discussed  No back problem  No psychotic issues  Neurologic issues as discussed  No hematologic, dermatologic or endocrine problems          PATIENT HISTORY:  Past Medical History:   Diagnosis Date   • ADHD (attention deficit hyperactivity disorder)    • Anxiety    • AV mark tachycardia (CMS/HCC) 05/2017    Reentrant ablation. Abstracted from [facility].   • Depression    • GERD (gastroesophageal reflux disease)    • History of atrial tachycardia    • History of supraventricular tachycardia     av node reentrant tachycardia s/p ablation   • Hyperlipidemia    • Hypertension    • MRSA (methicillin resistant staph aureus) culture positive     eye   • Sleep apnea    ,   Past Surgical History:   Procedure Laterality Date   • CARDIAC ABLATION  05/2017   • CARDIAC CATHETERIZATION  2011   • COLONOSCOPY     • CORNEAL TRANSPLANT Left    • EYE SURGERY Left    • UMBILICAL HERNIA REPAIR N/A 11/18/2020    Procedure: UMBILICAL HERNIA REPAIR with mesh;  Surgeon: Fredi Kaye MD;  Location: [facility];  Service: General;  Laterality: N/A;   ,   Family History   Problem Relation Age of Onset   • Diabetes Mother    • Glaucoma Mother    • Brain cancer Mother    • Heart disease Mother    • Coronary artery disease Mother    • Hypertension Father    • Depression Father    • Hyperlipidemia Father    • Alzheimer's disease Father    • Glaucoma Sister    • Other Brother         MVA    • Narcolepsy Son    • Heart disease Other    • Congenital heart disease Brother    ,   Social History     Tobacco Use   • Smoking status: Never Smoker   • Smokeless tobacco: Never Used   Substance Use Topics   •  Alcohol use: Yes     Comment: Occasional   • Drug use: No   ,   Medications Prior to Admission   Medication Sig Dispense Refill Last Dose   • amLODIPine (NORVASC) 10 MG tablet Take 1 tablet by mouth Daily. 90 tablet 1 2/17/2021 at Unknown time   • amphetamine-dextroamphetamine XR (Adderall XR) 30 MG 24 hr capsule Take 1 capsule by mouth Every Morning 30 capsule 0 2/17/2021 at Unknown time   • atorvastatin (LIPITOR) 20 MG tablet Take 1 tablet by mouth Daily. 90 tablet 1 2/17/2021 at Unknown time   • clonazePAM (KlonoPIN) 2 MG tablet TAKE 1 TABLET BY MOUTH AT NIGHT AS NEEDED FOR ANXIETY 30 tablet 1 2/17/2021 at Unknown time   • diphenhydrAMINE (BENADRYL) 25 MG tablet Take 25 mg by mouth At Night As Needed for Allergies.      • FLONASE ALLERGY RELIEF 50 MCG/ACT nasal spray 2 sprays into the nostril(s) as directed by provider Daily As Needed.  2    • fluorometholone (FML) 0.1 % ophthalmic suspension Administer 1 drop into the left eye 2 (Two) Times a Day.      • HYDROcodone-acetaminophen (NORCO) 5-325 MG per tablet Take 1 tablet by mouth Every 4 (Four) Hours As Needed for Mild Pain  or Moderate Pain .      • loratadine (CLARITIN) 10 MG tablet Take 10 mg by mouth Daily.   2/17/2021 at Unknown time   • losartan (COZAAR) 100 MG tablet Take 1 tablet by mouth Daily. 90 tablet 1 2/17/2021 at Unknown time   • sildenafil (REVATIO) 20 MG tablet Take 1-5 tablets by mouth Daily As Needed (intercourse). 50 tablet 3 2/17/2021 at Unknown time   • tamsulosin (FLOMAX) 0.4 MG capsule 24 hr capsule Take 1 capsule by mouth Daily. 90 capsule 1 2/17/2021 at Unknown time   • Vortioxetine HBr (TRINTELLIX) 10 MG tablet Take 10 mg by mouth Daily. 90 tablet 0 2/17/2021 at Unknown time   , Scheduled Meds:  amLODIPine, 10 mg, Oral, Daily  aspirin, 325 mg, Oral, Daily  atorvastatin, 20 mg, Oral, Daily  cetirizine, 10 mg, Oral, Daily  enoxaparin, 40 mg, Subcutaneous, Q24H  losartan, 100 mg, Oral, Daily  sodium chloride, 10 mL, Intravenous, Q12H    ,  Continuous Infusions:   , PRN Meds:  •  acetaminophen **OR** acetaminophen **OR** acetaminophen  •  aluminum-magnesium hydroxide-simethicone  •  bisacodyl  •  clonazePAM  •  magnesium sulfate **OR** magnesium sulfate in D5W 1g/100mL (PREMIX)  •  melatonin  •  nitroglycerin  •  ondansetron **OR** ondansetron  •  potassium chloride  •  [COMPLETED] Insert peripheral IV **AND** sodium chloride  •  sodium chloride, Allergies:  Sulfa antibiotics    ________________________________________________________        OBJECTIVE:  Upon today's exam, the gentleman is resting comfortably in bed in no acute distress      Neurologic Exam    The patient is awake and alert and oriented x3     Cranial nerve examination demonstrate:  Full fields of vision to confrontation  Pupils are round, reactive to light and accommodation and size of about 3 mm  No ptosis or nystagmus  Funduscopic examination was not successful  Eye movements are conjugate     Sensation on the face and scalp are normal  Muscles of mastication are normal and symmetric  Muscles of  facial expression are normal and symmetric  Hearing is intact bilaterally  Head turning and shoulder shrugs were unremarkable  Tongue was midline  I could not visualize her oropharynx or uvula     Motor examination:  Normal bulk, tone and strength was 5/5  No fasciculations     Sensory examination:  Intact for soft touch, pain and position sensation  Romberg was not evaluated     Reflexes:  0/4     Coordination:  Normal finger-to-nose to finger, rapid alternating movements and toe to finger     Gait:  Deferred     Toe signs:  Mute    ________________________________________________________   RESULTS REVIEW:    VITAL SIGNS:   Temp:  [97.5 °F (36.4 °C)-98.7 °F (37.1 °C)] 98.1 °F (36.7 °C)  Heart Rate:  [] 79  Resp:  [16-17] 16  BP: ()/(60-86) 128/76     LABS:  WBC   Date Value Ref Range Status   02/19/2021 6.20 3.40 - 10.80 10*3/mm3 Final     RBC   Date Value Ref Range Status    02/19/2021 4.60 4.14 - 5.80 10*6/mm3 Final     Hemoglobin   Date Value Ref Range Status   02/19/2021 13.6 13.0 - 17.7 g/dL Final     Hematocrit   Date Value Ref Range Status   02/19/2021 40.4 37.5 - 51.0 % Final     MCV   Date Value Ref Range Status   02/19/2021 87.8 79.0 - 97.0 fL Final     MCH   Date Value Ref Range Status   02/19/2021 29.6 26.6 - 33.0 pg Final     MCHC   Date Value Ref Range Status   02/19/2021 33.6 31.5 - 35.7 g/dL Final     RDW   Date Value Ref Range Status   02/19/2021 13.7 12.3 - 15.4 % Final     RDW-SD   Date Value Ref Range Status   02/19/2021 41.6 37.0 - 54.0 fl Final     MPV   Date Value Ref Range Status   02/19/2021 8.5 6.0 - 12.0 fL Final     Platelets   Date Value Ref Range Status   02/19/2021 250 140 - 450 10*3/mm3 Final     Neutrophil %   Date Value Ref Range Status   02/19/2021 50.9 42.7 - 76.0 % Final     Lymphocyte %   Date Value Ref Range Status   02/19/2021 36.8 19.6 - 45.3 % Final     Monocyte %   Date Value Ref Range Status   02/19/2021 9.5 5.0 - 12.0 % Final     Eosinophil %   Date Value Ref Range Status   02/19/2021 1.8 0.3 - 6.2 % Final     Basophil %   Date Value Ref Range Status   02/19/2021 1.0 0.0 - 1.5 % Final     Neutrophils, Absolute   Date Value Ref Range Status   02/19/2021 3.10 1.70 - 7.00 10*3/mm3 Final     Lymphocytes, Absolute   Date Value Ref Range Status   02/19/2021 2.30 0.70 - 3.10 10*3/mm3 Final     Monocytes, Absolute   Date Value Ref Range Status   02/19/2021 0.60 0.10 - 0.90 10*3/mm3 Final     Eosinophils, Absolute   Date Value Ref Range Status   02/19/2021 0.10 0.00 - 0.40 10*3/mm3 Final     Basophils, Absolute   Date Value Ref Range Status   02/19/2021 0.10 0.00 - 0.20 10*3/mm3 Final     nRBC   Date Value Ref Range Status   02/19/2021 0.2 0.0 - 0.2 /100 WBC Final     Glucose   Date Value Ref Range Status   02/19/2021 122 (H) 65 - 99 mg/dL Final     BUN   Date Value Ref Range Status   02/19/2021 18 6 - 20 mg/dL Final     Creatinine   Date Value Ref  Range Status   02/19/2021 1.00 0.76 - 1.27 mg/dL Final     Sodium   Date Value Ref Range Status   02/19/2021 142 136 - 145 mmol/L Final     Potassium   Date Value Ref Range Status   02/19/2021 4.1 3.5 - 5.2 mmol/L Final     Chloride   Date Value Ref Range Status   02/19/2021 107 98 - 107 mmol/L Final     CO2   Date Value Ref Range Status   02/19/2021 27.0 22.0 - 29.0 mmol/L Final     Calcium   Date Value Ref Range Status   02/19/2021 8.8 8.6 - 10.5 mg/dL Final     Total Protein   Date Value Ref Range Status   02/18/2021 6.8 6.0 - 8.5 g/dL Final     Albumin   Date Value Ref Range Status   02/18/2021 4.30 3.50 - 5.20 g/dL Final     ALT (SGPT)   Date Value Ref Range Status   02/18/2021 12 1 - 41 U/L Final     AST (SGOT)   Date Value Ref Range Status   02/18/2021 16 1 - 40 U/L Final     Comment:     Slight hemolysis detected by analyzer. Results may be affected.     Alkaline Phosphatase   Date Value Ref Range Status   02/18/2021 123 (H) 39 - 117 U/L Final     Total Bilirubin   Date Value Ref Range Status   02/18/2021 0.5 0.0 - 1.2 mg/dL Final     eGFR Non  Amer   Date Value Ref Range Status   02/19/2021 78 >60 mL/min/1.73 Final     BUN/Creatinine Ratio   Date Value Ref Range Status   02/19/2021 18.0 7.0 - 25.0 Final     Anion Gap   Date Value Ref Range Status   02/19/2021 8.0 5.0 - 15.0 mmol/L Final       Lab Results   Component Value Date    TSH 2.320 02/18/2021     (H) 11/12/2020         IMAGING STUDIES:  Ct Head Without Contrast    Result Date: 2/18/2021  No acute intracranial finding.  Electronically Signed By-Eugenie Gillette MD On:2/18/2021 11:13 AM This report was finalized on [id number] by  Eugenie Gillette MD.    Xr Chest 1 View    Result Date: 2/18/2021  No acute cardiopulmonary findings.  Electronically Signed By-Sixto Bryant MD On:2/18/2021 10:54 AM This report was finalized on [id number] by  Sixto Bryant MD.      I reviewed the patient's new clinical  results.      ________________________________________________________     PROBLEM LIST:    Syncope    Attention deficit disorder (ADD) without hyperactivity    Dyslipidemia    Essential hypertension    History of supraventricular tachycardia    LAQUITA (obstructive sleep apnea)    Overweight (BMI 25.0-29.9)          Assessment/Plan   ASSESSMENT/PLAN:  Several syncopal episodes, all of them typical when he is up and standing and all of them happen in the morning.  So far the disease not look like seizures are epileptic.  I think that hemodynamic.  Whether he has autonomic dysfunction that remains to be seen.  I do not have enough evidence to diagnose him with epilepsy, he does pass out so loss of consciousness precautions apply still.    I do recommend outpatient EEG and MRI but no antiepileptics right now.  I recommend cardiac work-up and random blood pressure measurement may not help he may need tilt table all he may need other testing as per cardiology and possible autonomic work-up if nothing else shows up.    He told me that he has lost 50 to 80 pounds in about 8 months, I want to make sure the team is aware that he can have other issues going on rather than just electively losing weight because I have my doubts that she can lose this much and 8 months.  He may need paraneoplastic work-up.    I am going to put him on loss of consciousness precautions which are the same or similar to seizure precautions but epilepsy has not been established yet    Work-up as outpatient      - Fall, syncope precautions (see below)    SEIZURE/SYNCOPE INSTRUCTIONS:  -Recommended to observe all seizure precautions, including, but not limited to:   -No driving until seizure free for more than 3 months- as per State driving regulation / law;   -Avoid all high-risk activity, Take showers instead of baths, Avoid swimming without observation, Avoid open heat sources, Avoid working at heights, and Avoid engaging in all potentially hazardous  activities.   -Patient expressed clear understanding.          I discussed the patient's findings and my recommendations with patient and nursing staff    Momo Lara MD  02/19/21  11:45 EST

## 2021-02-19 NOTE — CONSULTS
CC--recurrent syncope      Sub== 55 old male patient seen several days ago for incessant SVT underwent slow pathway ablation several years ago with complete resolution of symptoms.  In the last 3 months has been having recurrent episodes of syncope.  Syncope usually happens early in the morning with premonitory symptoms of flushing in the face and post syncope has significant fatigue lasting for hours.  He denies any urinary incontinence or any rapid palpitations.  He had total of 4 episodes which prompted admission to the hospital and my consultation requested.  He denies recurrent symptoms of arrhythmias.  He is on 2 antihypertensive medications and often notes that his blood pressure is low in the morning time.  He has treated sleep apnea        Past Medical History:   Diagnosis Date   • ADHD (attention deficit hyperactivity disorder)    • Anxiety    • AV mark tachycardia (CMS/HCC) 05/2017    Reentrant ablation. Abstracted from [facility].   • Depression    • GERD (gastroesophageal reflux disease)    • History of atrial tachycardia    • History of supraventricular tachycardia     av node reentrant tachycardia s/p ablation   • Hyperlipidemia    • Hypertension    • MRSA (methicillin resistant staph aureus) culture positive     eye   • Sleep apnea      Past Surgical History:   Procedure Laterality Date   • CARDIAC ABLATION  05/2017   • CARDIAC CATHETERIZATION  2011   • COLONOSCOPY     • CORNEAL TRANSPLANT Left    • EYE SURGERY Left    • UMBILICAL HERNIA REPAIR N/A 11/18/2020    Procedure: UMBILICAL HERNIA REPAIR with mesh;  Surgeon: Fredi Kaye MD;  Location: [facility] OR;  Service: General;  Laterality: N/A;     Family History   Problem Relation Age of Onset   • Diabetes Mother    • Glaucoma Mother    • Brain cancer Mother    • Heart disease Mother    • Coronary artery disease Mother    • Hypertension Father    • Depression Father    • Hyperlipidemia Father    • Alzheimer's disease Father    • Glaucoma  Sister    • Other Brother         MVA    • Narcolepsy Son    • Heart disease Other    • Congenital heart disease Brother      Social History     Tobacco Use   • Smoking status: Never Smoker   • Smokeless tobacco: Never Used   Substance Use Topics   • Alcohol use: Yes     Comment: Occasional   • Drug use: No     Medications Prior to Admission   Medication Sig Dispense Refill Last Dose   • amLODIPine (NORVASC) 10 MG tablet Take 1 tablet by mouth Daily. 90 tablet 1 2/17/2021 at Unknown time   • amphetamine-dextroamphetamine XR (Adderall XR) 30 MG 24 hr capsule Take 1 capsule by mouth Every Morning 30 capsule 0 2/17/2021 at Unknown time   • atorvastatin (LIPITOR) 20 MG tablet Take 1 tablet by mouth Daily. 90 tablet 1 2/17/2021 at Unknown time   • clonazePAM (KlonoPIN) 2 MG tablet TAKE 1 TABLET BY MOUTH AT NIGHT AS NEEDED FOR ANXIETY 30 tablet 1 2/17/2021 at Unknown time   • diphenhydrAMINE (BENADRYL) 25 MG tablet Take 25 mg by mouth At Night As Needed for Allergies.      • FLONASE ALLERGY RELIEF 50 MCG/ACT nasal spray 2 sprays into the nostril(s) as directed by provider Daily As Needed.  2    • fluorometholone (FML) 0.1 % ophthalmic suspension Administer 1 drop into the left eye 2 (Two) Times a Day.      • HYDROcodone-acetaminophen (NORCO) 5-325 MG per tablet Take 1 tablet by mouth Every 4 (Four) Hours As Needed for Mild Pain  or Moderate Pain .      • loratadine (CLARITIN) 10 MG tablet Take 10 mg by mouth Daily.   2/17/2021 at Unknown time   • losartan (COZAAR) 100 MG tablet Take 1 tablet by mouth Daily. 90 tablet 1 2/17/2021 at Unknown time   • sildenafil (REVATIO) 20 MG tablet Take 1-5 tablets by mouth Daily As Needed (intercourse). 50 tablet 3 2/17/2021 at Unknown time   • tamsulosin (FLOMAX) 0.4 MG capsule 24 hr capsule Take 1 capsule by mouth Daily. 90 capsule 1 2/17/2021 at Unknown time   • Vortioxetine HBr (TRINTELLIX) 10 MG tablet Take 10 mg by mouth Daily. 90 tablet 0 2/17/2021 at Unknown time     Allergies:   Sulfa antibiotics    Review of Systems   General:  no fatigue and tiredness  Eyes: No redness  Cardiovascular: No chest pain, no palpitations  Respiratory:   no shortness of breath  Gastrointestinal: No nausea or vomiting, bleeding  Genitourinary: no hematuria or dysuria  Musculoskeletal: No arthralgia or myalgia  Skin: No rash  Neurologic: No numbness, tingling  Hematologic/Lymphatic: No abnormal bleeding      Physical Exam  VITALS REVIEWED--blood pressure 100/60 pulse rate 70 patient afebrile respiration 12 times a minute    General:      well developed, well nourished, in no acute distress.    Head:      normocephalic and atraumatic.    Eyes:      PERRL/EOM intact, conjunctiva and sclera clear with out nystagmus.    Neck:      no masses, thyromegaly,  trachea central with normal respiratory effort   Lungs:      clear bilaterally to auscultation.    Heart:       Sinus rhythm  without any murmurs gallops or rubs  Msk:      no deformity or scoliosis noted of thoracic or lumbar spine.    Pulses:      pulses normal in all 4 extremities.    Extremities:       no cyanosis or clubbing  Neurologic:      no focal deficits.   alert oriented x3  Skin:      intact without lesions or rashes.    Psych:      alert and cooperative; normal mood and affect; normal attention span and concentration.          CBC    Results from last 7 days   Lab Units 02/19/21  0305 02/18/21  1021   WBC 10*3/mm3 6.20 6.20   HEMOGLOBIN g/dL 13.6 14.7   PLATELETS 10*3/mm3 250 281     BMP   Results from last 7 days   Lab Units 02/19/21  0305 02/18/21  1021   SODIUM mmol/L 142 139   POTASSIUM mmol/L 4.1 4.1   CHLORIDE mmol/L 107 104   CO2 mmol/L 27.0 26.0   BUN mg/dL 18 14   CREATININE mg/dL 1.00 1.09   GLUCOSE mg/dL 122* 96   MAGNESIUM mg/dL 2.3 2.3     Coag     Infection     CMP   Results from last 7 days   Lab Units 02/19/21  0305 02/18/21  1021   SODIUM mmol/L 142 139   POTASSIUM mmol/L 4.1 4.1   CHLORIDE mmol/L 107 104   CO2 mmol/L 27.0 26.0   BUN  mg/dL 18 14   CREATININE mg/dL 1.00 1.09   GLUCOSE mg/dL 122* 96   ALBUMIN g/dL  --  4.30   BILIRUBIN mg/dL  --  0.5   ALK PHOS U/L  --  123*   AST (SGOT) U/L  --  16   ALT (SGPT) U/L  --  12     Radiology(recent) Ct Head Without Contrast    Result Date: 2/18/2021  No acute intracranial finding.  Electronically Signed By-Eugenie Gillette MD On:2/18/2021 11:13 AM This report was finalized on [id number] by  Eugenie Gillette MD.    Xr Chest 1 View    Result Date: 2/18/2021  No acute cardiopulmonary findings.  Electronically Signed By-Sixto Bryant MD On:2/18/2021 10:54 AM This report was finalized on [id number] by  Sixto Bryant MD.          Assessment plan    Syncope description is highly suspicious for vasovagal syncope--- stop losartan and continue amlodipine  Plenty of hydration, avoidance of caffeinated products, avoidance of sympathomimetic products educated  Patient to follow-up in our office in few weeks  Patient educated about monitoring his pulse rate by using a smart watch application after discussing options  History of SVT status post ablation several years ago without recurrence  Treated sleep apnea  Essential hypertension  Cussed with the patient and nurse and orders placed about stopping losartan      Electronically signed by Henry Springer MD, 02/19/21, 1:55 PM EST.

## 2021-02-19 NOTE — PROGRESS NOTES
Discharge Planning Assessment  [facility]     Patient Name: Choco Bryant  MRN: [id number]  Today's Date: 2/19/2021    Admit Date: 2/18/2021    Discharge Needs Assessment     Row Name 02/19/21 1408       Living Environment    Lives With  spouse    Name(s) of Who Lives With Patient  Spouse- Nuvia    Current Living Arrangements  home/apartment/condo    Primary Care Provided by  self    Provides Primary Care For  no one    Family Caregiver if Needed  spouse    Quality of Family Relationships  supportive    Able to Return to Prior Arrangements  yes       Resource/Environmental Concerns    Resource/Environmental Concerns  none    Transportation Concerns  car, none       Transition Planning    Patient/Family Anticipates Transition to  home with family    Patient/Family Anticipated Services at Transition  none    Transportation Anticipated  family or friend will provide       Discharge Needs Assessment    Readmission Within the Last 30 Days  no previous admission in last 30 days    Equipment Currently Used at Home  cpap Cpap through [place]    Concerns to be Addressed  no discharge needs identified;denies needs/concerns at this time    Anticipated Changes Related to Illness  none    Equipment Needed After Discharge  none    Provided Post Acute Provider List?  Refused    Discharge Coordination/Progress  PCP Jacinta Penn, reports no trouble affording medications at this time. Preferred pharmacy confirmed and verified in [employer] [employer]. [address].        Discharge Plan     Row Name 02/19/21 1409       Plan    Plan  DC Plan: Anticipate routine home, denies needs at this time.    Patient/Family in Agreement with Plan  yes    Plan Comments  CM met with patient at bedside to discuss dc planning. Patient reports his insurance ([employer]) is through his wife's employed through [employer].. He reports they have a payment plan already from a previous visit. Due to patient's status, CM contacted Wendy from  [employer] to inquire about how much a BP cuff would be. Per [employer] Customer Service, depends on size but usually start out about $60. CM notified patient of cost. Patient reports he does already have one that goes on his wrist so he will monitor his BP at home with that. Patient declined needs for any DME or HH services at this time. CM will continue to follow. DC Barriers: Anticipate dc once cleared by neurology and cardiology.        Expected Discharge Date and Time     Expected Discharge Date Expected Discharge Time    Feb 19, 2021         Demographic Summary     Row Name 02/19/21 1408       General Information    Admission Type  observation    Reason for Consult  discharge planning    Preferred Language  English     Used During This Interaction  no       Contact Information    Permission Granted to Share Info With          Functional Status     Row Name 02/19/21 1407       Functional Status    Usual Activity Tolerance  good    Current Activity Tolerance  good       Functional Status, IADL    Medications  independent    Meal Preparation  independent    Housekeeping  independent    Laundry  independent    Shopping  independent        Met with patient in room wearing PPE: mask/goggles.      Maintained distance greater than six feet and spent less than 15 minutes in the room.      Jennyfer Vasquez RN     Office Phone: [phone]  Office Cell: [phone]

## 2021-02-19 NOTE — PLAN OF CARE
Problem: Adult Inpatient Plan of Care  Goal: Plan of Care Review  Outcome: Ongoing, Progressing  Flowsheets (Taken 2/19/2021 0206)  Progress: no change  Plan of Care Reviewed With: patient  Outcome Summary: Pt rested comfortably through the night.  Goal: Patient-Specific Goal (Individualized)  Outcome: Ongoing, Progressing  Goal: Absence of Hospital-Acquired Illness or Injury  Outcome: Ongoing, Progressing  Intervention: Identify and Manage Fall Risk  Recent Flowsheet Documentation  Taken 2/18/2021 2300 by Sommer Sanches RN  Safety Promotion/Fall Prevention:   assistive device/personal items within reach   clutter free environment maintained   fall prevention program maintained   lighting adjusted   nonskid shoes/slippers when out of bed   room organization consistent   safety round/check completed  Taken 2/18/2021 1930 by Sommer Sanches RN  Safety Promotion/Fall Prevention:   assistive device/personal items within reach   clutter free environment maintained   lighting adjusted   nonskid shoes/slippers when out of bed   room organization consistent   safety round/check completed  Intervention: Prevent Skin Injury  Recent Flowsheet Documentation  Taken 2/18/2021 2300 by Sommer Sanches RN  Body Position: position changed independently  Taken 2/18/2021 1930 by Sommer Sanches RN  Body Position: position changed independently  Intervention: Prevent Infection  Recent Flowsheet Documentation  Taken 2/18/2021 2300 by Sommer Sanches RN  Infection Prevention:   environmental surveillance performed   equipment surfaces disinfected   hand hygiene promoted   personal protective equipment utilized   rest/sleep promoted   single patient room provided   visitors restricted/screened  Taken 2/18/2021 1930 by Sommer Sanches RN  Infection Prevention:   environmental surveillance performed   equipment surfaces disinfected   hand hygiene promoted   personal protective equipment utilized   rest/sleep promoted   single patient room provided    visitors restricted/screened  Goal: Optimal Comfort and Wellbeing  Outcome: Ongoing, Progressing  Intervention: Provide Person-Centered Care  Recent Flowsheet Documentation  Taken 2/18/2021 1930 by Sommer Sanches RN  Trust Relationship/Rapport:   care explained   choices provided   emotional support provided   empathic listening provided   questions answered   questions encouraged   reassurance provided   thoughts/feelings acknowledged  Goal: Readiness for Transition of Care  Outcome: Ongoing, Progressing     Problem: Syncope  Goal: Absence of Syncopal Symptoms  Outcome: Ongoing, Progressing  Intervention: Manage Effect of Syncopal Symptoms  Recent Flowsheet Documentation  Taken 2/18/2021 1930 by Sommer Sanches RN  Syncope Management: position changed slowly  Supportive Measures: active listening utilized   Goal Outcome Evaluation:  Plan of Care Reviewed With: patient  Progress: no change  Outcome Summary: Pt rested comfortably through the night.

## 2021-02-19 NOTE — DISCHARGE SUMMARY
Date of Admission: 2/18/2021  105/1    Date of Discharge:  2/19/2021    Length of stay:  LOS: 0 days     Patient was examined with relevant and adequate PPE keeping in mind the current coronavirus pandemic. Minimum of 10 minutes to don and doff PPE.      Presenting Problem/History of Present Illness   Present on Admission:  • Syncope  • Essential hypertension  • Dyslipidemia  • Attention deficit disorder (ADD) without hyperactivity  • LAQUITA (obstructive sleep apnea)  • Overweight (BMI 25.0-29.9)        Hospital Course  Chief complaint:  Chief Complaint   Patient presents with   • Dizziness     dizziness for 4 months, pt states this morning he had 3 spells but was able to get to the bed before passing out.       Choco Bryant 55 y.o. male.    PCP  Jacinta Penn APRN (General)    Mr. Bryant is a 55 y.o. male with a history of HTN, SVT s/p ablation, anxiety, depression, ADD, insomnia, and sleep apnea who presents to [facility] ED on 02/18/2021 complaining of dizziness and passing out. The patient states over the past few months he has had several episodes of becoming dizzy and flushed, then passing out. He states when he passes out he is out for several hours. He states his wife witnessed one of his episodes and denies any seizure-like activity. He states he saw his PCP recently and had an EKG done. He states he has an upcoming appointment with Dr. Springer on 03/04/21. He denies any chest pain or shortness of breath. He denies any exacerbating or alleviating factors. He states when he passed out today he was out for 1 hour. He states he came to the ER for further evaluation.      The patient denies tobacco, alcohol or illicit drug use. He reports a family history of heart disease.      Upon arrival to the ER the patient's orthostatic vital signs were unremarkable. His troponin was normal and his EKG showed sinus rhythm. His CBC and CMP were unremarkable. His magnesium was within normal limits. His CT  head showed no acute findings. His CXR showed no acute findings. He was admitted for observation and further evaluation.       Patient gives history of prolonged passing out episodes.  Some shorter than others.  He states he is exhausted and very sleepy after that.  No bodily injuries each time.      Patient was seen in consult by neurology and cardiology.  Per cardiology he appears to be vasovagal..  Follow-up in the office.  No driving until seen by cardiology outpatient.    ROS  Constitution: Positive for diaphoresis.   Cardiovascular: Positive for syncope. Negative for chest pain.   Respiratory: Negative for shortness of breath.    Neurological: Positive for dizziness.   All other systems reviewed and are negative.  Noted      Family History   Problem Relation Age of Onset   • Diabetes Mother    • Glaucoma Mother    • Brain cancer Mother    • Heart disease Mother    • Coronary artery disease Mother    • Hypertension Father    • Depression Father    • Hyperlipidemia Father    • Alzheimer's disease Father    • Glaucoma Sister    • Other Brother         MVA    • Narcolepsy Son    • Heart disease Other    • Congenital heart disease Brother         Past Medical History:   Diagnosis Date   • ADHD (attention deficit hyperactivity disorder)    • Anxiety    • AV mark tachycardia (CMS/HCC) 05/2017    [facility]   • Depression    • GERD (gastroesophageal reflux disease)    • History of atrial tachycardia    • History of supraventricular tachycardia     av node reentrant tachycardia s/p ablation   • Hyperlipidemia    • Hypertension    • MRSA (methicillin resistant staph aureus) culture positive     eye   • Sleep apnea        Past Surgical History:   Procedure Laterality Date   • CARDIAC ABLATION  05/2017   • CARDIAC CATHETERIZATION  2011   • COLONOSCOPY     • CORNEAL TRANSPLANT Left    • EYE SURGERY Left    • UMBILICAL HERNIA REPAIR N/A 11/18/2020    Procedure: UMBILICAL HERNIA REPAIR  with mesh;  Surgeon: Fredi Kaye MD;  Location: [facility] MAIN OR;  Service: General;  Laterality: N/A;       Social History     Socioeconomic History   • Marital status:      Spouse name: Not on file   • Number of children: 2   • Years of education: Not on file   • Highest education level: Not on file   Occupational History   • Occupation: computer drafting      Employer: [employer]   Tobacco Use   • Smoking status: Never Smoker   • Smokeless tobacco: Never Used   Substance and Sexual Activity   • Alcohol use: Yes     Comment: Occasional   • Drug use: No   • Sexual activity: Defer       Vital Signs  Temp:  [97.5 °F (36.4 °C)-98.7 °F (37.1 °C)] 98.1 °F (36.7 °C)  Heart Rate:  [] 79  Resp:  [16] 16  BP: ()/(60-85) 128/76  Weight change:     Physical Exam:  Physical Exam  Constitutional: Patient appears well-developed and well-nourished and in no acute distress      HEENT:   Head: Normocephalic and atraumatic.   Eyes:  Pupils are equal, round, and reactive to light. EOM are intact. Sclera are anicteric and non-injected.  Mouth and Throat: Patient has moist mucous membranes. Oropharynx is clear of any erythema or exudate.        Neck: Neck supple.  No thyromegaly present. No lymphadenopathy present. No  masses.      Cardiovascular: Inspection: No JVD present. Palpation: No parasternal heave. Pedal pulses +1 bilaterally. No leg edema. Auscultation: Regular rate, regular rhythm, S1 normal and S2 normal. reveals no gallop and no friction rub. No Carotid bruit bilaterally.     Pulmonary/Chest: Inspection: No distress, no use of accessory muscles. Lungs are clear to auscultation bilaterally. No respiratory distress. No wheezes. No rales.      Abdomen /Gastrointestinal: Inspection: no distension. Palpation: no masses, no organomegaly. Soft. There is no tenderness. Bowel sounds are normal.      Musculoskeletal: Normal Muscle tone. Age appropriate, no deformities.     Neurological:  Patient is alert and oriented to person, place, and time. Cranial nerves II-XII are grossly intact with no focal deficits. Sensori-motor exam is normal. No cerebellar signs.     Skin: Skin is warm. No rash noted. Nails show no clubbing.  No cyanosis or erythema. No bruising.     Emotional Behavior:   Appropriate       Debilities:  Age appropriate  Reviewed, no change in above data from the prior day.         Wounds (last 24 hours)      LDA Wound     Row Name               [REMOVED] Wound 11/18/20 0736 abdomen Incision    Wound - Properties Group Placement Date: 11/18/20  -LT Placement Time: 0736 -LT Location: abdomen  -LT Primary Wound Type: Incision  -LT Removal Date: 02/18/21  -AS Removal Time: 1438 -AS    Retired Wound - Properties Group Date first assessed: 11/18/20  -LT Time first assessed: 0736  -LT Location: abdomen  -LT Primary Wound Type: Incision  -LT Resolution Date: 02/18/21  -AS Resolution Time: 1438 -AS      User Key  (r) = Recorded By, (t) = Taken By, (c) = Cosigned By    Initials Name Provider Type    LT Vandana Landin, RN Registered Nurse    AS Arabella Castillo, RN Registered Nurse          Discharge Diagnosis:     Active Hospital Problems    Diagnosis  POA   • **Syncope [R55]  Yes     Priority: High   • Overweight (BMI 25.0-29.9) [E66.3]  Yes   • LAQUITA (obstructive sleep apnea) [G47.33]  Yes   • Attention deficit disorder (ADD) without hyperactivity [F98.8]  Yes   • History of supraventricular tachycardia [Z86.79]  Not Applicable   • Essential hypertension [I10]  Yes   • Dyslipidemia [E78.5]  Yes      Resolved Hospital Problems   No resolved problems to display.       Estimated Creatinine Clearance: 96.6 mL/min (by C-G formula based on SCr of 1 mg/dL).    Discharge Disposition    Continued Care and Services - Admitted Since 2/18/2021    Coordination has not been started for this encounter.             PT Recommendation and Plan          Home or Self Care           Discharge Medications       Continue These Medications      Instructions Start Date   amLODIPine 10 MG tablet  Commonly known as: NORVASC   10 mg, Oral, Daily      amphetamine-dextroamphetamine XR 30 MG 24 hr capsule  Commonly known as: Adderall XR   30 mg, Oral, Every Morning      atorvastatin 20 MG tablet  Commonly known as: LIPITOR   20 mg, Oral, Daily      clonazePAM 2 MG tablet  Commonly known as: KlonoPIN   2 mg, Oral, Nightly PRN      Flonase Allergy Relief 50 MCG/ACT nasal spray  Generic drug: fluticasone   2 sprays, Nasal, Daily PRN      fluorometholone 0.1 % ophthalmic suspension  Commonly known as: FML   1 drop, Left Eye, 2 Times Daily      HYDROcodone-acetaminophen 5-325 MG per tablet  Commonly known as: NORCO   1 tablet, Oral, Every 4 Hours PRN      loratadine 10 MG tablet  Commonly known as: CLARITIN   10 mg, Oral, Daily      losartan 100 MG tablet  Commonly known as: COZAAR   100 mg, Oral, Daily      sildenafil 20 MG tablet  Commonly known as: REVATIO    mg, Oral, Daily PRN      tamsulosin 0.4 MG capsule 24 hr capsule  Commonly known as: FLOMAX   0.4 mg, Oral, Daily      Vortioxetine HBr 10 MG tablet  Commonly known as: Trintellix   10 mg, Oral, Daily         Stop These Medications    diphenhydrAMINE 25 MG tablet  Commonly known as: BENADRYL          Discharge medications personally reviewed by me and med rec done by me personally.  02/19/21, 2:06 PM EST        Consults:   Consults     Date and Time Order Name Status Description    2/18/2021 1901 Inpatient Neurology Consult General Completed     2/18/2021 1901 Inpatient Cardiac Electrophysiology Consult      2/18/2021 1138 Hospitalist (on-call MD unless specified) Completed           Procedures Performed:    * No surgery found *        Pertinent Test Results:   Results from last 7 days   Lab Units 02/19/21  0305 02/18/21  1021   WBC 10*3/mm3 6.20 6.20   HEMOGLOBIN g/dL 13.6 14.7   HEMATOCRIT % 40.4 43.0   MCV fL 87.8 88.2   MCH pg 29.6 30.2   PLATELETS 10*3/mm3 250 281      Results from last 7 days   Lab Units 02/19/21  0305 02/18/21  1021   SODIUM mmol/L 142 139   POTASSIUM mmol/L 4.1 4.1   CHLORIDE mmol/L 107 104   CO2 mmol/L 27.0 26.0   BUN mg/dL 18 14   CREATININE mg/dL 1.00 1.09   CALCIUM mg/dL 8.8 9.3   MAGNESIUM mg/dL 2.3 2.3   BILIRUBIN mg/dL  --  0.5   ALK PHOS U/L  --  123*   ALT (SGPT) U/L  --  12   AST (SGOT) U/L  --  16   GLUCOSE mg/dL 122* 96     Lab Results   Component Value Date    CALCIUM 8.8 02/19/2021     No results found for: HGBA1C  Lab Results   Component Value Date    CHOL 241 (H) 11/12/2020    TRIG 87 11/12/2020    HDL 38 (L) 11/12/2020     (H) 11/12/2020     No results found for: LIPASE      Pathology  No results found for: INTRAOP, PREDX, FINALDX, COMDX  Inflammatory Biomarkers        Invalid input(s): ESR, D-DIMER QUANTITATIVE,  PROCALCITONIN  COVID19   Date Value Ref Range Status   02/18/2021 Not Detected Not Detected - Ref. Range Final        Microbiology Results (last 10 days)     Procedure Component Value - Date/Time    COVID-19,[facility]-HOUSE, NP/OP SWAB IN UTM/VTM/SALINE TRANSPORT MEDIA,24 HR TAT - Swab, Nasopharynx [[id number]]  (Normal) Collected: 02/18/21 1201    Lab Status: Final result Specimen: Swab from Nasopharynx Updated: 02/18/21 1944     COVID19 Not Detected    Narrative:      Fact sheet for providers: https://www.fda.gov/media/291036/download     Fact sheet for patients: https://www.fda.gov/media/317047/download    Test performed by RT PCR.          ECG/EMG Results (most recent)     Procedure Component Value Units Date/Time    ECG 12 Lead [[id number]] Collected: 02/18/21 0943     Updated: 02/18/21 1246     QT Interval 376 ms     Narrative:      HEART RATE= 84  bpm  RR Interval= 716  ms  HI Interval= 167  ms  P Horizontal Axis= 0  deg  P Front Axis= 36  deg  QRSD Interval= 94  ms  QT Interval= 376  ms  QRS Axis= 40  deg  T Wave Axis= 52  deg  - NORMAL ECG -  Sinus rhythm  When compared with ECG of 12-Nov-2020  6:52:33,  No significant change  Electronically Signed By: Manish Salinas ([facility]) 18-Feb-2021 12:44:49  Date and Time of Study: 2021-02-18 09:43:46    Adult Transthoracic Echo Complete With Contrast if Necessary Per Protocol (With Agitated Saline) [[id number]] Collected: 02/18/21 1338     Updated: 02/18/21 1632     BSA 2.1 m^2      RVIDd 2.4 cm      IVSd 0.98 cm      LVIDd 5.1 cm      LVIDs 3.1 cm      LVPWd 0.83 cm      IVS/LVPW 1.2     FS 40.1 %      EDV(Teich) 125.6 ml      ESV(Teich) 37.1 ml      EF(Teich) 70.5 %      EDV(cubed) 135.1 ml      ESV(cubed) 29.0 ml      EF(cubed) 78.5 %      LV mass(C)d 165.4 grams      LV mass(C)dI 78.7 grams/m^2      SV(Teich) 88.5 ml      SI(Teich) 42.1 ml/m^2      SV(cubed) 106.1 ml      SI(cubed) 50.5 ml/m^2      Ao root diam 3.5 cm      Ao root area 9.7 cm^2      ACS 2.2 cm      asc Aorta Diam 2.5 cm      LVOT diam 2.4 cm      LVOT area 4.6 cm^2      RVOT diam 2.2 cm      RVOT area 3.9 cm^2      EDV(MOD-sp4) 106.7 ml      ESV(MOD-sp4) 37.2 ml      EF(MOD-sp4) 65.2 %      SV(MOD-sp4) 69.5 ml      SI(MOD-sp4) 33.1 ml/m^2      Ao root area (BSA corrected) 1.7     LV Fields Vol (BSA corrected) 50.8 ml/m^2      LV Sys Vol (BSA corrected) 17.7 ml/m^2      MV E max erasto 65.9 cm/sec      MV A max erasto 57.8 cm/sec      MV E/A 1.1     MV V2 max 77.8 cm/sec      MV max PG 2.4 mmHg      MV V2 mean 52.1 cm/sec      MV mean PG 1.2 mmHg      MV V2 VTI 16.3 cm      MVA(VTI) 5.3 cm^2      MV dec slope 350.6 cm/sec^2      MV dec time 0.19 sec      Ao pk erasto 103.4 cm/sec      Ao max PG 4.3 mmHg      Ao max PG (full) -0.05 mmHg      Ao V2 mean 73.1 cm/sec      Ao mean PG 2.4 mmHg      Ao mean PG (full) 0.43 mmHg      Ao V2 VTI 19.6 cm      MINISTERIO(I,A) 4.5 cm^2      MINISTERIO(I,D) 4.5 cm^2      MINISTERIO(V,A) 4.7 cm^2      MINISTERIO(V,D) 4.7 cm^2      LV V1 max PG 4.3 mmHg      LV V1 mean PG 2.0 mmHg      LV V1 max 104.0 cm/sec      LV V1 mean 64.6 cm/sec      LV V1 VTI 18.8 cm      SV(Ao) 190.4 ml      SI(Ao) 90.6 ml/m^2       SV(LVOT) 87.1 ml      SV(RVOT) 47.2 ml      SI(LVOT) 41.5 ml/m^2      PA V2 max 68.9 cm/sec      PA max PG 1.9 mmHg      PA max PG (full) 0.9 mmHg      PA V2 mean 46.1 cm/sec      PA mean PG 0.99 mmHg      PA mean PG (full) 0.36 mmHg      PA V2 VTI 12.1 cm      PVA(I,A) 3.9 cm^2       CV ECHO DAIJA - PVA(I,D) 3.9 cm^2       CV ECHO DAIJA - PVA(V,A) 2.9 cm^2       CV ECHO DAIJA - PVA(V,D) 2.9 cm^2      PA acc time 0.12 sec      RV V1 max PG 1.0 mmHg      RV V1 mean PG 0.63 mmHg      RV V1 max 50.0 cm/sec      RV V1 mean 37.7 cm/sec      RV V1 VTI 12.0 cm      TR max kg 129.3 cm/sec      RVSP(TR) 9.7 mmHg      RAP systole 3.0 mmHg      PA pr(Accel) 25.0 mmHg      Pulm Sys Kg 42.0 cm/sec      Pulm Fields Kg 44.9 cm/sec      Pulm S/D 0.93     Qp/Qs 0.54     Pulm A Revs Dur 0.11 sec      Pulm A Revs Kg 29.6 cm/sec       CV ECHO DAIJA - BZI_BMI 29.1 kilograms/m^2       CV ECHO DAIJA - BSA(HAYCOCK) 2.2 m^2       CV ECHO DAIJA - BZI_METRIC_WEIGHT 92.1 kg       CV ECHO DAIJA - BZI_METRIC_HEIGHT 177.8 cm      EF(MOD-bp) 65.0 %      LA dimension(2D) 3.4 cm      Echo EF Estimated 60 %     Narrative:      · Estimated left ventricular EF = 60% Estimated left ventricular EF was in   agreement with the calculated left ventricular EF. Left ventricular   systolic function is normal.  · The right ventricular cavity is mildly dilated.  · Estimated right ventricular systolic pressure from tricuspid   regurgitation is normal (<35 mmHg).             Results for orders placed during the hospital encounter of 02/18/21   Bilateral Carotid Duplex    Narrative · Right internal carotid artery is normal.  · Left internal carotid artery is normal.          Results for orders placed during the hospital encounter of 02/18/21   Adult Transthoracic Echo Complete With Contrast if Necessary Per Protocol (With Agitated Saline)    Narrative · Estimated left ventricular EF = 60% Estimated left ventricular EF was in   agreement with the  calculated left ventricular EF. Left ventricular   systolic function is normal.  · The right ventricular cavity is mildly dilated.  · Estimated right ventricular systolic pressure from tricuspid   regurgitation is normal (<35 mmHg).          Ct Head Without Contrast    Result Date: 2/18/2021  No acute intracranial finding.  Electronically Signed By-Eugenie Gillette MD On:2/18/2021 11:13 AM This report was finalized on [id number] by  Eugenie Gillette MD.    Xr Chest 1 View    Result Date: 2/18/2021  No acute cardiopulmonary findings.  Electronically Signed By-Sixto Bryant MD On:2/18/2021 10:54 AM This report was finalized on [id number] by  Sixto Bryant MD.      Xrays, labs reviewed personally by me.  02/19/21  2:06 PM EST      Condition on Discharge:    Stable    Discharge Diet:   Dietary Orders (From admission, onward)     Start     Ordered    02/18/21 1244  Diet Cardiac; 2gm Na+  Diet Effective Now     Question Answer Comment   Diet / Texture / Consistency Cardiac    Select Type: 2gm Na+        02/18/21 1245                Activity at Discharge:   Activity Instructions     Activity as Tolerated            Follow-up Appointments    Future Appointments   Date Time Provider Department Center   3/4/2021  3:15 PM Henry Springer MD [facility] None   5/11/2021  4:15 PM Jacinta Penn APRN [facility]       Additional Instructions for the Follow-ups that You Need to Schedule     Discharge Follow-up with PCP   As directed       Currently Documented PCP:    Jacinta Penn APRN    PCP Phone Number:    [phone]     Follow Up Details: If no PCP, call MD finder at [phone]         Discharge Follow-up with Specified Provider: Cardiology; 1 Month   As directed      To: Cardiology    Follow Up: 1 Month           Follow-up Information     Jacinta Penn APRN .    Specialties: Nurse Practitioner, Family Medicine  Why: If no PCP, call MD finder at [phone]  Contact information:  Erum DAWKINS  [address]  [facility] [place] [address]  [phone]                    Test Results Pending at Discharge       Risk for Readmission (LACE) Score: 1 (2/19/2021  6:00 AM)            Mejia Jeffries MD  02/19/21  14:06 EST

## 2021-02-19 NOTE — OUTREACH NOTE
Prep Survey      Responses   [facility] discharged [facility]?  [facility]   Is LACE score < 7 ?  Yes   Emergency Room discharge w/ pulse ox?  No   Eligibility  TCM   Hospital  [facility]   Date of Admission  02/18/21   Date of Discharge  02/19/21   Discharge Disposition  Home or Self Care   Discharge diagnosis  syncope, possibly vasovagal   Does the patient have one of the following disease processes/diagnoses(primary or secondary)?  Other   Does the patient have Home health ordered?  No   Is there a DME ordered?  No   Prep survey completed?  Yes          Rupal Carrasco RN

## 2021-02-19 NOTE — PLAN OF CARE
Goal Outcome Evaluation:     Progress: improving  Outcome Summary: Patient is to discharge today, d/c lisinopril. Follow up with Dr. Springer in a few weeks.

## 2021-02-22 ENCOUNTER — TRANSITIONAL CARE MANAGEMENT TELEPHONE ENCOUNTER (OUTPATIENT)
Dept: CALL CENTER | Facility: HOSPITAL | Age: 56
End: 2021-02-22

## 2021-02-22 NOTE — OUTREACH NOTE
Call Center TCM Note      Responses   [facility] patient discharged from?  [facility]   Does the patient have one of the following disease processes/diagnoses(primary or secondary)?  Other   TCM attempt successful?  No   Unsuccessful attempts  Attempt 1   Call Status  Left message          Prasanna Vigil RN    2/22/2021, 11:01 EST

## 2021-02-22 NOTE — PROGRESS NOTES
Case Management Discharge Note           Selected Continued Care - Discharged on 2/19/2021 Admission date: 2/18/2021 - Discharge disposition: Home or Self Care     Final Discharge Disposition Code: 01 - home or self-care

## 2021-02-22 NOTE — OUTREACH NOTE
Call Center TCM Note      Responses   [facility] patient discharged from?  [facility]   Does the patient have one of the following disease processes/diagnoses(primary or secondary)?  Other   TCM attempt successful?  Yes   Call start time  1221   Call end time  1224   Discharge diagnosis  syncope, possibly vasovagal   Meds reviewed with patient/caregiver?  Yes   Is the patient having any side effects they believe may be caused by any medication additions or changes?  No   Does the patient have all medications ordered at discharge?  Yes   Is the patient taking all medications as directed (includes completed medication regime)?  Yes   Does the patient have a primary care provider?   Yes   Does the patient have an appointment with their PCP within 7 days of discharge?  Yes   Comments regarding PCP  Declines PCP appt at this time. He has a followup with cardiology on 3/4/2021   Has the patient kept scheduled appointments due by today?  N/A   Has home health visited the patient within 72 hours of discharge?  N/A   Psychosocial issues?  No   Did the patient receive a copy of their discharge instructions?  Yes   Nursing interventions  Reviewed instructions with patient   What is the patient's perception of their health status since discharge?  Improving   Is the patient/caregiver able to teach back signs and symptoms related to disease process for when to call PCP?  Yes   Is the patient/caregiver able to teach back signs and symptoms related to disease process for when to call 911?  Yes   Is the patient/caregiver able to teach back the hierarchy of who to call/visit for symptoms/problems? PCP, Specialist, Home health nurse, Urgent Care, ED, 911  Yes   If the patient is a current smoker, are they able to teach back resources for cessation?  Not a smoker   TCM call completed?  Yes   Wrap up additional comments  Doing well. No questions or concerns. Had excellent care.           Prasanna Vigil RN    2/22/2021, 12:24  EST

## 2021-03-04 ENCOUNTER — TELEPHONE (OUTPATIENT)
Dept: FAMILY MEDICINE CLINIC | Facility: CLINIC | Age: 56
End: 2021-03-04

## 2021-03-04 ENCOUNTER — CONSULT (OUTPATIENT)
Dept: CARDIOLOGY | Facility: CLINIC | Age: 56
End: 2021-03-04

## 2021-03-04 VITALS
DIASTOLIC BLOOD PRESSURE: 76 MMHG | RESPIRATION RATE: 18 BRPM | HEART RATE: 75 BPM | OXYGEN SATURATION: 100 % | WEIGHT: 214 LBS | BODY MASS INDEX: 28.36 KG/M2 | SYSTOLIC BLOOD PRESSURE: 114 MMHG | HEIGHT: 73 IN

## 2021-03-04 DIAGNOSIS — R55 VASOVAGAL SYNCOPE: Primary | ICD-10-CM

## 2021-03-04 DIAGNOSIS — I10 ESSENTIAL HYPERTENSION: ICD-10-CM

## 2021-03-04 DIAGNOSIS — I47.1 PAROXYSMAL SVT (SUPRAVENTRICULAR TACHYCARDIA) (HCC): ICD-10-CM

## 2021-03-04 PROCEDURE — 99213 OFFICE O/P EST LOW 20 MIN: CPT | Performed by: INTERNAL MEDICINE

## 2021-03-04 PROCEDURE — 93000 ELECTROCARDIOGRAM COMPLETE: CPT | Performed by: INTERNAL MEDICINE

## 2021-03-04 RX ORDER — PENICILLIN V POTASSIUM 500 MG/1
TABLET ORAL
COMMUNITY
Start: 2021-03-02 | End: 2021-06-04

## 2021-03-04 NOTE — PROGRESS NOTES
CC--vasovagal syncope, essential hypertension and remote history of SVT ablation    Sub== 55 old male patient had a history of incessant SVT and underwent slow pathway ablation several years ago with complete resolution of symptoms.  In the last 3 months has been having recurrent episodes of syncope.  Syncope usually happens early in the morning with premonitory symptoms of flushing in the face and post syncope has significant fatigue lasting for hours.  He denies any urinary incontinence or any rapid palpitations.  He had total of 4 episodes which prompted recent admission to the hospital .  He denies recurrent symptoms of arrhythmias.  He has treated sleep apnea  Patient symptoms and history was consistent with vasovagal syncope and patient was taking losartan which has been stopped and he denies any recurrent symptoms  Denies any recurrent symptoms of syncope since discharge  No palpitations        Past Medical History:   Diagnosis Date   • ADHD (attention deficit hyperactivity disorder)    • Anxiety    • AV mark tachycardia (CMS/HCC) 05/2017    Reentrant ablation. Abstracted from [facility].   • Depression    • GERD (gastroesophageal reflux disease)    • History of atrial tachycardia    • History of supraventricular tachycardia     av node reentrant tachycardia s/p ablation   • Hyperlipidemia    • Hypertension    • MRSA (methicillin resistant staph aureus) culture positive     eye   • Sleep apnea      Past Surgical History:   Procedure Laterality Date   • CARDIAC ABLATION  05/2017   • CARDIAC CATHETERIZATION  2011   • COLONOSCOPY     • CORNEAL TRANSPLANT Left    • EYE SURGERY Left    • UMBILICAL HERNIA REPAIR N/A 11/18/2020    Procedure: UMBILICAL HERNIA REPAIR with mesh;  Surgeon: Fredi Kaye MD;  Location: [facility] MAIN OR;  Service: General;  Laterality: N/A;       Physical Exam  VITALS REVIEWED    General:      well developed, well nourished, in no acute distress.    Head:      normocephalic and  atraumatic.    Eyes:      PERRL/EOM intact, conjunctiva and sclera clear with out nystagmus.    Neck:      no masses, thyromegaly,  trachea central with normal respiratory effort and PMI displaced laterally  Lungs:      clear bilaterally to auscultation.    Heart:      Sinus rhythm without any murmurs or gallops      Assessment plan  Vasovagal syncope and symptoms resolved after stopping losartan and patient reinforced about plenty of hydration.  Nonpharmacological clues reinforced  Patient does have apple watch recordings without any arrhythmia since discharge  Remote history of SVT with slow pathway ablation without recurrence  Mild essential hypertension  Patient does take Adderall which can inadvertently affect vasovagal symptoms which was discussed with the patient  Cardiac wise patient is stable  Follow-up in 3 months      ECG 12 Lead    Date/Time: 3/4/2021 4:09 PM  Performed by: Henry Springer MD  Authorized by: Henry Springer MD   Comparison: compared with previous ECG   Similar to previous ECG  Rhythm: sinus rhythm  Rate: normal  Conduction: conduction normal  QRS axis: normal  Other findings: non-specific ST-T wave changes          Electronically signed by Henry Springer MD, 03/04/21, 4:09 PM EST.

## 2021-03-04 NOTE — TELEPHONE ENCOUNTER
See pt telephone message to me and my response. Please contact him tomorrow and check on him and make sure he got my messages.

## 2021-03-18 DIAGNOSIS — G47.00 INSOMNIA, UNSPECIFIED TYPE: ICD-10-CM

## 2021-03-21 RX ORDER — CLONAZEPAM 2 MG/1
2 TABLET ORAL NIGHTLY PRN
Qty: 30 TABLET | Refills: 1 | Status: SHIPPED | OUTPATIENT
Start: 2021-03-21 | End: 2021-04-19

## 2021-04-19 DIAGNOSIS — G47.00 INSOMNIA, UNSPECIFIED TYPE: ICD-10-CM

## 2021-04-19 RX ORDER — CLONAZEPAM 2 MG/1
2 TABLET ORAL NIGHTLY PRN
Qty: 30 TABLET | Refills: 0 | Status: SHIPPED | OUTPATIENT
Start: 2021-04-19 | End: 2021-06-28 | Stop reason: SDUPTHER

## 2021-04-28 RX ORDER — AMLODIPINE BESYLATE 10 MG/1
10 TABLET ORAL DAILY
Qty: 90 TABLET | Refills: 0 | Status: SHIPPED | OUTPATIENT
Start: 2021-04-28 | End: 2021-10-25

## 2021-05-04 DIAGNOSIS — F98.8 ATTENTION DEFICIT DISORDER (ADD) WITHOUT HYPERACTIVITY: ICD-10-CM

## 2021-05-04 RX ORDER — DEXTROAMPHETAMINE SACCHARATE, AMPHETAMINE ASPARTATE MONOHYDRATE, DEXTROAMPHETAMINE SULFATE AND AMPHETAMINE SULFATE 7.5; 7.5; 7.5; 7.5 MG/1; MG/1; MG/1; MG/1
30 CAPSULE, EXTENDED RELEASE ORAL EVERY MORNING
Qty: 30 CAPSULE | Refills: 0 | Status: SHIPPED | OUTPATIENT
Start: 2021-05-04 | End: 2021-06-09

## 2021-06-04 ENCOUNTER — OFFICE VISIT (OUTPATIENT)
Dept: CARDIOLOGY | Facility: CLINIC | Age: 56
End: 2021-06-04

## 2021-06-04 VITALS
HEART RATE: 73 BPM | BODY MASS INDEX: 31.4 KG/M2 | WEIGHT: 238 LBS | SYSTOLIC BLOOD PRESSURE: 127 MMHG | OXYGEN SATURATION: 99 % | DIASTOLIC BLOOD PRESSURE: 76 MMHG

## 2021-06-04 DIAGNOSIS — R55 VASOVAGAL SYNCOPE: Primary | ICD-10-CM

## 2021-06-04 DIAGNOSIS — I10 ESSENTIAL HYPERTENSION: ICD-10-CM

## 2021-06-04 DIAGNOSIS — I47.1 PAROXYSMAL SVT (SUPRAVENTRICULAR TACHYCARDIA) (HCC): ICD-10-CM

## 2021-06-04 DIAGNOSIS — R00.2 PALPITATIONS: ICD-10-CM

## 2021-06-04 PROCEDURE — 93000 ELECTROCARDIOGRAM COMPLETE: CPT | Performed by: INTERNAL MEDICINE

## 2021-06-04 PROCEDURE — 99213 OFFICE O/P EST LOW 20 MIN: CPT | Performed by: INTERNAL MEDICINE

## 2021-06-04 RX ORDER — OMEPRAZOLE 20 MG/1
20 CAPSULE, DELAYED RELEASE ORAL DAILY
COMMUNITY
End: 2021-12-09

## 2021-06-04 RX ORDER — PSEUDOEPHEDRINE HCL 30 MG
30 TABLET ORAL EVERY 4 HOURS PRN
COMMUNITY
End: 2021-06-04

## 2021-06-04 RX ORDER — MULTIVIT WITH MINERALS/LUTEIN
900 TABLET ORAL DAILY
COMMUNITY

## 2021-06-04 NOTE — PROGRESS NOTES
CC--vasovagal syncope, essential hypertension and remote history of SVT ablation    Sub== 56 old male patient had a history of incessant SVT and underwent slow pathway ablation several years ago with complete resolution of symptoms.  In the last 3 months has been having recurrent episodes of syncope.  Syncope usually happens early in the morning with premonitory symptoms of flushing in the face and post syncope has significant fatigue lasting for hours.  He denies any urinary incontinence or any rapid palpitations.  He had total of 4 episodes which prompted recent admission to the hospital .  He denies recurrent symptoms of arrhythmias.  He has treated sleep apnea  Patient symptoms and history was consistent with vasovagal syncope and patient was taking losartan which has been stopped and he denies any recurrent symptoms  Denies any recurrent symptoms of syncope   No palpitations        Past Medical History:   Diagnosis Date   • ADHD (attention deficit hyperactivity disorder)    • Anxiety    • AV mark tachycardia (CMS/HCC) 05/2017    Reentrant ablation. Abstracted from [facility].   • Depression    • GERD (gastroesophageal reflux disease)    • History of atrial tachycardia    • History of supraventricular tachycardia     av node reentrant tachycardia s/p ablation   • Hyperlipidemia    • Hypertension    • MRSA (methicillin resistant staph aureus) culture positive     eye   • Sleep apnea      Past Surgical History:   Procedure Laterality Date   • CARDIAC ABLATION  05/2017   • CARDIAC CATHETERIZATION  2011   • COLONOSCOPY     • CORNEAL TRANSPLANT Left    • EYE SURGERY Left    • UMBILICAL HERNIA REPAIR N/A 11/18/2020    Procedure: UMBILICAL HERNIA REPAIR with mesh;  Surgeon: Fredi Kaye MD;  Location: [facility] MAIN OR;  Service: General;  Laterality: N/A;       Physical Exam  VITALS REVIEWED    General:      well developed, well nourished, in no acute distress.    Head:      normocephalic and atraumatic.    Eyes:       PERRL/EOM intact, conjunctiva and sclera clear with out nystagmus.    Neck:      no masses, thyromegaly,  trachea central with normal respiratory effort and PMI displaced laterally  Lungs:      clear bilaterally to auscultation.    Heart:      Sinus rhythm without any murmurs or gallops      Assessment plan  Vasovagal syncope and symptoms resolved after stopping losartan and patient reinforced about plenty of hydration.  Nonpharmacological clues reinforced--avoid sudafed  Patient does have apple watch recordings without any arrhythmia   Remote history of SVT with slow pathway ablation without recurrence  Mild essential hypertension  Patient does take Adderall which can inadvertently affect vasovagal symptoms which was discussed with the patient  Cardiac wise patient is stable  Follow-up in 6 months      ECG 12 Lead    Date/Time: 6/4/2021 10:58 AM  Performed by: Henry Springer MD  Authorized by: Henry Springer MD   Comparison: compared with previous ECG   Similar to previous ECG  Rhythm: sinus rhythm  Rate: normal  Conduction: conduction normal  QRS axis: normal              Electronically signed by Henry Springer MD, 06/04/21, 10:58 AM EDT.

## 2021-06-09 ENCOUNTER — OFFICE VISIT (OUTPATIENT)
Dept: FAMILY MEDICINE CLINIC | Facility: CLINIC | Age: 56
End: 2021-06-09

## 2021-06-09 VITALS
HEART RATE: 71 BPM | WEIGHT: 231 LBS | OXYGEN SATURATION: 98 % | BODY MASS INDEX: 32.34 KG/M2 | SYSTOLIC BLOOD PRESSURE: 124 MMHG | DIASTOLIC BLOOD PRESSURE: 74 MMHG | TEMPERATURE: 97.7 F | HEIGHT: 71 IN

## 2021-06-09 DIAGNOSIS — F98.8 ATTENTION DEFICIT DISORDER (ADD) WITHOUT HYPERACTIVITY: ICD-10-CM

## 2021-06-09 DIAGNOSIS — K21.9 GASTROESOPHAGEAL REFLUX DISEASE WITHOUT ESOPHAGITIS: ICD-10-CM

## 2021-06-09 DIAGNOSIS — Z81.8 FAMILY HISTORY OF DEMENTIA: ICD-10-CM

## 2021-06-09 DIAGNOSIS — R41.3 MEMORY DISTURBANCE: Primary | ICD-10-CM

## 2021-06-09 PROCEDURE — 99214 OFFICE O/P EST MOD 30 MIN: CPT | Performed by: NURSE PRACTITIONER

## 2021-06-09 RX ORDER — DEXTROAMPHETAMINE SACCHARATE, AMPHETAMINE ASPARTATE MONOHYDRATE, DEXTROAMPHETAMINE SULFATE AND AMPHETAMINE SULFATE 5; 5; 5; 5 MG/1; MG/1; MG/1; MG/1
20 CAPSULE, EXTENDED RELEASE ORAL EVERY MORNING
Qty: 30 CAPSULE | Refills: 0 | Status: SHIPPED | OUTPATIENT
Start: 2021-06-09 | End: 2021-07-22 | Stop reason: SDUPTHER

## 2021-06-09 NOTE — PROGRESS NOTES
"Subjective   Choco Bryant is a 56 y.o. male.     Chief Complaint   Patient presents with   • Heartburn   • Memory Loss       /74 (BP Location: Left arm, Patient Position: Sitting, Cuff Size: Adult)   Pulse 71   Temp 97.7 °F (36.5 °C) (Skin)   Ht 180.3 cm (71\")   Wt 105 kg (231 lb)   SpO2 98%   BMI 32.22 kg/m²     BP Readings from Last 3 Encounters:   06/09/21 124/74   06/04/21 127/76   03/04/21 114/76       Wt Readings from Last 3 Encounters:   06/09/21 105 kg (231 lb)   06/04/21 108 kg (238 lb)   03/04/21 97.1 kg (214 lb)       Pt comes in today with 2 concerns.  1. He has been on omprezole for about 2 years and had some concerns.  2. Concerned about his memory. Has strong family hx of alzheimers dementia. He is fearful that he will develop it. Sees counselor at [place]. Would like to have some testing for dementia. Does have memory issues. Paranoid about it.   Would like a referral to neuro for evaluation.        The following portions of the patient's history were reviewed and updated as appropriate: allergies, current medications, past family history, past medical history, past social history, past surgical history and problem list.    Maximum   Score Patient's   Score Questions   5 5 \"What is the year?Season?Date?Day of the week?Month?\"   5 5 \"Where are we now: State?County?Town/city?Hospital?Floor?\"   3 3 3 Unrelated objects Number of trials:___   5 5 Count backward from 100 by sevens or spell WORLD backwards   3 3 Name 3 things from above   2 2 Identify 2 objects   1 1 Repeat the phrase: No ifs, ands,or buts.   3 3 Take paper in right hand, fold it in half, and put it on the floor.   1 1 Please read this and do what it says. \"Close your eyes\"   1 1 Make up and write a sentence about anything. Noun and verb   1 1 Copy this picture 10 angles must be present.   30 30 Total MMSE       Review of Systems    Objective   Physical Exam  Constitutional:       Appearance: He is well-developed.   Eyes:      " Pupils: Pupils are equal, round, and reactive to light.   Cardiovascular:      Rate and Rhythm: Normal rate and regular rhythm.   Pulmonary:      Effort: Pulmonary effort is normal.      Breath sounds: Normal breath sounds.   Neurological:      Mental Status: He is alert and oriented to person, place, and time.           Diagnoses and all orders for this visit:    1. Memory disturbance (Primary)  -     Ambulatory Referral to Neurology    2. Family history of dementia  -     Ambulatory Referral to Neurology    3. Attention deficit disorder (ADD) without hyperactivity  -     amphetamine-dextroamphetamine XR (Adderall XR) 20 MG 24 hr capsule; Take 1 capsule by mouth Every Morning  Dispense: 30 capsule; Refill: 0    4. Gastroesophageal reflux disease without esophagitis    pt is going to stop omeprazole for a while and see how he does  Referral to neuro. MMSE score 30.   We also discussed decreasing his adderal in hopes to wean off. It does help him, but I have concerns of him taking this at his age and with his hx of SVT and vasovagal symptoms. He is in agreement and we will decrease to 20mg for now  During this office visit, we discussed the pertinent aspects of the visit and treatment recommendations. Pt verbalizes understanding. Follow up was discussed. Patient was given the opportunity to ask questions and discuss other concerns.       Return in about 6 months (around 12/9/2021) for Annual physical.

## 2021-06-28 DIAGNOSIS — G47.00 INSOMNIA, UNSPECIFIED TYPE: ICD-10-CM

## 2021-06-28 RX ORDER — CLONAZEPAM 2 MG/1
2 TABLET ORAL NIGHTLY PRN
Qty: 30 TABLET | Refills: 0 | Status: SHIPPED | OUTPATIENT
Start: 2021-06-28 | End: 2021-07-28

## 2021-07-22 DIAGNOSIS — F98.8 ATTENTION DEFICIT DISORDER (ADD) WITHOUT HYPERACTIVITY: ICD-10-CM

## 2021-07-22 RX ORDER — DEXTROAMPHETAMINE SACCHARATE, AMPHETAMINE ASPARTATE MONOHYDRATE, DEXTROAMPHETAMINE SULFATE AND AMPHETAMINE SULFATE 5; 5; 5; 5 MG/1; MG/1; MG/1; MG/1
20 CAPSULE, EXTENDED RELEASE ORAL EVERY MORNING
Qty: 30 CAPSULE | Refills: 0 | Status: SHIPPED | OUTPATIENT
Start: 2021-07-22 | End: 2021-09-30 | Stop reason: SDUPTHER

## 2021-07-25 RX ORDER — VORTIOXETINE 10 MG/1
10 TABLET, FILM COATED ORAL DAILY
Qty: 90 TABLET | Refills: 0 | Status: SHIPPED | OUTPATIENT
Start: 2021-07-25 | End: 2021-07-27

## 2021-07-27 ENCOUNTER — TELEPHONE (OUTPATIENT)
Dept: FAMILY MEDICINE CLINIC | Facility: CLINIC | Age: 56
End: 2021-07-27

## 2021-07-27 RX ORDER — VORTIOXETINE 20 MG/1
20 TABLET, FILM COATED ORAL DAILY
Qty: 90 TABLET | Refills: 1 | Status: SHIPPED | OUTPATIENT
Start: 2021-07-27 | End: 2022-01-02 | Stop reason: SDUPTHER

## 2021-07-27 RX ORDER — VORTIOXETINE 10 MG/1
10 TABLET, FILM COATED ORAL DAILY
Qty: 90 TABLET | Refills: 0 | Status: CANCELLED | OUTPATIENT
Start: 2021-07-27

## 2021-07-27 NOTE — TELEPHONE ENCOUNTER
Caller: Choco Bryant    Relationship: Self    Best call back number: [phone]    Medication needed:   Requested Prescriptions     Pending Prescriptions Disp Refills   • Vortioxetine HBr (Trintellix) 10 MG tablet 90 tablet 0     Sig: Take 10 mg by mouth Daily.       When do you need the refill by: 07/27/21    What additional details did the patient provide when requesting the medication: PATIENT IS OUT OF MEDICATION    THE PATIENT SAID THIS WAS SENT TO A PHARMACY IN [place] A COUPLE DAYS AGO BUT HE IS IN [place]     HE WANTS IT SENT TO THE [facility] ON [address]     ALSO SAID HE THAT HE WAS TAKING 20 MG OF TRINTELLIX AND WAS UNDER THE IMPRESSION IT WAS NOT CHANGING, BUT THAT IT WAS FILLED FOR 10 MG A DAY    THE PATIENT SAID HE IS ALSO OK WITH COMING IN FOR AN APPOINTMENT IF JERROD DYER NEEDS TO SEE HIM FOR THE TRINTELLIX     Does the patient have less than a 3 day supply:  [x] Yes  [] No    What is the patient's preferred pharmacy: [id number] - [facility] [address] AT [address] & [address] [facility] [phone] [address] [phone]

## 2021-07-28 DIAGNOSIS — G47.00 INSOMNIA, UNSPECIFIED TYPE: ICD-10-CM

## 2021-07-28 RX ORDER — CLONAZEPAM 2 MG/1
2 TABLET ORAL NIGHTLY PRN
Qty: 30 TABLET | Refills: 0 | Status: SHIPPED | OUTPATIENT
Start: 2021-07-28 | End: 2021-08-26

## 2021-08-25 DIAGNOSIS — G47.00 INSOMNIA, UNSPECIFIED TYPE: ICD-10-CM

## 2021-08-26 RX ORDER — CLONAZEPAM 2 MG/1
2 TABLET ORAL NIGHTLY PRN
Qty: 30 TABLET | Refills: 1 | Status: SHIPPED | OUTPATIENT
Start: 2021-08-26 | End: 2021-11-03

## 2021-09-10 RX ORDER — TAMSULOSIN HYDROCHLORIDE 0.4 MG/1
1 CAPSULE ORAL DAILY
Qty: 90 CAPSULE | Refills: 1 | Status: SHIPPED | OUTPATIENT
Start: 2021-09-10 | End: 2022-03-04

## 2021-09-10 RX ORDER — SILDENAFIL CITRATE 20 MG/1
20-100 TABLET ORAL DAILY PRN
Qty: 50 TABLET | Refills: 1 | Status: SHIPPED | OUTPATIENT
Start: 2021-09-10 | End: 2021-09-20 | Stop reason: SDUPTHER

## 2021-09-10 RX ORDER — ATORVASTATIN CALCIUM 20 MG/1
20 TABLET, FILM COATED ORAL DAILY
Qty: 90 TABLET | Refills: 1 | Status: SHIPPED | OUTPATIENT
Start: 2021-09-10 | End: 2022-03-04

## 2021-09-21 RX ORDER — SILDENAFIL CITRATE 20 MG/1
20-100 TABLET ORAL DAILY PRN
Qty: 50 TABLET | Refills: 1 | Status: SHIPPED | OUTPATIENT
Start: 2021-09-21 | End: 2021-09-21

## 2021-09-21 RX ORDER — SILDENAFIL CITRATE 20 MG/1
TABLET ORAL
Qty: 150 TABLET | Refills: 1 | Status: SHIPPED | OUTPATIENT
Start: 2021-09-21

## 2021-09-30 DIAGNOSIS — F98.8 ATTENTION DEFICIT DISORDER (ADD) WITHOUT HYPERACTIVITY: ICD-10-CM

## 2021-09-30 RX ORDER — DEXTROAMPHETAMINE SACCHARATE, AMPHETAMINE ASPARTATE MONOHYDRATE, DEXTROAMPHETAMINE SULFATE AND AMPHETAMINE SULFATE 5; 5; 5; 5 MG/1; MG/1; MG/1; MG/1
20 CAPSULE, EXTENDED RELEASE ORAL EVERY MORNING
Qty: 30 CAPSULE | Refills: 0 | Status: SHIPPED | OUTPATIENT
Start: 2021-09-30 | End: 2021-12-01 | Stop reason: SDUPTHER

## 2021-10-20 NOTE — PROGRESS NOTES
Chief Complaint  Memory Loss    Subjective            Choco Byrant presents to [facility] for dementia  History of Present Illness     New patient referred by Jacinta ARSHAD for memory  Patient has family history of dementia  Patient always had poor memory, he was told to come in for a base line.     He never had a good memory but feels his memory is not as good as it used to be, No other cognitive problems    His father and paternal grandfather  of demential in their late 70's onset in early 60's.  Mother side of family no history of dementia    He scored 30 on 2021 Mini Mai at visit with Jacinta ARSHAD    He has mild laquita and has been using cpap since 2017  Will review scr when available  ---------------------------------------   Concerned about his memory. Has strong family hx of alzheimers dementia. He is fearful that he will develop it. Sees counselor at [place]. Would like to have some testing for dementia. Does have memory issues. Paranoid about it.       ----------------npsg 2017 ahi 12, 51 during rem sleep------  ---------------------------------------------2017---------------  History of Present Illness:   This is a 52 years old male who presents with possible LAQUITA.     New pt. referred by pcp, neck measures 17 inches.    Pt. has gained 25 pounds in the past year.   Pt. c/o problems with getting and staying asleep.  The patient complains of snoring per wife and excessive daytime somnolence, but denies observed apnea, previous dx of LAQUITA, sleep walking, sleep talking, sleep eating, morning headache, morning dry mouth,   awakening with gasping and awakening with difficulty breathing.  The patient has the following risk factors recent weight gain.     The patient also has insomnia.  The patient complains of insomnia and snoring.  Associated symptoms include weight gain and depression.  Insomnia has led to problems with memory loss, impaired judgement and fatigue.   Risk factors for insomnia include   obesity and caffeine use.  Adverse behaviors that may contribute to insomnia include watches TV in bed and pets in bedroom.    ------------------------------------------------------------       CT HEAD WO CONTRAST-   Date of Exam: 2/18/2021 11:01 AM   Indication: Dizziness for 3 months. Near-syncope this morning.  Hypertension..   Comparison: None available.   Technique:  Without contrast, contiguous axial CT images of the head  were obtained from skull base to vertex.  Coronal and sagittal  reconstructions were performed.  Automated exposure control and  iterative reconstruction methods were used.   FINDINGS  No acute intracranial hemorrhage, mass lesion, mass effect or midline  shift or evidence of acute or evolving infarct. Ventricular  configuration is within normal limits. No calvarial abnormality is  identified. Mild right anterior ethmoid sinus mucosal thickening, and  signs of prior ethmoid sinus surgery.   IMPRESSION:  No acute intracranial finding.   Electronically Signed By-Eugenie Gillette MD On:2/18/2021 11:13 AM    Family History   Problem Relation Age of Onset   • Diabetes Mother    • Glaucoma Mother    • Brain cancer Mother    • Heart disease Mother    • Coronary artery disease Mother    • Hypertension Father    • Depression Father    • Hyperlipidemia Father    • Alzheimer's disease Father    • Glaucoma Sister    • Other Brother         MVA    • Narcolepsy Son    • Heart disease Other    • Congenital heart disease Brother        Past Medical History:   Diagnosis Date   • ADHD (attention deficit hyperactivity disorder)    • Anxiety    • AV mark tachycardia (HCC) 05/2017    Reentrant ablation. Abstracted from [facility].   • Depression    • GERD (gastroesophageal reflux disease)    • History of atrial tachycardia    • History of supraventricular tachycardia     av node reentrant tachycardia s/p ablation   • Hyperlipidemia    • Hypertension    • MRSA (methicillin  resistant staph aureus) culture positive     eye   • Sleep apnea        Social History     Socioeconomic History   • Marital status:    • Number of children: 2   Tobacco Use   • Smoking status: Never Smoker   • Smokeless tobacco: Never Used   Vaping Use   • Vaping Use: Never used   Substance and Sexual Activity   • Alcohol use: Yes     Comment: Occasional   • Drug use: No   • Sexual activity: Defer         Current Outpatient Medications:   •  amLODIPine (NORVASC) 10 MG tablet, Take 1 tablet by mouth Daily., Disp: 90 tablet, Rfl: 0  •  amphetamine-dextroamphetamine XR (Adderall XR) 20 MG 24 hr capsule, Take 1 capsule by mouth Every Morning, Disp: 30 capsule, Rfl: 0  •  atorvastatin (LIPITOR) 20 MG tablet, Take 1 tablet by mouth Daily., Disp: 90 tablet, Rfl: 1  •  clonazePAM (KlonoPIN) 2 MG tablet, TAKE 1 TABLET BY MOUTH AT NIGHT AS NEEDED FOR ANXIETY, Disp: 30 tablet, Rfl: 1  •  fluorometholone (FML) 0.1 % ophthalmic suspension, Administer 1 drop into the left eye 2 (Two) Times a Day., Disp: , Rfl:   •  loratadine (CLARITIN) 10 MG tablet, Take 10 mg by mouth Daily., Disp: , Rfl:   •  omeprazole (priLOSEC) 20 MG capsule, Take 20 mg by mouth Daily., Disp: , Rfl:   •  prednisoLONE acetate (PRED FORTE) 1 % ophthalmic suspension, , Disp: , Rfl:   •  sildenafil (REVATIO) 20 MG tablet, TAKE 1-5 TABLET BY MOUTH DAILY AS NEEDED, Disp: 150 tablet, Rfl: 1  •  tamsulosin (FLOMAX) 0.4 MG capsule 24 hr capsule, Take 1 capsule by mouth Daily., Disp: 90 capsule, Rfl: 1  •  vitamin E 1000 UNIT capsule, Take 900 Units by mouth Daily., Disp: , Rfl:   •  Vortioxetine HBr (Trintellix) 20 MG tablet, Take 20 mg by mouth Daily., Disp: 90 tablet, Rfl: 1    Review of Systems   Constitutional: Negative for fatigue and fever.   HENT: Negative for ear discharge and ear pain.    Eyes: Negative for pain and itching.   Respiratory: Negative for cough and shortness of breath.    Cardiovascular: Negative for chest pain.   Gastrointestinal:  "Negative for abdominal pain and nausea.   Endocrine: Negative for cold intolerance and heat intolerance.   Genitourinary: Negative for urgency.   Musculoskeletal: Positive for back pain and neck pain.   Neurological: Positive for dizziness. Negative for light-headedness.   Psychiatric/Behavioral: Negative for agitation, confusion and sleep disturbance.            Objective   Vital Signs:   /79 (BP Location: Right arm, Patient Position: Sitting, Cuff Size: Adult)   Pulse 62   Temp 97.2 °F (36.2 °C)   Ht 180.3 cm (71\")   Wt 105 kg (231 lb)   BMI 32.22 kg/m²     Physical Exam  Vitals reviewed.   Constitutional:       Appearance: Normal appearance.   HENT:      Head: Normocephalic.      Nose: Nose normal.      Mouth/Throat:      Mouth: Mucous membranes are moist.   Eyes:      Extraocular Movements: Extraocular movements intact.      Pupils: Pupils are equal, round, and reactive to light.   Cardiovascular:      Rate and Rhythm: Normal rate.      Heart sounds: No murmur heard.      Pulmonary:      Effort: Pulmonary effort is normal. No respiratory distress.   Musculoskeletal:      Right lower leg: No edema.      Left lower leg: No edema.   Skin:     General: Skin is warm and dry.   Neurological:      General: No focal deficit present.      Mental Status: He is alert and oriented to person, place, and time.      Coordination: Finger-Nose-Finger Test and Romberg Test normal.      Gait: Gait is intact. Tandem walk normal.      Deep Tendon Reflexes: Strength normal.   Psychiatric:         Mood and Affect: Mood normal.         Behavior: Behavior normal.        Result Review :                Neurologic Exam     Mental Status   Oriented to person, place, and time.   Attention: normal.   Level of consciousness: alert  Knowledge: good.     Cranial Nerves   Cranial nerves II through XII intact.     CN III, IV, VI   Pupils are equal, round, and reactive to light.    Motor Exam   Muscle bulk: normal    Strength   Strength " 5/5 throughout.     Sensory Exam   Light touch normal.     Gait, Coordination, and Reflexes     Gait  Gait: normal    Coordination   Romberg: negative  Finger to nose coordination: normal  Tandem walking coordination: normal    Tremor   Resting tremor: absent  Intention tremor: absent  Action tremor: absent    Reflexes   Reflexes 2+ except as noted.              Assessment and Plan    Diagnoses and all orders for this visit:    1. LAQUITA (obstructive sleep apnea) (Primary)    2. Insomnia, unspecified type    3. Memory loss  -     MRI Brain With & Without Contrast; Future  -     Vitamin B12; Future  -     Folate; Future  -     Vitamin D 1,25 Dihydroxy; Future  -     Vitamin E; Future      Continue CPAP will obtain scr and adjust pressure as needed    obtain mri brain and vitamin levels    Recommend, weight management. Lose weight to normal range  Recommend the MIND diet and aerobic and strengthening exercises  Reevaluate in one year, re laquita and memory    Follow Up   Return in about 1 year (around 10/25/2022).  Patient was given instructions and counseling regarding his condition or for health maintenance advice. Please see specific information pulled into the AVS if appropriate.         This document has been electronically signed by Joseph Seipel, MD on October 25, 2021 10:06 EDT

## 2021-10-25 ENCOUNTER — OFFICE VISIT (OUTPATIENT)
Dept: NEUROLOGY | Facility: CLINIC | Age: 56
End: 2021-10-25

## 2021-10-25 VITALS
HEART RATE: 62 BPM | WEIGHT: 231 LBS | DIASTOLIC BLOOD PRESSURE: 79 MMHG | HEIGHT: 71 IN | BODY MASS INDEX: 32.34 KG/M2 | TEMPERATURE: 97.2 F | SYSTOLIC BLOOD PRESSURE: 129 MMHG

## 2021-10-25 DIAGNOSIS — G47.00 INSOMNIA, UNSPECIFIED TYPE: Chronic | ICD-10-CM

## 2021-10-25 DIAGNOSIS — R41.3 MEMORY LOSS: Primary | ICD-10-CM

## 2021-10-25 DIAGNOSIS — G47.33 OSA (OBSTRUCTIVE SLEEP APNEA): Chronic | ICD-10-CM

## 2021-10-25 PROCEDURE — 99214 OFFICE O/P EST MOD 30 MIN: CPT | Performed by: PSYCHIATRY & NEUROLOGY

## 2021-10-25 RX ORDER — AMLODIPINE BESYLATE 10 MG/1
10 TABLET ORAL DAILY
Qty: 90 TABLET | Refills: 0 | Status: SHIPPED | OUTPATIENT
Start: 2021-10-25 | End: 2022-03-04

## 2021-10-25 RX ORDER — PREDNISOLONE ACETATE 10 MG/ML
SUSPENSION/ DROPS OPHTHALMIC
COMMUNITY
Start: 2021-10-22 | End: 2022-07-14

## 2021-11-02 DIAGNOSIS — G47.00 INSOMNIA, UNSPECIFIED TYPE: ICD-10-CM

## 2021-11-03 RX ORDER — CLONAZEPAM 2 MG/1
2 TABLET ORAL NIGHTLY PRN
Qty: 30 TABLET | Refills: 0 | Status: SHIPPED | OUTPATIENT
Start: 2021-11-03 | End: 2021-12-01

## 2021-11-17 ENCOUNTER — LAB (OUTPATIENT)
Dept: LAB | Facility: HOSPITAL | Age: 56
End: 2021-11-17

## 2021-11-17 DIAGNOSIS — R41.3 MEMORY LOSS: ICD-10-CM

## 2021-11-17 LAB
25(OH)D3 SERPL-MCNC: 22.4 NG/ML
FOLATE SERPL-MCNC: 12.4 NG/ML (ref 4.78–24.2)
VIT B12 BLD-MCNC: 425 PG/ML (ref 211–946)

## 2021-11-17 PROCEDURE — 84446 ASSAY OF VITAMIN E: CPT

## 2021-11-17 PROCEDURE — 82306 VITAMIN D 25 HYDROXY: CPT

## 2021-11-17 PROCEDURE — 82746 ASSAY OF FOLIC ACID SERUM: CPT

## 2021-11-17 PROCEDURE — 36415 COLL VENOUS BLD VENIPUNCTURE: CPT

## 2021-11-17 PROCEDURE — 82607 VITAMIN B-12: CPT

## 2021-11-18 RX ORDER — ERGOCALCIFEROL 1.25 MG/1
50000 CAPSULE ORAL WEEKLY
Qty: 6 CAPSULE | Refills: 0 | Status: SHIPPED | OUTPATIENT
Start: 2021-11-18

## 2021-11-19 RX ORDER — ERGOCALCIFEROL 1.25 MG/1
CAPSULE ORAL
Qty: 12 CAPSULE | Refills: 0 | OUTPATIENT
Start: 2021-11-19

## 2021-11-23 ENCOUNTER — APPOINTMENT (OUTPATIENT)
Dept: MRI IMAGING | Facility: HOSPITAL | Age: 56
End: 2021-11-23

## 2021-11-24 LAB
A-TOCOPHEROL VIT E SERPL-MCNC: 25.6 MG/L (ref 7–25.1)
GAMMA TOCOPHEROL SERPL-MCNC: 0.3 MG/L (ref 0.5–5.5)

## 2021-11-26 ENCOUNTER — APPOINTMENT (OUTPATIENT)
Dept: MRI IMAGING | Facility: HOSPITAL | Age: 56
End: 2021-11-26

## 2021-12-01 DIAGNOSIS — F98.8 ATTENTION DEFICIT DISORDER (ADD) WITHOUT HYPERACTIVITY: ICD-10-CM

## 2021-12-01 DIAGNOSIS — G47.00 INSOMNIA, UNSPECIFIED TYPE: ICD-10-CM

## 2021-12-01 RX ORDER — CLONAZEPAM 2 MG/1
2 TABLET ORAL NIGHTLY PRN
Qty: 30 TABLET | Refills: 0 | Status: SHIPPED | OUTPATIENT
Start: 2021-12-01 | End: 2022-01-02 | Stop reason: SDUPTHER

## 2021-12-01 RX ORDER — DEXTROAMPHETAMINE SACCHARATE, AMPHETAMINE ASPARTATE MONOHYDRATE, DEXTROAMPHETAMINE SULFATE AND AMPHETAMINE SULFATE 5; 5; 5; 5 MG/1; MG/1; MG/1; MG/1
20 CAPSULE, EXTENDED RELEASE ORAL EVERY MORNING
Qty: 30 CAPSULE | Refills: 0 | Status: SHIPPED | OUTPATIENT
Start: 2021-12-01 | End: 2021-12-09

## 2021-12-06 ENCOUNTER — OFFICE VISIT (OUTPATIENT)
Dept: CARDIOLOGY | Facility: CLINIC | Age: 56
End: 2021-12-06

## 2021-12-06 VITALS
HEART RATE: 72 BPM | DIASTOLIC BLOOD PRESSURE: 74 MMHG | HEIGHT: 71 IN | BODY MASS INDEX: 33.18 KG/M2 | SYSTOLIC BLOOD PRESSURE: 127 MMHG | OXYGEN SATURATION: 99 % | WEIGHT: 237 LBS

## 2021-12-06 DIAGNOSIS — I10 ESSENTIAL HYPERTENSION: ICD-10-CM

## 2021-12-06 DIAGNOSIS — R55 VASOVAGAL SYNCOPE: ICD-10-CM

## 2021-12-06 DIAGNOSIS — I47.1 PAROXYSMAL SVT (SUPRAVENTRICULAR TACHYCARDIA) (HCC): ICD-10-CM

## 2021-12-06 DIAGNOSIS — R00.2 PALPITATIONS: Primary | ICD-10-CM

## 2021-12-06 PROCEDURE — 99213 OFFICE O/P EST LOW 20 MIN: CPT | Performed by: INTERNAL MEDICINE

## 2021-12-06 PROCEDURE — 93000 ELECTROCARDIOGRAM COMPLETE: CPT | Performed by: INTERNAL MEDICINE

## 2021-12-06 NOTE — PROGRESS NOTES
CC--vasovagal syncope, essential hypertension and remote history of SVT ablation    Sub== 56 old male patient had a history of incessant SVT and underwent slow pathway ablation several years ago with complete resolution of symptoms.  In the last several months has been having recurrent episodes of syncope.  Syncope usually happens early in the morning with premonitory symptoms of flushing in the face and post syncope has significant fatigue lasting for hours.  He denies any urinary incontinence or any rapid palpitations.  He had total of 4 episodes which prompted recent admission to the hospital .  He denies recurrent symptoms of arrhythmias.  He has treated sleep apnea  Patient symptoms and history was consistent with vasovagal syncope and patient was taking losartan which has been stopped and he denies any recurrent symptoms  Denies any recurrent symptoms of syncope   No palpitations        Past Medical History:   Diagnosis Date   • ADHD (attention deficit hyperactivity disorder)    • Anxiety    • AV mark tachycardia (CMS/HCC) 05/2017    Reentrant ablation. Abstracted from [facility].   • Depression    • GERD (gastroesophageal reflux disease)    • History of atrial tachycardia    • History of supraventricular tachycardia     av node reentrant tachycardia s/p ablation   • Hyperlipidemia    • Hypertension    • MRSA (methicillin resistant staph aureus) culture positive     eye   • Sleep apnea      Past Surgical History:   Procedure Laterality Date   • CARDIAC ABLATION  05/2017   • CARDIAC CATHETERIZATION  2011   • COLONOSCOPY     • CORNEAL TRANSPLANT Left    • EYE SURGERY Left    • UMBILICAL HERNIA REPAIR N/A 11/18/2020    Procedure: UMBILICAL HERNIA REPAIR with mesh;  Surgeon: Fredi Kaye MD;  Location: [facility] MAIN OR;  Service: General;  Laterality: N/A;       Physical Exam  VITALS REVIEWED    General:      well developed, well nourished, in no acute distress.    Head:      normocephalic and atraumatic.     Eyes:      PERRL/EOM intact, conjunctiva and sclera clear with out nystagmus.    Neck:      no masses, thyromegaly,  trachea central with normal respiratory effort and PMI displaced laterally  Lungs:      clear bilaterally to auscultation.    Heart:      Sinus rhythm without any murmurs or gallops      Assessment plan  Vasovagal syncope and symptoms resolved after stopping losartan and patient reinforced about plenty of hydration.  Nonpharmacological clues reinforced--avoid sudafed  Patient does have apple watch recordings without any arrhythmia   Remote history of SVT with slow pathway ablation without recurrence  Mild essential hypertension  Patient does take Adderall which can inadvertently affect vasovagal symptoms which was discussed with the patient  Cardiac wise patient is stable  Follow-up in 6 months  meds reviewed      ECG 12 Lead    Date/Time: 12/6/2021 11:30 AM  Performed by: Henry Springer MD  Authorized by: Henry Springer MD   Comparison: compared with previous ECG   Similar to previous ECG  Rhythm: sinus rhythm  Rate: normal  Conduction: conduction normal  QRS axis: normal              Electronically signed by Henry Springer MD, 12/06/21, 11:30 AM EST.

## 2021-12-09 ENCOUNTER — OFFICE VISIT (OUTPATIENT)
Dept: FAMILY MEDICINE CLINIC | Facility: CLINIC | Age: 56
End: 2021-12-09

## 2021-12-09 ENCOUNTER — LAB (OUTPATIENT)
Dept: FAMILY MEDICINE CLINIC | Facility: CLINIC | Age: 56
End: 2021-12-09

## 2021-12-09 VITALS
SYSTOLIC BLOOD PRESSURE: 136 MMHG | HEIGHT: 71 IN | TEMPERATURE: 99.3 F | WEIGHT: 238 LBS | BODY MASS INDEX: 33.32 KG/M2 | HEART RATE: 85 BPM | OXYGEN SATURATION: 96 % | DIASTOLIC BLOOD PRESSURE: 84 MMHG

## 2021-12-09 DIAGNOSIS — F98.8 ATTENTION DEFICIT DISORDER (ADD) WITHOUT HYPERACTIVITY: ICD-10-CM

## 2021-12-09 DIAGNOSIS — G89.29 CHRONIC BILATERAL LOW BACK PAIN WITHOUT SCIATICA: ICD-10-CM

## 2021-12-09 DIAGNOSIS — Z00.00 PREVENTATIVE HEALTH CARE: Primary | ICD-10-CM

## 2021-12-09 DIAGNOSIS — Z12.5 SCREENING FOR PROSTATE CANCER: ICD-10-CM

## 2021-12-09 DIAGNOSIS — Z00.00 PREVENTATIVE HEALTH CARE: ICD-10-CM

## 2021-12-09 DIAGNOSIS — N40.1 BENIGN PROSTATIC HYPERPLASIA (BPH) WITH STRAINING ON URINATION: ICD-10-CM

## 2021-12-09 DIAGNOSIS — M54.50 CHRONIC BILATERAL LOW BACK PAIN WITHOUT SCIATICA: ICD-10-CM

## 2021-12-09 DIAGNOSIS — Z12.11 SCREENING FOR COLON CANCER: ICD-10-CM

## 2021-12-09 DIAGNOSIS — R39.16 BENIGN PROSTATIC HYPERPLASIA (BPH) WITH STRAINING ON URINATION: ICD-10-CM

## 2021-12-09 DIAGNOSIS — I10 ESSENTIAL HYPERTENSION: Chronic | ICD-10-CM

## 2021-12-09 DIAGNOSIS — Z23 IMMUNIZATION DUE: ICD-10-CM

## 2021-12-09 PROCEDURE — 80061 LIPID PANEL: CPT | Performed by: NURSE PRACTITIONER

## 2021-12-09 PROCEDURE — 99396 PREV VISIT EST AGE 40-64: CPT | Performed by: NURSE PRACTITIONER

## 2021-12-09 PROCEDURE — 83036 HEMOGLOBIN GLYCOSYLATED A1C: CPT | Performed by: NURSE PRACTITIONER

## 2021-12-09 PROCEDURE — G0103 PSA SCREENING: HCPCS | Performed by: NURSE PRACTITIONER

## 2021-12-09 PROCEDURE — 99213 OFFICE O/P EST LOW 20 MIN: CPT | Performed by: NURSE PRACTITIONER

## 2021-12-09 PROCEDURE — 90471 IMMUNIZATION ADMIN: CPT | Performed by: NURSE PRACTITIONER

## 2021-12-09 PROCEDURE — 80053 COMPREHEN METABOLIC PANEL: CPT | Performed by: NURSE PRACTITIONER

## 2021-12-09 PROCEDURE — 85025 COMPLETE CBC W/AUTO DIFF WBC: CPT | Performed by: NURSE PRACTITIONER

## 2021-12-09 PROCEDURE — 36415 COLL VENOUS BLD VENIPUNCTURE: CPT

## 2021-12-09 PROCEDURE — 90715 TDAP VACCINE 7 YRS/> IM: CPT | Performed by: NURSE PRACTITIONER

## 2021-12-09 PROCEDURE — 84443 ASSAY THYROID STIM HORMONE: CPT | Performed by: NURSE PRACTITIONER

## 2021-12-09 RX ORDER — DICLOFENAC SODIUM 75 MG/1
75 TABLET, DELAYED RELEASE ORAL 2 TIMES DAILY
Qty: 60 TABLET | Refills: 1 | Status: SHIPPED | OUTPATIENT
Start: 2021-12-09 | End: 2022-02-07

## 2021-12-09 RX ORDER — DEXTROAMPHETAMINE SACCHARATE, AMPHETAMINE ASPARTATE MONOHYDRATE, DEXTROAMPHETAMINE SULFATE AND AMPHETAMINE SULFATE 2.5; 2.5; 2.5; 2.5 MG/1; MG/1; MG/1; MG/1
10 CAPSULE, EXTENDED RELEASE ORAL EVERY MORNING
Qty: 30 CAPSULE | Refills: 0 | Status: SHIPPED | OUTPATIENT
Start: 2021-12-09 | End: 2022-01-22 | Stop reason: SDUPTHER

## 2021-12-09 RX ORDER — OMEPRAZOLE 40 MG/1
40 CAPSULE, DELAYED RELEASE ORAL DAILY
Qty: 90 CAPSULE | Refills: 1 | Status: SHIPPED | OUTPATIENT
Start: 2021-12-09 | End: 2022-06-02

## 2021-12-10 LAB
ALBUMIN SERPL-MCNC: 4.3 G/DL (ref 3.5–5.2)
ALBUMIN/GLOB SERPL: 1.5 G/DL
ALP SERPL-CCNC: 103 U/L (ref 39–117)
ALT SERPL W P-5'-P-CCNC: 23 U/L (ref 1–41)
ANION GAP SERPL CALCULATED.3IONS-SCNC: 10.2 MMOL/L (ref 5–15)
AST SERPL-CCNC: 18 U/L (ref 1–40)
BASOPHILS # BLD AUTO: 0.05 10*3/MM3 (ref 0–0.2)
BASOPHILS NFR BLD AUTO: 0.7 % (ref 0–1.5)
BILIRUB SERPL-MCNC: 0.4 MG/DL (ref 0–1.2)
BUN SERPL-MCNC: 12 MG/DL (ref 6–20)
BUN/CREAT SERPL: 12.2 (ref 7–25)
CALCIUM SPEC-SCNC: 9.6 MG/DL (ref 8.6–10.5)
CHLORIDE SERPL-SCNC: 99 MMOL/L (ref 98–107)
CHOLEST SERPL-MCNC: 182 MG/DL (ref 0–200)
CO2 SERPL-SCNC: 28.8 MMOL/L (ref 22–29)
CREAT SERPL-MCNC: 0.98 MG/DL (ref 0.76–1.27)
DEPRECATED RDW RBC AUTO: 41.7 FL (ref 37–54)
EOSINOPHIL # BLD AUTO: 0.11 10*3/MM3 (ref 0–0.4)
EOSINOPHIL NFR BLD AUTO: 1.6 % (ref 0.3–6.2)
ERYTHROCYTE [DISTWIDTH] IN BLOOD BY AUTOMATED COUNT: 12.7 % (ref 12.3–15.4)
GFR SERPL CREATININE-BSD FRML MDRD: 79 ML/MIN/1.73
GLOBULIN UR ELPH-MCNC: 2.8 GM/DL
GLUCOSE SERPL-MCNC: 82 MG/DL (ref 65–99)
HBA1C MFR BLD: 5.3 % (ref 3.5–5.6)
HCT VFR BLD AUTO: 42.8 % (ref 37.5–51)
HDLC SERPL-MCNC: 44 MG/DL (ref 40–60)
HGB BLD-MCNC: 14.5 G/DL (ref 13–17.7)
IMM GRANULOCYTES # BLD AUTO: 0.07 10*3/MM3 (ref 0–0.05)
IMM GRANULOCYTES NFR BLD AUTO: 1 % (ref 0–0.5)
LDLC SERPL CALC-MCNC: 100 MG/DL (ref 0–100)
LDLC/HDLC SERPL: 2.11 {RATIO}
LYMPHOCYTES # BLD AUTO: 1.66 10*3/MM3 (ref 0.7–3.1)
LYMPHOCYTES NFR BLD AUTO: 24 % (ref 19.6–45.3)
MCH RBC QN AUTO: 30.4 PG (ref 26.6–33)
MCHC RBC AUTO-ENTMCNC: 33.9 G/DL (ref 31.5–35.7)
MCV RBC AUTO: 89.7 FL (ref 79–97)
MONOCYTES # BLD AUTO: 0.64 10*3/MM3 (ref 0.1–0.9)
MONOCYTES NFR BLD AUTO: 9.2 % (ref 5–12)
NEUTROPHILS NFR BLD AUTO: 4.4 10*3/MM3 (ref 1.7–7)
NEUTROPHILS NFR BLD AUTO: 63.5 % (ref 42.7–76)
NRBC BLD AUTO-RTO: 0 /100 WBC (ref 0–0.2)
PLATELET # BLD AUTO: 259 10*3/MM3 (ref 140–450)
PMV BLD AUTO: 11.3 FL (ref 6–12)
POTASSIUM SERPL-SCNC: 4.3 MMOL/L (ref 3.5–5.2)
PROT SERPL-MCNC: 7.1 G/DL (ref 6–8.5)
PSA SERPL-MCNC: 1.4 NG/ML (ref 0–4)
RBC # BLD AUTO: 4.77 10*6/MM3 (ref 4.14–5.8)
SODIUM SERPL-SCNC: 138 MMOL/L (ref 136–145)
TRIGL SERPL-MCNC: 225 MG/DL (ref 0–150)
TSH SERPL DL<=0.05 MIU/L-ACNC: 1.51 UIU/ML (ref 0.27–4.2)
VLDLC SERPL-MCNC: 38 MG/DL (ref 5–40)
WBC NRBC COR # BLD: 6.93 10*3/MM3 (ref 3.4–10.8)

## 2021-12-10 NOTE — PROGRESS NOTES
Trigs were elevated at 225 (should be below 150). Work on diet and try to avoid fried/fatty foods. Otherwise labs looked good.

## 2021-12-13 ENCOUNTER — HOSPITAL ENCOUNTER (OUTPATIENT)
Dept: MRI IMAGING | Facility: HOSPITAL | Age: 56
Discharge: HOME | End: 2021-12-13
Admitting: PSYCHIATRY & NEUROLOGY

## 2021-12-13 DIAGNOSIS — R41.3 MEMORY LOSS: ICD-10-CM

## 2021-12-13 PROCEDURE — 70553 MRI BRAIN STEM W/O & W/DYE: CPT

## 2021-12-13 PROCEDURE — 25010000002 GADOTERIDOL PER 1 ML: Performed by: PSYCHIATRY & NEUROLOGY

## 2021-12-13 PROCEDURE — A9579 GAD-BASE MR CONTRAST NOS,1ML: HCPCS | Performed by: PSYCHIATRY & NEUROLOGY

## 2021-12-13 RX ADMIN — GADOTERIDOL 20 ML: 279.3 INJECTION, SOLUTION INTRAVENOUS at 11:58

## 2022-01-02 DIAGNOSIS — G47.00 INSOMNIA, UNSPECIFIED TYPE: ICD-10-CM

## 2022-01-02 RX ORDER — VORTIOXETINE 20 MG/1
20 TABLET, FILM COATED ORAL DAILY
Qty: 90 TABLET | Refills: 1 | Status: SHIPPED | OUTPATIENT
Start: 2022-01-02 | End: 2022-05-19 | Stop reason: SDUPTHER

## 2022-01-03 RX ORDER — CLONAZEPAM 2 MG/1
2 TABLET ORAL NIGHTLY PRN
Qty: 30 TABLET | Refills: 0 | Status: SHIPPED | OUTPATIENT
Start: 2022-01-03 | End: 2022-02-07 | Stop reason: SDUPTHER

## 2022-01-22 DIAGNOSIS — F98.8 ATTENTION DEFICIT DISORDER (ADD) WITHOUT HYPERACTIVITY: ICD-10-CM

## 2022-01-24 RX ORDER — DEXTROAMPHETAMINE SACCHARATE, AMPHETAMINE ASPARTATE MONOHYDRATE, DEXTROAMPHETAMINE SULFATE AND AMPHETAMINE SULFATE 2.5; 2.5; 2.5; 2.5 MG/1; MG/1; MG/1; MG/1
10 CAPSULE, EXTENDED RELEASE ORAL EVERY MORNING
Qty: 30 CAPSULE | Refills: 0 | Status: SHIPPED | OUTPATIENT
Start: 2022-01-24 | End: 2022-07-14

## 2022-02-07 DIAGNOSIS — G47.00 INSOMNIA, UNSPECIFIED TYPE: ICD-10-CM

## 2022-02-07 RX ORDER — CLONAZEPAM 2 MG/1
2 TABLET ORAL NIGHTLY PRN
Qty: 30 TABLET | Refills: 0 | Status: SHIPPED | OUTPATIENT
Start: 2022-02-07 | End: 2022-03-09 | Stop reason: SDUPTHER

## 2022-02-17 ENCOUNTER — PATIENT MESSAGE (OUTPATIENT)
Dept: FAMILY MEDICINE CLINIC | Facility: CLINIC | Age: 57
End: 2022-02-17

## 2022-02-17 DIAGNOSIS — Z00.00 PREVENTATIVE HEALTH CARE: Primary | ICD-10-CM

## 2022-02-17 NOTE — TELEPHONE ENCOUNTER
From: Choco Bryant  To: PAVITHRA Merrill  Sent: 2/17/2022 1:56 PM EST  Subject: Referrals     Milton Workman now my insurance requires referrals to see. “specialist and other doctors” . Would it be possible for you to send a referral to the insurance company for Yobany Swain for follow up visits from my eye surgery and one to them for [facility] for chiropractor visits.     Thank you for your time and you have a blessed day.     Choco

## 2022-02-28 ENCOUNTER — TELEPHONE (OUTPATIENT)
Dept: NEUROLOGY | Facility: CLINIC | Age: 57
End: 2022-02-28

## 2022-02-28 NOTE — TELEPHONE ENCOUNTER
Caller: Choco Bryant     Relationship: SELF    Best call back number: [phone]    Can the office call and leave a detailed voicemail regarding the call: [x]Yes []No    Do you have an active MyChart: [x]Yes []No   If yes, can the office send a response through [employer] regarding the recall: [x]Yes []No    Have you registered the machine with [employer]: []Yes [x]No    If NO, you will need to register the machine first, once you have it registered you can give us a call back- PROVIDED TO [PT. ADVISED HE MUST REGISTER AND THEN CALL US BACK TO CONFIRM. OFFERED [facility] PHONE # & WEBSITE; PT DECLINED AS HE ALREADY RECEIVED FROM [facility].      Luc phone number: [phone]   Luc website: [email or web address]    How old is the current machine: 5+ YEARS    Who is the DME: [facility]    Additional notes: PT NOTES THAT HE IS HAVING CONCERNS REGARDING THE PRESSURE OF HIS CURRENT DEVICE. PT STATES THAT HE HAS BEEN HAVING INCREASED DIFFICULTY GETTING AIR WHEN USING THE CPAP MACHINE FOR THE LAST FEW MONTHS. PT STATES THAT HIS CURRENT PRESSURE SETTING IS SET TO 5-10.     PT STATES HE IS UNSURE IF HIS CONCERNS ARE DIRECTLY RELATED TO THE DEVICE BEING RECALLED, IF HIS MACHINE IS JUST TOO OLD, OR IF HIS SLEEP APNEA HAS WORSENED WARRANTING HIS SETTINGS BE READJUSTED.    AGAIN, I ASKED PT CALL [employer] TO REGISTER HIS DEVICE AND CALL US BACK TO CONFIRM. PT VERBALIZED UNDERSTANDING AND WILL CALL US BACK SHORTLY.    PLEASE REVIEW AND ADVISE.

## 2022-03-02 ENCOUNTER — PATIENT MESSAGE (OUTPATIENT)
Dept: FAMILY MEDICINE CLINIC | Facility: CLINIC | Age: 57
End: 2022-03-02

## 2022-03-02 DIAGNOSIS — G47.00 INSOMNIA, UNSPECIFIED TYPE: Primary | ICD-10-CM

## 2022-03-02 DIAGNOSIS — H53.9 VISION ABNORMALITIES: ICD-10-CM

## 2022-03-03 NOTE — TELEPHONE ENCOUNTER
From: Choco Bryant  To: Jacinta Penn, APRN  Sent: 3/2/2022 4:49 PM EST  Subject: Referrals    I need referrals submitted to insurance for Dr Montes and Yobany oconnor

## 2022-03-04 RX ORDER — TAMSULOSIN HYDROCHLORIDE 0.4 MG/1
1 CAPSULE ORAL DAILY
Qty: 90 CAPSULE | Refills: 1 | Status: SHIPPED | OUTPATIENT
Start: 2022-03-04 | End: 2022-08-31

## 2022-03-04 RX ORDER — ATORVASTATIN CALCIUM 20 MG/1
20 TABLET, FILM COATED ORAL DAILY
Qty: 90 TABLET | Refills: 1 | Status: SHIPPED | OUTPATIENT
Start: 2022-03-04 | End: 2022-08-31

## 2022-03-04 RX ORDER — AMLODIPINE BESYLATE 10 MG/1
10 TABLET ORAL DAILY
Qty: 90 TABLET | Refills: 0 | Status: SHIPPED | OUTPATIENT
Start: 2022-03-04 | End: 2022-06-02

## 2022-03-09 DIAGNOSIS — G47.00 INSOMNIA, UNSPECIFIED TYPE: ICD-10-CM

## 2022-03-09 RX ORDER — CLONAZEPAM 2 MG/1
2 TABLET ORAL NIGHTLY PRN
Qty: 30 TABLET | Refills: 0 | Status: SHIPPED | OUTPATIENT
Start: 2022-03-09 | End: 2022-04-12 | Stop reason: SDUPTHER

## 2022-03-16 ENCOUNTER — TELEPHONE (OUTPATIENT)
Dept: FAMILY MEDICINE CLINIC | Facility: CLINIC | Age: 57
End: 2022-03-16

## 2022-03-16 NOTE — TELEPHONE ENCOUNTER
PATIENT'S INSURANCE COMPANY CALLED TO LET US KNOW THAT WE NEED TO SEND A REFERRAL TO THEM BEFORE THE PATIENT CAN SEE BREANN BRASWELL ON Friday. I SAW A [employer] MESSAGE THAT SAID VALERIA HAD SENT REFERRALS TO [facility] ALREADY.    SHE GAVE ME THIS PHONE NUMBER  [phone]

## 2022-03-16 NOTE — TELEPHONE ENCOUNTER
Spoke with Mayi with [facility] and she helped me put a new referral [facility] are not able to do a referral for a facility it has to be a provider.  She did give me a Referral number [id number]   The previous referral had the wrong tax ID number for the Dr. And facility.

## 2022-03-24 NOTE — TELEPHONE ENCOUNTER
Spoke with pt, he is using wife old pap. Too old to get replacement from [employer]. Will send order for new pap as soon as he brings card in and we get SCR so Dr. Seipel can adjust pressure. Pt to bring in card.

## 2022-03-25 ENCOUNTER — PATIENT MESSAGE (OUTPATIENT)
Dept: FAMILY MEDICINE CLINIC | Facility: CLINIC | Age: 57
End: 2022-03-25

## 2022-03-27 RX ORDER — BACLOFEN 10 MG/1
10 TABLET ORAL 3 TIMES DAILY PRN
Qty: 30 TABLET | Refills: 0 | Status: SHIPPED | OUTPATIENT
Start: 2022-03-27 | End: 2022-04-14 | Stop reason: SDUPTHER

## 2022-03-27 NOTE — TELEPHONE ENCOUNTER
From: Choco Bryant  To: PAVITHRA Merrill  Sent: 3/25/2022 12:14 PM EDT  Subject: Pulled muscles mid back    I have pulled the muscles in my mid back area the chiropractor helps temporarily for about a day, and then any twisting or turning hurts a great deal. I was wondering if you could prescribe me some muscle relaxers? I’m hoping those in combination with the ibuprofen will help alleviate the issue. If you need to see me first please let me know and we will schedule an appointment. Thank you

## 2022-04-04 ENCOUNTER — TELEPHONE (OUTPATIENT)
Dept: NEUROLOGY | Facility: CLINIC | Age: 57
End: 2022-04-04

## 2022-04-04 DIAGNOSIS — G47.33 OBSTRUCTIVE SLEEP APNEA: Primary | ICD-10-CM

## 2022-04-04 NOTE — TELEPHONE ENCOUNTER
LM FOR PATIENT TO COME BY OFFICE TO  HIS CPAP CHIP  WILL NEED TO TAKE TO [facility] FOR THEM TO CONNECT TO US. ORDER FOR PRESSURE CHANGE WAS FAXED TO [facility] TODAY.  HUB CAN RELAY MESSAGE

## 2022-04-11 ENCOUNTER — PATIENT MESSAGE (OUTPATIENT)
Dept: FAMILY MEDICINE CLINIC | Facility: CLINIC | Age: 57
End: 2022-04-11

## 2022-04-11 DIAGNOSIS — G47.00 INSOMNIA, UNSPECIFIED TYPE: ICD-10-CM

## 2022-04-12 RX ORDER — CLONAZEPAM 2 MG/1
2 TABLET ORAL NIGHTLY PRN
Qty: 30 TABLET | Refills: 0 | Status: SHIPPED | OUTPATIENT
Start: 2022-04-12 | End: 2022-05-19 | Stop reason: SDUPTHER

## 2022-04-12 NOTE — TELEPHONE ENCOUNTER
From: Choco Bryant  To: PAVITHRA Merrill  Sent: 4/11/2022 8:50 PM EDT  Subject: Refill on my clomazapam    I need a refill sent to [facility] for my clomazapam prescription please. I meant to send this Friday but I completely forgot. Thank you for your help

## 2022-04-14 ENCOUNTER — PATIENT MESSAGE (OUTPATIENT)
Dept: FAMILY MEDICINE CLINIC | Facility: CLINIC | Age: 57
End: 2022-04-14

## 2022-04-14 RX ORDER — BACLOFEN 10 MG/1
10 TABLET ORAL 3 TIMES DAILY PRN
Qty: 30 TABLET | Refills: 0 | Status: SHIPPED | OUTPATIENT
Start: 2022-04-14 | End: 2023-03-19

## 2022-04-14 NOTE — TELEPHONE ENCOUNTER
From: Choco Bryant  To: PAVITHRA Merrill  Sent: 4/14/2022 9:06 AM EDT  Subject: Baclofen refill    My back is slowly getting better, but still hurts quite a bit. The chiropractor is helping but the Baclofen makes sleeping much easier. Would it be possible to get a refill? If you need to see me let me know and I will make an appointment.     Thank you.

## 2022-05-19 DIAGNOSIS — G47.00 INSOMNIA, UNSPECIFIED TYPE: ICD-10-CM

## 2022-05-19 RX ORDER — CLONAZEPAM 2 MG/1
2 TABLET ORAL NIGHTLY PRN
Qty: 30 TABLET | Refills: 0 | Status: SHIPPED | OUTPATIENT
Start: 2022-05-19 | End: 2022-06-24 | Stop reason: SDUPTHER

## 2022-05-19 RX ORDER — VORTIOXETINE 20 MG/1
20 TABLET, FILM COATED ORAL DAILY
Qty: 90 TABLET | Refills: 1 | Status: SHIPPED | OUTPATIENT
Start: 2022-05-19 | End: 2022-07-14 | Stop reason: SDUPTHER

## 2022-06-02 RX ORDER — OMEPRAZOLE 40 MG/1
40 CAPSULE, DELAYED RELEASE ORAL DAILY
Qty: 90 CAPSULE | Refills: 1 | Status: SHIPPED | OUTPATIENT
Start: 2022-06-02 | End: 2022-11-29

## 2022-06-02 RX ORDER — AMLODIPINE BESYLATE 10 MG/1
10 TABLET ORAL DAILY
Qty: 90 TABLET | Refills: 0 | Status: SHIPPED | OUTPATIENT
Start: 2022-06-02 | End: 2022-08-31

## 2022-06-23 ENCOUNTER — PATIENT MESSAGE (OUTPATIENT)
Dept: FAMILY MEDICINE CLINIC | Facility: CLINIC | Age: 57
End: 2022-06-23

## 2022-06-23 DIAGNOSIS — G47.00 INSOMNIA, UNSPECIFIED TYPE: ICD-10-CM

## 2022-06-24 RX ORDER — CLONAZEPAM 2 MG/1
2 TABLET ORAL NIGHTLY PRN
Qty: 30 TABLET | Refills: 0 | Status: SHIPPED | OUTPATIENT
Start: 2022-06-24 | End: 2022-06-27 | Stop reason: SDUPTHER

## 2022-06-27 RX ORDER — CLONAZEPAM 2 MG/1
2 TABLET ORAL NIGHTLY PRN
Qty: 30 TABLET | Refills: 0 | Status: SHIPPED | OUTPATIENT
Start: 2022-06-27 | End: 2022-09-08 | Stop reason: SDUPTHER

## 2022-06-27 NOTE — TELEPHONE ENCOUNTER
From: Choco Bryant  To: Jacinta Penn, PAVITHRA  Sent: 6/23/2022 9:40 AM EDT  Subject: Prescription refil    Would it be possible for you to refill my clomazapam? Thank you

## 2022-07-14 ENCOUNTER — OFFICE VISIT (OUTPATIENT)
Dept: FAMILY MEDICINE CLINIC | Facility: CLINIC | Age: 57
End: 2022-07-14

## 2022-07-14 VITALS
SYSTOLIC BLOOD PRESSURE: 146 MMHG | BODY MASS INDEX: 34.3 KG/M2 | RESPIRATION RATE: 15 BRPM | WEIGHT: 245 LBS | TEMPERATURE: 97.7 F | OXYGEN SATURATION: 98 % | DIASTOLIC BLOOD PRESSURE: 82 MMHG | HEART RATE: 90 BPM | HEIGHT: 71 IN

## 2022-07-14 DIAGNOSIS — F33.2 SEVERE EPISODE OF RECURRENT MAJOR DEPRESSIVE DISORDER, WITHOUT PSYCHOTIC FEATURES: Primary | ICD-10-CM

## 2022-07-14 DIAGNOSIS — F33.42 RECURRENT MAJOR DEPRESSIVE DISORDER, IN FULL REMISSION: ICD-10-CM

## 2022-07-14 DIAGNOSIS — K52.9 CHRONIC DIARRHEA: ICD-10-CM

## 2022-07-14 PROCEDURE — 99214 OFFICE O/P EST MOD 30 MIN: CPT | Performed by: NURSE PRACTITIONER

## 2022-07-14 RX ORDER — VORTIOXETINE 20 MG/1
20 TABLET, FILM COATED ORAL DAILY
Qty: 90 TABLET | Refills: 1 | Status: SHIPPED | OUTPATIENT
Start: 2022-07-14 | End: 2023-01-17 | Stop reason: SDUPTHER

## 2022-07-14 NOTE — PROGRESS NOTES
"Chief Complaint  Depression  Subjective        Choco Bryant presents to [facility]  Pt comes in today with c/o depression and diarrhea  Depression: was taking trintellix, but stopped it about 2 months ago due to insurance issues. Was working well for him. States he feels \"nothing\" and \"cares about nothing\". Denies any SI/HI, but \"wouldn't care if I got hit by a bus\".    Has been on other meds in the past including vraylar and others, but trintellix worked well.   Was getting it covered with insurance, but recently cost went up to over >$500.   He is also going to counseling regularly at [place].     Diarrhea: has been constant and worsening over the past couple of months. No hx of IBS. Takes otc immodium and that helps. No change in diet. Due for colonoscopy. No vomiting. No weight loss.        Objective     Vital Signs:   /82   Pulse 90   Temp 97.7 °F (36.5 °C)   Resp 15   Ht 180.3 cm (71\")   Wt 111 kg (245 lb)   SpO2 98%   BMI 34.17 kg/m²       BP Readings from Last 3 Encounters:   07/14/22 146/82   12/09/21 136/84   12/06/21 127/74       Wt Readings from Last 3 Encounters:   07/14/22 111 kg (245 lb)   12/09/21 108 kg (238 lb)   12/06/21 108 kg (237 lb)     Physical Exam  Constitutional:       Appearance: He is well-developed.   Eyes:      Pupils: Pupils are equal, round, and reactive to light.   Cardiovascular:      Rate and Rhythm: Normal rate and regular rhythm.   Pulmonary:      Effort: Pulmonary effort is normal.      Breath sounds: Normal breath sounds.   Neurological:      Mental Status: He is alert and oriented to person, place, and time.   Psychiatric:         Mood and Affect: Mood normal.         Behavior: Behavior normal.         Thought Content: Thought content normal.        Result Review :                 Assessment and Plan    Diagnoses and all orders for this visit:    1. Severe episode of recurrent major depressive disorder, without psychotic features " (HCC) (Primary)  -     Vortioxetine HBr (Trintellix) 20 MG tablet; Take 20 mg by mouth Daily.  Dispense: 90 tablet; Refill: 1    2. Chronic diarrhea  -     Ambulatory Referral to Gastroenterology    3. Recurrent major depressive disorder, in full remission (HCC)    will try to get PA on trintellix. Samples given for now  Referral to [facility]  Cont counseling  During this office visit, we discussed the pertinent aspects of the visit and treatment recommendations. Pt verbalizes understanding. Follow up was discussed. Patient was given the opportunity to ask questions and discuss other concerns.         Follow Up   Return in about 4 weeks (around 8/11/2022).  Patient was given instructions and counseling regarding his condition or for health maintenance advice. Please see specific information pulled into the AVS if appropriate.

## 2022-08-31 RX ORDER — ATORVASTATIN CALCIUM 20 MG/1
20 TABLET, FILM COATED ORAL DAILY
Qty: 90 TABLET | Refills: 1 | Status: SHIPPED | OUTPATIENT
Start: 2022-08-31 | End: 2023-02-27

## 2022-08-31 RX ORDER — TAMSULOSIN HYDROCHLORIDE 0.4 MG/1
1 CAPSULE ORAL DAILY
Qty: 90 CAPSULE | Refills: 1 | Status: SHIPPED | OUTPATIENT
Start: 2022-08-31 | End: 2023-02-27

## 2022-08-31 RX ORDER — AMLODIPINE BESYLATE 10 MG/1
10 TABLET ORAL DAILY
Qty: 90 TABLET | Refills: 0 | Status: SHIPPED | OUTPATIENT
Start: 2022-08-31 | End: 2022-11-29

## 2022-09-08 DIAGNOSIS — G47.00 INSOMNIA, UNSPECIFIED TYPE: ICD-10-CM

## 2022-09-08 RX ORDER — CLONAZEPAM 2 MG/1
2 TABLET ORAL NIGHTLY PRN
Qty: 30 TABLET | Refills: 0 | Status: SHIPPED | OUTPATIENT
Start: 2022-09-08 | End: 2022-10-06 | Stop reason: SDUPTHER

## 2022-09-12 ENCOUNTER — TELEPHONE (OUTPATIENT)
Dept: FAMILY MEDICINE CLINIC | Facility: CLINIC | Age: 57
End: 2022-09-12

## 2022-09-12 NOTE — TELEPHONE ENCOUNTER
Caller: Choco Bryant    Relationship: Self    Best call back number: [phone]    What is the medical concern/diagnosis: DISCUSS COLONOSCOPY    What specialty or service is being requested: GASTROENTEROLOGY     What is the provider, practice or medical service name: [facility]     What is the office location:     [address] [place] [address]    What is the office phone number: [phone]    Any additional details: PATIENT SAID THAT HE NEEDS A REFERRAL SENT TO HIS INSURANCE PROVIDER BEFORE THEY WILL COVER HIS VISIT WITH [facility]     HE SAID HE HAS SCHEDULED AN APPOINTMENT TOMORROW WITH THEM, BUT INSURANCE WILL NOT PAY FOR IT WITHOUT THAT REFERRAL BEING REVIEWED BY THEM, HE SAID THAT HIS INSURANCE PROVIDER IS [facility]

## 2022-09-12 NOTE — TELEPHONE ENCOUNTER
Referral faxed over to the insurance to [phone] which is the fax number provided by the insurance company when I called them

## 2022-09-13 ENCOUNTER — OFFICE (OUTPATIENT)
Dept: URBAN - METROPOLITAN AREA CLINIC 64 | Facility: CLINIC | Age: 57
End: 2022-09-13

## 2022-09-13 VITALS
HEIGHT: 70 IN | WEIGHT: 249 LBS | HEART RATE: 72 BPM | SYSTOLIC BLOOD PRESSURE: 150 MMHG | DIASTOLIC BLOOD PRESSURE: 96 MMHG

## 2022-09-13 DIAGNOSIS — R19.7 DIARRHEA, UNSPECIFIED: ICD-10-CM

## 2022-09-13 PROCEDURE — 99204 OFFICE O/P NEW MOD 45 MIN: CPT | Performed by: INTERNAL MEDICINE

## 2022-10-06 DIAGNOSIS — G47.00 INSOMNIA, UNSPECIFIED TYPE: ICD-10-CM

## 2022-10-10 RX ORDER — CLONAZEPAM 2 MG/1
2 TABLET ORAL NIGHTLY PRN
Qty: 30 TABLET | Refills: 0 | Status: SHIPPED | OUTPATIENT
Start: 2022-10-10 | End: 2022-11-14

## 2022-10-14 ENCOUNTER — TELEPHONE (OUTPATIENT)
Dept: FAMILY MEDICINE CLINIC | Facility: CLINIC | Age: 57
End: 2022-10-14

## 2022-10-14 NOTE — TELEPHONE ENCOUNTER
Caller: Choco Bryant    Relationship: Self    Best call back number: [phone]     What orders are you requesting (i.e. lab or imaging): COLONOSCOPY     In what timeframe would the patient need to come in: SOONEST AVAILABLE     Where will you receive your lab/imaging services: [facility]     Additional notes:     CHOCO SAYS THAT HIS COLONOSCOPY WAS CANCELLED BECAUSE HIS INSURANCE HAS NOT RECEIVED THE REFERRAL. HE WOULD LIKE A CALL BACK WHEN REFERRAL IS SENT.

## 2022-10-17 NOTE — TELEPHONE ENCOUNTER
Left detailed message on patient's voice mail at 9:53am to call back with who is covered under his insurance.    HUB TO SHARE    He will need to check to see who is in network for us to know where to send the referral.

## 2022-10-24 ENCOUNTER — TELEPHONE (OUTPATIENT)
Dept: FAMILY MEDICINE CLINIC | Facility: CLINIC | Age: 57
End: 2022-10-24

## 2022-10-24 NOTE — TELEPHONE ENCOUNTER
ALFREDO MURGUIA WITH [facility]    SEE PREVIOUS ENCOUNTER FOR REFERRAL

## 2022-10-31 ENCOUNTER — TELEPHONE (OUTPATIENT)
Dept: FAMILY MEDICINE CLINIC | Facility: CLINIC | Age: 57
End: 2022-10-31

## 2022-11-03 ENCOUNTER — TELEPHONE (OUTPATIENT)
Dept: FAMILY MEDICINE CLINIC | Facility: CLINIC | Age: 57
End: 2022-11-03

## 2022-11-03 NOTE — TELEPHONE ENCOUNTER
Caller: Choco Bryant    Relationship: Self    Best call back number: [phone]    What is the medical concern/diagnosis: COLONOSCOPY    Any additional details: PATIENT STATES THAT HE HAS BEEN TRYING TO GET THE REFERRAL FOR HIS REFERRAL FOR HIS COLONOSCOPY SENT TO HIS INSURANCE. THEY WILL NOT COVER THE PROCEDURE UNTIL THEY RECEIVE A COPY OF THE REFERRAL, AND HAVE CONTACTED THE OFFICE REQUESTING THIS INFORMATION. PLEASE CALL PATIENT TO CONFIRM WHEN THIS HAS BEEN COMPLETED

## 2022-11-10 DIAGNOSIS — G47.00 INSOMNIA, UNSPECIFIED TYPE: ICD-10-CM

## 2022-11-14 RX ORDER — CLONAZEPAM 2 MG/1
2 TABLET ORAL NIGHTLY PRN
Qty: 30 TABLET | Refills: 0 | Status: SHIPPED | OUTPATIENT
Start: 2022-11-14 | End: 2022-12-26 | Stop reason: SDUPTHER

## 2022-11-29 RX ORDER — AMLODIPINE BESYLATE 10 MG/1
10 TABLET ORAL DAILY
Qty: 90 TABLET | Refills: 0 | Status: SHIPPED | OUTPATIENT
Start: 2022-11-29 | End: 2023-02-27

## 2022-11-29 RX ORDER — OMEPRAZOLE 40 MG/1
40 CAPSULE, DELAYED RELEASE ORAL DAILY
Qty: 90 CAPSULE | Refills: 1 | Status: SHIPPED | OUTPATIENT
Start: 2022-11-29

## 2022-12-06 ENCOUNTER — OFFICE VISIT (OUTPATIENT)
Dept: FAMILY MEDICINE CLINIC | Facility: CLINIC | Age: 57
End: 2022-12-06

## 2022-12-06 VITALS
WEIGHT: 256 LBS | HEART RATE: 75 BPM | SYSTOLIC BLOOD PRESSURE: 122 MMHG | TEMPERATURE: 97.7 F | OXYGEN SATURATION: 96 % | RESPIRATION RATE: 15 BRPM | BODY MASS INDEX: 35.84 KG/M2 | DIASTOLIC BLOOD PRESSURE: 82 MMHG | HEIGHT: 71 IN

## 2022-12-06 DIAGNOSIS — F33.41 RECURRENT MAJOR DEPRESSIVE DISORDER, IN PARTIAL REMISSION: Primary | Chronic | ICD-10-CM

## 2022-12-06 DIAGNOSIS — B97.89 SORE THROAT (VIRAL): ICD-10-CM

## 2022-12-06 DIAGNOSIS — J02.8 SORE THROAT (VIRAL): ICD-10-CM

## 2022-12-06 DIAGNOSIS — R42 VERTIGO: ICD-10-CM

## 2022-12-06 LAB
EXPIRATION DATE: NORMAL
INTERNAL CONTROL: NORMAL
Lab: NORMAL
S PYO AG THROAT QL: NEGATIVE

## 2022-12-06 PROCEDURE — 87880 STREP A ASSAY W/OPTIC: CPT | Performed by: NURSE PRACTITIONER

## 2022-12-06 PROCEDURE — 99214 OFFICE O/P EST MOD 30 MIN: CPT | Performed by: NURSE PRACTITIONER

## 2022-12-06 RX ORDER — MECLIZINE HYDROCHLORIDE 25 MG/1
25 TABLET ORAL 3 TIMES DAILY PRN
Qty: 30 TABLET | Refills: 0 | Status: SHIPPED | OUTPATIENT
Start: 2022-12-06 | End: 2022-12-16

## 2022-12-06 RX ORDER — ARIPIPRAZOLE 2 MG/1
2 TABLET ORAL DAILY
Qty: 30 TABLET | Refills: 3 | Status: SHIPPED | OUTPATIENT
Start: 2022-12-06 | End: 2023-03-23 | Stop reason: SDUPTHER

## 2022-12-06 NOTE — PROGRESS NOTES
"Chief Complaint  Sore Throat, Dizziness, and Nausea  Subjective        Choco Bryant presents to [facility]  History of Present Illness  Pt comes in today with c/o ST that started about 2 days ago. Pain with swallowing.   Also with drainage and nasal congestion.   No sinus pain or pressure.  No fever or chills.   Yesterday had some severe vertigo.   No ear pain.   Vertigo is better today, but feeling woozy. No longer \"spinning\".   No N/V/D.  Just taking tylenol, using throat lozenges and throat spray.   Drinking lots of fluids.     Also with concerns of ongoing depression. Currently taking trintellix. Sees therapist at [place]. Was taking abilify not long ago that was prescribed at [facility]. Was doing well on the combo. therpaist recommended that he restart it. States he stopped it around June.        Objective     Vital Signs:   /82   Pulse 75   Temp 97.7 °F (36.5 °C)   Resp 15   Ht 180.3 cm (71\")   Wt 116 kg (256 lb)   SpO2 96%   BMI 35.70 kg/m²       BP Readings from Last 3 Encounters:   12/06/22 122/82   07/14/22 146/82   12/09/21 136/84       Wt Readings from Last 3 Encounters:   12/06/22 116 kg (256 lb)   07/14/22 111 kg (245 lb)   12/09/21 108 kg (238 lb)     Physical Exam  Constitutional:       Appearance: He is well-developed.   HENT:      Nose:      Right Sinus: No maxillary sinus tenderness or frontal sinus tenderness.      Left Sinus: No maxillary sinus tenderness or frontal sinus tenderness.      Mouth/Throat:      Pharynx: Posterior oropharyngeal erythema present.      Comments: +PND   Eyes:      Pupils: Pupils are equal, round, and reactive to light.   Cardiovascular:      Rate and Rhythm: Normal rate and regular rhythm.   Pulmonary:      Effort: Pulmonary effort is normal.      Breath sounds: Normal breath sounds.   Neurological:      Mental Status: He is alert and oriented to person, place, and time.        Result Review :                 Assessment " and Plan    Diagnoses and all orders for this visit:    1. Recurrent major depressive disorder, in partial remission (HCC) (Primary)  -     ARIPiprazole (Abilify) 2 MG tablet; Take 1 tablet by mouth Daily.  Dispense: 30 tablet; Refill: 3    2. Sore throat (viral)  -     POCT rapid strep A    3. Vertigo  -     meclizine (ANTIVERT) 25 MG tablet; Take 1 tablet by mouth 3 (Three) Times a Day As Needed for Dizziness for up to 10 days.  Dispense: 30 tablet; Refill: 0    RSS neg  Cont with tylenol/IBU, recommend starting flonase daily   Meclizine prn  Add abilify  Cont therapy  During this office visit, we discussed the pertinent aspects of the visit and treatment recommendations. Pt verbalizes understanding. Follow up was discussed. Patient was given the opportunity to ask questions and discuss other concerns.         Follow Up   Return if symptoms worsen or fail to improve.  Patient was given instructions and counseling regarding his condition or for health maintenance advice. Please see specific information pulled into the AVS if appropriate.

## 2022-12-15 ENCOUNTER — TELEPHONE (OUTPATIENT)
Dept: FAMILY MEDICINE CLINIC | Facility: CLINIC | Age: 57
End: 2022-12-15

## 2022-12-15 NOTE — TELEPHONE ENCOUNTER
Caller: Choco Bryant    Relationship: Self    Best call back number: [phone] (Home)    What is the best time to reach you: ANYTIME    Who are you requesting to speak with (clinical staff, provider,  specific staff member): CLINICAL STAFF    Do you know the name of the person who called:     What was the call regarding: PATIENT IS WANTING AN X-RAY OF THE LEFT ELBOW. PATIENT IS ALSO WANTING TO KNOW IF HE WOULD NEED TO MAKE AN APPOINTMENT BEFORE OR  AFTER TO GO OVER THE RESULTS OF THE X-RAY.    Do you require a callback: YES        THANKS

## 2022-12-16 NOTE — TELEPHONE ENCOUNTER
Gave message to patient and scheduled an appt with [facility] on 12/20/2022 at 11:30am.

## 2022-12-20 ENCOUNTER — OFFICE VISIT (OUTPATIENT)
Dept: FAMILY MEDICINE CLINIC | Facility: CLINIC | Age: 57
End: 2022-12-20

## 2022-12-20 VITALS
HEIGHT: 71 IN | DIASTOLIC BLOOD PRESSURE: 103 MMHG | BODY MASS INDEX: 36.26 KG/M2 | RESPIRATION RATE: 18 BRPM | HEART RATE: 82 BPM | SYSTOLIC BLOOD PRESSURE: 145 MMHG | OXYGEN SATURATION: 97 % | WEIGHT: 259 LBS | TEMPERATURE: 97.8 F

## 2022-12-20 DIAGNOSIS — E66.01 CLASS 2 SEVERE OBESITY WITH SERIOUS COMORBIDITY AND BODY MASS INDEX (BMI) OF 36.0 TO 36.9 IN ADULT, UNSPECIFIED OBESITY TYPE: ICD-10-CM

## 2022-12-20 DIAGNOSIS — M25.522 LEFT ELBOW PAIN: Primary | ICD-10-CM

## 2022-12-20 DIAGNOSIS — I10 ESSENTIAL HYPERTENSION: Chronic | ICD-10-CM

## 2022-12-20 DIAGNOSIS — R09.82 POST-NASAL DRIP: ICD-10-CM

## 2022-12-20 PROCEDURE — 99214 OFFICE O/P EST MOD 30 MIN: CPT | Performed by: NURSE PRACTITIONER

## 2022-12-20 NOTE — PROGRESS NOTES
"Chief Complaint  Elbow Pain  Subjective        Choco Bryant presents to [facility]  History of Present Illness  Pt comes in today with c/o left elbow pain. Started about 3 weeks ago. Was having pain when bumping it or leaning on it.   However, he woke up this morning and it is completely resolved.   He is also concerned about his weight. Has tried verma and trying to watch portions in the past.   Not exercising.   Does admit to drinking a couple of soft drinks/day.   Open to suggestions.   BP is elevated today. Has not taken hTN medications yet today        Objective     Vital Signs:   BP (!) 145/103   Pulse 82   Temp 97.8 °F (36.6 °C)   Resp 18   Ht 180.3 cm (71\")   Wt 117 kg (259 lb)   SpO2 97%   BMI 36.12 kg/m²       BP Readings from Last 3 Encounters:   12/20/22 (!) 145/103   12/06/22 122/82   07/14/22 146/82       Wt Readings from Last 3 Encounters:   12/20/22 117 kg (259 lb)   12/06/22 116 kg (256 lb)   07/14/22 111 kg (245 lb)     Physical Exam  Constitutional:       Appearance: He is well-developed.   Eyes:      Pupils: Pupils are equal, round, and reactive to light.   Cardiovascular:      Rate and Rhythm: Normal rate and regular rhythm.   Pulmonary:      Effort: Pulmonary effort is normal.      Breath sounds: Normal breath sounds.   Neurological:      Mental Status: He is alert and oriented to person, place, and time.        Result Review :                 Assessment and Plan    Diagnoses and all orders for this visit:    1. Left elbow pain (Primary)  Comments:  improved. will monitor     2. Essential hypertension    3. Class 2 severe obesity with serious comorbidity and body mass index (BMI) of 36.0 to 36.9 in adult, unspecified obesity type (HCC)    4. Post-nasal drip  Comments:  restart flonase     Repeat /94. Will take meds and monitor BP at home and keep diary   Discussed diet. Avoiding soft drinks. Watching portion sizes, and trying WW. Also discussed " importance of exercise  During this office visit, we discussed the pertinent aspects of the visit and treatment recommendations. Pt verbalizes understanding. Follow up was discussed. Patient was given the opportunity to ask questions and discuss other concerns.         Follow Up   Return in about 3 months (around 3/20/2023) for Annual physical.  Patient was given instructions and counseling regarding his condition or for health maintenance advice. Please see specific information pulled into the AVS if appropriate.

## 2022-12-26 DIAGNOSIS — G47.00 INSOMNIA, UNSPECIFIED TYPE: ICD-10-CM

## 2022-12-27 ENCOUNTER — PATIENT MESSAGE (OUTPATIENT)
Dept: FAMILY MEDICINE CLINIC | Facility: CLINIC | Age: 57
End: 2022-12-27

## 2022-12-28 RX ORDER — CLONAZEPAM 2 MG/1
2 TABLET ORAL NIGHTLY PRN
Qty: 30 TABLET | Refills: 0 | Status: SHIPPED | OUTPATIENT
Start: 2022-12-28 | End: 2023-01-22 | Stop reason: SDUPTHER

## 2022-12-28 RX ORDER — FLUTICASONE PROPIONATE 50 MCG
SPRAY, SUSPENSION (ML) NASAL
Qty: 48 G | Refills: 3 | Status: SHIPPED | OUTPATIENT
Start: 2022-12-28

## 2022-12-28 RX ORDER — FLUTICASONE PROPIONATE 50 MCG
2 SPRAY, SUSPENSION (ML) NASAL DAILY
Qty: 16 G | Refills: 3 | Status: SHIPPED | OUTPATIENT
Start: 2022-12-28 | End: 2022-12-28

## 2022-12-28 NOTE — TELEPHONE ENCOUNTER
From: Choco Bryant  To: Jacinta Penn  Sent: 12/27/2022 7:03 AM EST  Subject: Prescription request    Would it be possible to ge a prescription for the Flonase?

## 2023-01-17 DIAGNOSIS — F33.2 SEVERE EPISODE OF RECURRENT MAJOR DEPRESSIVE DISORDER, WITHOUT PSYCHOTIC FEATURES: ICD-10-CM

## 2023-01-18 RX ORDER — VORTIOXETINE 20 MG/1
20 TABLET, FILM COATED ORAL DAILY
Qty: 90 TABLET | Refills: 1 | Status: SHIPPED | OUTPATIENT
Start: 2023-01-18 | End: 2023-01-19 | Stop reason: SDUPTHER

## 2023-01-19 DIAGNOSIS — F33.2 SEVERE EPISODE OF RECURRENT MAJOR DEPRESSIVE DISORDER, WITHOUT PSYCHOTIC FEATURES: ICD-10-CM

## 2023-01-19 RX ORDER — VORTIOXETINE 20 MG/1
20 TABLET, FILM COATED ORAL DAILY
Qty: 30 TABLET | Refills: 0 | Status: SHIPPED | OUTPATIENT
Start: 2023-01-19

## 2023-01-19 NOTE — TELEPHONE ENCOUNTER
Caller: Choco Bryant    Relationship: Self    Best call back number: [phone]    Requested Prescriptions:   Requested Prescriptions     Pending Prescriptions Disp Refills   • Vortioxetine HBr (Trintellix) 20 MG tablet 90 tablet 1     Sig: Take 1 tablet by mouth Daily.        Pharmacy where request should be sent: [id number] [address] [place] [address] AT [facility] [phone] [facility] [phone] FX     Additional details provided by patient: PATIENT STATES THAT WHEN HE WENT TO  THIS MEDICATION FROM PHARMACY FOR A 90 DAY SUPPLY, THE PHARMACY ADVISED HIM THAT THIS WOULD BE $900 DUE TO HIS INSURANCE NOT COVERING IT. THE PHARMACY SUGGESTED THAT JERROD WRITES A 30 DAY SUPPLY OF THIS MEDICATION, SO THAT IT DOESN'T COST AS MUCH. PATIENT WOULD LIKE FOR THERE TO BE SENT A PRIOR AUTHORIZATION FOR THIS MEDICATION AS WELL, JUST TO BE SAFE. HE WOULD LIKE TO KNOW IF THERE ARE ANY SAMPLES AVAILABLE IN THE OFFICE FOR HIM TO , TO HELP HIM GET THROUGH UNTIL THERE IS A SOLUTION FOR THIS. PATIENT STATES THAT HE IS COMPLETELY OUT OF THIS MEDICATION AS OF RIGHT NOW. PLEASE ADVISE PATIENT ON NEXT STEPS.   Does the patient have less than a 3 day supply:  [x] Yes  [] No    Would you like a call back once the refill request has been completed: [x] Yes [] No    If the office needs to give you a call back, can they leave a voicemail: [x] Yes [] No    Harjinder Jimenez Rep   01/19/23 09:13 EST

## 2023-01-22 DIAGNOSIS — G47.00 INSOMNIA, UNSPECIFIED TYPE: ICD-10-CM

## 2023-01-23 RX ORDER — CLONAZEPAM 2 MG/1
2 TABLET ORAL NIGHTLY PRN
Qty: 30 TABLET | Refills: 0 | Status: SHIPPED | OUTPATIENT
Start: 2023-01-23 | End: 2023-02-28 | Stop reason: SDUPTHER

## 2023-01-30 ENCOUNTER — OFFICE VISIT (OUTPATIENT)
Dept: FAMILY MEDICINE CLINIC | Facility: CLINIC | Age: 58
End: 2023-01-30
Payer: COMMERCIAL

## 2023-01-30 VITALS
HEIGHT: 71 IN | HEART RATE: 74 BPM | SYSTOLIC BLOOD PRESSURE: 138 MMHG | TEMPERATURE: 98.2 F | OXYGEN SATURATION: 100 % | RESPIRATION RATE: 16 BRPM | DIASTOLIC BLOOD PRESSURE: 87 MMHG | WEIGHT: 267 LBS | BODY MASS INDEX: 37.38 KG/M2

## 2023-01-30 DIAGNOSIS — M25.521 RIGHT ELBOW PAIN: ICD-10-CM

## 2023-01-30 DIAGNOSIS — L98.9 SKIN LESION OF FACE: ICD-10-CM

## 2023-01-30 DIAGNOSIS — M25.521 RIGHT ELBOW PAIN: Primary | ICD-10-CM

## 2023-01-30 PROCEDURE — 99213 OFFICE O/P EST LOW 20 MIN: CPT | Performed by: NURSE PRACTITIONER

## 2023-01-30 RX ORDER — MELOXICAM 15 MG/1
15 TABLET ORAL DAILY
Qty: 90 TABLET | Refills: 1 | Status: SHIPPED | OUTPATIENT
Start: 2023-01-30

## 2023-01-30 RX ORDER — MELOXICAM 15 MG/1
15 TABLET ORAL DAILY
Qty: 30 TABLET | Refills: 3 | Status: SHIPPED | OUTPATIENT
Start: 2023-01-30 | End: 2023-01-30

## 2023-01-30 NOTE — PROGRESS NOTES
"Chief Complaint  Elbow Injury (Right elbow)  Subjective        Choco Bryant presents to [facility]  History of Present Illness  Pt comes in today with c/o right elbow and arm pain.  Happened on 1/8 when carrying a box spring mattress.   W[place] [facility] [place]9 and given steroid pack and was feeling better, but then flared back up.   No new injuries.  Had full ROM. Worse with picking up items, even a cup of coffee.   Pain radiates down arm.   Taking IBU and using ice.   No numbness or tingling in arm.  No swelling.   Pain radiates down to wrist.     He is also with concerns of lesion on left cheek that has been there for a while. Seems to be changing some. C/o associated itching.        Objective     Vital Signs:   /87   Pulse 74   Temp 98.2 °F (36.8 °C)   Resp 16   Ht 180.3 cm (71\")   Wt 121 kg (267 lb)   SpO2 100%   BMI 37.24 kg/m²       BP Readings from Last 3 Encounters:   01/30/23 138/87   12/20/22 (!) 145/103   12/06/22 122/82       Wt Readings from Last 3 Encounters:   01/30/23 121 kg (267 lb)   12/20/22 117 kg (259 lb)   12/06/22 116 kg (256 lb)     Physical Exam  Constitutional:       Appearance: He is well-developed.   Eyes:      Pupils: Pupils are equal, round, and reactive to light.   Cardiovascular:      Rate and Rhythm: Normal rate and regular rhythm.   Pulmonary:      Effort: Pulmonary effort is normal.      Breath sounds: Normal breath sounds.   Neurological:      Mental Status: He is alert and oriented to person, place, and time.        Result Review :                 Assessment and Plan    Diagnoses and all orders for this visit:    1. Right elbow pain (Primary)  -     meloxicam (Mobic) 15 MG tablet; Take 1 tablet by mouth Daily.  Dispense: 30 tablet; Refill: 3  -     Ambulatory Referral to Hand Surgery    2. Skin lesion of face  -     Ambulatory Referral to Dermatology    will start mobic  Recommend \"tennis elbow\" band   Referral to hand care " center  Referral to derm  During this office visit, we discussed the pertinent aspects of the visit and treatment recommendations. Pt verbalizes understanding. Follow up was discussed. Patient was given the opportunity to ask questions and discuss other concerns.         Follow Up   Return if symptoms worsen or fail to improve.  Patient was given instructions and counseling regarding his condition or for health maintenance advice. Please see specific information pulled into the AVS if appropriate.

## 2023-02-01 ENCOUNTER — ON CAMPUS - OUTPATIENT (OUTPATIENT)
Dept: URBAN - METROPOLITAN AREA HOSPITAL 2 | Facility: HOSPITAL | Age: 58
End: 2023-02-01
Payer: COMMERCIAL

## 2023-02-01 ENCOUNTER — OFFICE (OUTPATIENT)
Dept: URBAN - METROPOLITAN AREA PATHOLOGY 4 | Facility: PATHOLOGY | Age: 58
End: 2023-02-01
Payer: COMMERCIAL

## 2023-02-01 VITALS
TEMPERATURE: 98 F | SYSTOLIC BLOOD PRESSURE: 125 MMHG | SYSTOLIC BLOOD PRESSURE: 129 MMHG | WEIGHT: 264 LBS | OXYGEN SATURATION: 97 % | DIASTOLIC BLOOD PRESSURE: 56 MMHG | HEART RATE: 78 BPM | HEART RATE: 70 BPM | RESPIRATION RATE: 14 BRPM | DIASTOLIC BLOOD PRESSURE: 95 MMHG | OXYGEN SATURATION: 98 % | SYSTOLIC BLOOD PRESSURE: 130 MMHG | DIASTOLIC BLOOD PRESSURE: 61 MMHG | SYSTOLIC BLOOD PRESSURE: 110 MMHG | HEART RATE: 74 BPM | HEART RATE: 73 BPM | SYSTOLIC BLOOD PRESSURE: 100 MMHG | DIASTOLIC BLOOD PRESSURE: 57 MMHG | HEIGHT: 70 IN | SYSTOLIC BLOOD PRESSURE: 114 MMHG | HEART RATE: 76 BPM | RESPIRATION RATE: 18 BRPM | DIASTOLIC BLOOD PRESSURE: 99 MMHG | HEART RATE: 79 BPM | OXYGEN SATURATION: 99 % | DIASTOLIC BLOOD PRESSURE: 79 MMHG | DIASTOLIC BLOOD PRESSURE: 87 MMHG | RESPIRATION RATE: 16 BRPM | DIASTOLIC BLOOD PRESSURE: 81 MMHG

## 2023-02-01 DIAGNOSIS — K62.89 OTHER SPECIFIED DISEASES OF ANUS AND RECTUM: ICD-10-CM

## 2023-02-01 DIAGNOSIS — D12.3 BENIGN NEOPLASM OF TRANSVERSE COLON: ICD-10-CM

## 2023-02-01 DIAGNOSIS — Z12.11 ENCOUNTER FOR SCREENING FOR MALIGNANT NEOPLASM OF COLON: ICD-10-CM

## 2023-02-01 DIAGNOSIS — K57.30 DIVERTICULOSIS OF LARGE INTESTINE WITHOUT PERFORATION OR ABS: ICD-10-CM

## 2023-02-01 PROBLEM — K63.5 POLYP OF COLON: Status: ACTIVE | Noted: 2023-02-01

## 2023-02-01 PROBLEM — K63.89 OTHER SPECIFIED DISEASES OF INTESTINE: Status: ACTIVE | Noted: 2023-02-01

## 2023-02-01 LAB
GI HISTOLOGY: A. UNSPECIFIED: (no result)
GI HISTOLOGY: B. SELECT: (no result)
GI HISTOLOGY: PDF REPORT: (no result)

## 2023-02-01 PROCEDURE — 45380 COLONOSCOPY AND BIOPSY: CPT | Mod: 59,33 | Performed by: INTERNAL MEDICINE

## 2023-02-01 PROCEDURE — 45385 COLONOSCOPY W/LESION REMOVAL: CPT | Mod: 33 | Performed by: INTERNAL MEDICINE

## 2023-02-01 PROCEDURE — 88305 TISSUE EXAM BY PATHOLOGIST: CPT | Mod: 26 | Performed by: INTERNAL MEDICINE

## 2023-02-01 PROCEDURE — 45380 COLONOSCOPY AND BIOPSY: CPT | Mod: 33,59 | Performed by: INTERNAL MEDICINE

## 2023-02-13 ENCOUNTER — PATIENT MESSAGE (OUTPATIENT)
Dept: FAMILY MEDICINE CLINIC | Facility: CLINIC | Age: 58
End: 2023-02-13
Payer: COMMERCIAL

## 2023-02-13 ENCOUNTER — TELEPHONE (OUTPATIENT)
Dept: FAMILY MEDICINE CLINIC | Facility: CLINIC | Age: 58
End: 2023-02-13
Payer: COMMERCIAL

## 2023-02-13 DIAGNOSIS — M25.521 RIGHT ELBOW PAIN: Primary | ICD-10-CM

## 2023-02-13 NOTE — TELEPHONE ENCOUNTER
PATIENT WAS REFERRED TO [facility] AND THEY RECEIVED THE REFERRAL AND SCHEDULED HIM FOR 3/15 BUT HE SAID HIS INSURANCE REQUIRES A REFERRAL BE SENT TO THEM ALSO.

## 2023-02-14 NOTE — TELEPHONE ENCOUNTER
From: Choco Bryant  To: Jacinta Penn  Sent: 2/13/2023 6:39 PM EST  Subject: Possible PT    My arm isn’t better, do you think physical therapy would help?

## 2023-02-27 RX ORDER — TAMSULOSIN HYDROCHLORIDE 0.4 MG/1
1 CAPSULE ORAL DAILY
Qty: 90 CAPSULE | Refills: 1 | Status: SHIPPED | OUTPATIENT
Start: 2023-02-27

## 2023-02-27 RX ORDER — ATORVASTATIN CALCIUM 20 MG/1
20 TABLET, FILM COATED ORAL DAILY
Qty: 90 TABLET | Refills: 1 | Status: SHIPPED | OUTPATIENT
Start: 2023-02-27

## 2023-02-27 RX ORDER — AMLODIPINE BESYLATE 10 MG/1
10 TABLET ORAL DAILY
Qty: 90 TABLET | Refills: 0 | Status: SHIPPED | OUTPATIENT
Start: 2023-02-27

## 2023-02-28 DIAGNOSIS — G47.00 INSOMNIA, UNSPECIFIED TYPE: ICD-10-CM

## 2023-03-01 RX ORDER — CLONAZEPAM 2 MG/1
2 TABLET ORAL NIGHTLY PRN
Qty: 30 TABLET | Refills: 0 | Status: SHIPPED | OUTPATIENT
Start: 2023-03-01 | End: 2023-03-27 | Stop reason: SDUPTHER

## 2023-03-14 DIAGNOSIS — Z12.5 PROSTATE CANCER SCREENING: ICD-10-CM

## 2023-03-14 DIAGNOSIS — Z00.00 PREVENTATIVE HEALTH CARE: Primary | ICD-10-CM

## 2023-03-19 ENCOUNTER — HOSPITAL ENCOUNTER (EMERGENCY)
Facility: HOSPITAL | Age: 58
Discharge: HOME | End: 2023-03-19
Attending: EMERGENCY MEDICINE | Admitting: EMERGENCY MEDICINE
Payer: COMMERCIAL

## 2023-03-19 ENCOUNTER — PATIENT MESSAGE (OUTPATIENT)
Dept: FAMILY MEDICINE CLINIC | Facility: CLINIC | Age: 58
End: 2023-03-19
Payer: COMMERCIAL

## 2023-03-19 VITALS
DIASTOLIC BLOOD PRESSURE: 78 MMHG | RESPIRATION RATE: 16 BRPM | HEIGHT: 70 IN | BODY MASS INDEX: 39.55 KG/M2 | TEMPERATURE: 98 F | WEIGHT: 276.24 LBS | OXYGEN SATURATION: 99 % | HEART RATE: 75 BPM | SYSTOLIC BLOOD PRESSURE: 129 MMHG

## 2023-03-19 DIAGNOSIS — S46.911A ELBOW STRAIN, RIGHT, INITIAL ENCOUNTER: ICD-10-CM

## 2023-03-19 DIAGNOSIS — M25.521 RIGHT ELBOW PAIN: Primary | ICD-10-CM

## 2023-03-19 PROCEDURE — 63710000001 PREDNISONE PER 1 MG: Performed by: PHYSICIAN ASSISTANT

## 2023-03-19 PROCEDURE — 99283 EMERGENCY DEPT VISIT LOW MDM: CPT

## 2023-03-19 RX ORDER — METHOCARBAMOL 750 MG/1
750 TABLET, FILM COATED ORAL 3 TIMES DAILY PRN
Qty: 30 TABLET | Refills: 0 | Status: SHIPPED | OUTPATIENT
Start: 2023-03-19

## 2023-03-19 RX ORDER — PREDNISONE 20 MG/1
40 TABLET ORAL ONCE
Status: COMPLETED | OUTPATIENT
Start: 2023-03-19 | End: 2023-03-19

## 2023-03-19 RX ORDER — METHYLPREDNISOLONE 4 MG/1
TABLET ORAL
Qty: 21 EACH | Refills: 0 | Status: SHIPPED | OUTPATIENT
Start: 2023-03-19 | End: 2023-03-20

## 2023-03-19 RX ADMIN — PREDNISONE 40 MG: 20 TABLET ORAL at 12:12

## 2023-03-19 NOTE — DISCHARGE INSTRUCTIONS
Please take Medrol Dosepak to completion even if feeling better.  Please take Robaxin as needed for muscle spasm relief.  Can take your previously prescribed Mobic as well as Tylenol for additional relief.  Please follow-up with [facility] in 1 week's time for symptom follow-up, can also follow-up with neurosurgery, , if symptoms persist.  Please come back to the ER if you spike a high fever or have urinary or stool incontinence as you will need reevaluation the time

## 2023-03-19 NOTE — ED PROVIDER NOTES
Subjective       Patient is a 57-year-old male comes in complaining of right elbow pain.  Patient states that he has had pain in this arm and elbow since January.  Patient states he will have pain that comes and goes.  Patient states that yesterday he was wiping after using the bathroom when he felt a pop.  Patient states that his pain has been worse since that time.  Patient states his pain is worse with supination and pronation of right forearm but otherwise pain is minimal.  Patient states he will have intermittent numbness and tingling in his right index and middle finger as well as some paresthesias of right forearm.  Patient denies any fever, chills, urinary or bowel dysfunction, saddle anesthesia or any trauma or falls to the area.  Patient does report cervical disc disease history.  Patient states he had seen hand surgery, [facility], and had a steroid injection followed by steroid course that improved his symptoms back in January and also had follow-up steroid injection at the hand surgeon 3 days ago with improvement of pain at that time.  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      Review of Systems   Constitutional: Negative for chills, fatigue and fever.   HENT: Negative for congestion, sore throat, tinnitus and trouble swallowing.    Eyes: Negative for photophobia, discharge and visual disturbance.   Respiratory: Negative for cough and shortness of breath.    Cardiovascular: Negative for chest pain and leg swelling.   Gastrointestinal: Negative for abdominal pain, diarrhea, nausea and vomiting.   Genitourinary: Negative for dysuria, flank pain and urgency.   Musculoskeletal: Negative for arthralgias and myalgias.        Right elbow pain   Skin: Negative for rash.   Neurological: Negative for dizziness and headaches.   Psychiatric/Behavioral: Negative for confusion.       Past Medical History:   Diagnosis Date   • ADHD (attention deficit hyperactivity  disorder)    • Anxiety    • AV mark tachycardia (HCC) 05/2017    [facility]   • Cataract 2020    Cornea transplant   • Depression    • GERD (gastroesophageal reflux disease)    • History of atrial tachycardia    • History of supraventricular tachycardia     av node reentrant tachycardia s/p ablation   • Hyperlipidemia    • Hypertension    • MRSA (methicillin resistant staph aureus) culture positive     eye   • Sleep apnea        Allergies   Allergen Reactions   • Sulfa Antibiotics Nausea Only       Past Surgical History:   Procedure Laterality Date   • CARDIAC ABLATION  05/2017   • CARDIAC CATHETERIZATION  2011   • COLONOSCOPY     • CORNEAL TRANSPLANT Left    • EYE SURGERY Left    • SINUS SURGERY  2016   • UMBILICAL HERNIA REPAIR N/A 11/18/2020    Procedure: UMBILICAL HERNIA REPAIR with mesh;  Surgeon: Fredi Kaye MD;  Location: [facility] MAIN OR;  Service: General;  Laterality: N/A;       Family History   Problem Relation Age of Onset   • Diabetes Mother    • Glaucoma Mother    • Brain cancer Mother    • Heart disease Mother    • Coronary artery disease Mother    • Cancer Mother    • Hypertension Father    • Depression Father    • Hyperlipidemia Father    • Alzheimer's disease Father    • Insomnia Father    • Other Brother         MVA    • Narcolepsy Son    • Congenital heart disease Half-Brother    • Glaucoma Half-Sister    • No Known Problems Half-Sister        Social History     Socioeconomic History   • Marital status:    • Number of children: 2   Tobacco Use   • Smoking status: Never   • Smokeless tobacco: Never   Vaping Use   • Vaping Use: Never used   Substance and Sexual Activity   • Alcohol use: Yes     Comment: Occasional   • Drug use: No   • Sexual activity: Yes     Partners: Female     Birth control/protection: None           Objective   Physical Exam  Vitals and nursing note reviewed.   Constitutional:       General: He is not in acute distress.      Appearance: He is well-developed. He is not diaphoretic.   HENT:      Head: Normocephalic and atraumatic.      Right Ear: External ear normal.      Left Ear: External ear normal.      Nose: Nose normal.      Mouth/Throat:      Mouth: Mucous membranes are moist.   Eyes:      Extraocular Movements: Extraocular movements intact.      Conjunctiva/sclera: Conjunctivae normal.      Pupils: Pupils are equal, round, and reactive to light.   Cardiovascular:      Rate and Rhythm: Normal rate and regular rhythm.      Heart sounds: Normal heart sounds.      Comments: S1, S2 audible.  Pulmonary:      Effort: Pulmonary effort is normal. No respiratory distress.      Breath sounds: Normal breath sounds. No wheezing.      Comments: On room air.  Abdominal:      General: Bowel sounds are normal. There is no distension.      Palpations: Abdomen is soft.      Tenderness: There is no abdominal tenderness. There is no guarding or rebound.   Musculoskeletal:         General: No tenderness or deformity. Normal range of motion.      Cervical back: Normal range of motion.      Comments: Patient has good range of motion of right upper extremity and right elbow.  Patient does have some pain with supination pronation of right forearm but is able to do so.  Patient has good cap refill in all fingers of right upper extremity as well as good radial and ulnar pulses of right upper extremity.  Patient has no bony tenderness over the right elbow.  Patient had negative Tinel's sign.   Skin:     General: Skin is warm.      Capillary Refill: Capillary refill takes less than 2 seconds.      Findings: No erythema or rash.   Neurological:      Mental Status: He is alert and oriented to person, place, and time.      Cranial Nerves: No cranial nerve deficit.   Psychiatric:         Mood and Affect: Mood normal.         Behavior: Behavior normal.         Procedures           ED Course      /78   Pulse 75   Temp 98 °F (36.7 °C)   Resp 16   Ht 177.8  "cm (70\")   Wt 125 kg (276 lb 3.8 oz)   SpO2 99%   BMI 39.64 kg/m²   Labs Reviewed - No data to display  No radiology results for the last day                                       MDM     Differential diagnosis: Right elbow strain, tennis elbow, carpal tunnel, not meant to be an all-inclusive list  Chart review: Sulfa antibiotic allergy, last BMI of 39.64. Patient was seen on 1/30/2023 by patient's primary care provider, PAVITHRA Rivera, and was placed on Mobic at that time and recommends tennis elbow band and referral to hand center.     EKG:  Not indicated    Imaging: Considered and offered xray to patient but patient declined as patient already had this done previously.    Labs: Not indicated  Vitals:  /78   Pulse 75   Temp 98 °F (36.7 °C)   Resp 16   Ht 177.8 cm (70\")   Wt 125 kg (276 lb 3.8 oz)   SpO2 99%   BMI 39.64 kg/m²     Medications given:    Medications   predniSONE (DELTASONE) tablet 40 mg (40 mg Oral Given 3/19/23 1212)       Procedures:  Not indicated  MDM: Patient is a 57-year-old male who comes in complaining of right elbow pain.  Patient was seen on 1/30/2023 by patient's primary care provider, PAVITHRA Rivera, and was placed on Mobic at that time and recommends tennis elbow band and referral to hand center.  I offered patient an x-ray of right elbow however patient states he is already hand and x-ray and does not want this at this time.  Patient is neurovascular intact distal to injury of right upper extremity.  Patient was given a dose of prednisone and sent home on a course of steroids in addition to muscle relaxer instructed follow-up with hand surgeon as well and was neurosurgeon given patient's history of cervical radiculopathy.  Patient does not have an emergent indication for an MRI of cervical spine or extremity.  Patient was given strict return precautions and voiced understanding. See full discharge instructions for further details.  Results and plan discussed " with patient and is agreeable with plan.    Final diagnoses:   Right elbow pain   Elbow strain, right, initial encounter       ED Disposition  ED Disposition     ED Disposition   Discharge    Condition   Stable    Comment   --             [facility] EMERGENCY DEPARTMENT  [address] [address] [phone]  Go in 1 day  As needed, If symptoms worsen    Jacinta Penn, APRN  [address]  [address]  [facility] [place] [address]  [phone]    Schedule an appointment as soon as possible for a visit in 1 week      Yobany Lundberg IV, MD  [address] [place] [id number] [phone]    Schedule an appointment as soon as possible for a visit in 1 week  for neurosurgery evaluation    Pancho Casarez MD  [address] [facility] [place] [address]  [phone]    Schedule an appointment as soon as possible for a visit in 3 days           Medication List      New Prescriptions    methocarbamol 750 MG tablet  Commonly known as: ROBAXIN  Take 1 tablet by mouth 3 (Three) Times a Day As Needed for Muscle Spasms.     methylPREDNISolone 4 MG dose pack  Commonly known as: MEDROL  Take as directed on package instructions.           Where to Get Your Medications      These medications were sent to [id number] - [place], [place] - [address] AT [facility] & [address] - [phone]  - [phone] FX  [address] [place] [address]    Phone: [phone]   · methocarbamol 750 MG tablet  · methylPREDNISolone 4 MG dose pack          Yannick Ma PA  03/19/23 2020

## 2023-03-20 ENCOUNTER — OFFICE VISIT (OUTPATIENT)
Dept: FAMILY MEDICINE CLINIC | Facility: CLINIC | Age: 58
End: 2023-03-20
Payer: COMMERCIAL

## 2023-03-20 VITALS
BODY MASS INDEX: 39.51 KG/M2 | HEART RATE: 76 BPM | WEIGHT: 276 LBS | OXYGEN SATURATION: 95 % | HEIGHT: 70 IN | DIASTOLIC BLOOD PRESSURE: 80 MMHG | SYSTOLIC BLOOD PRESSURE: 122 MMHG

## 2023-03-20 DIAGNOSIS — M25.521 RIGHT ELBOW PAIN: Primary | ICD-10-CM

## 2023-03-20 DIAGNOSIS — G56.91 NEUROPATHY, ARM, RIGHT: ICD-10-CM

## 2023-03-20 PROCEDURE — 99214 OFFICE O/P EST MOD 30 MIN: CPT | Performed by: STUDENT IN AN ORGANIZED HEALTH CARE EDUCATION/TRAINING PROGRAM

## 2023-03-20 RX ORDER — GABAPENTIN 100 MG/1
100 CAPSULE ORAL 3 TIMES DAILY
Qty: 90 CAPSULE | Refills: 1 | Status: SHIPPED | OUTPATIENT
Start: 2023-03-20 | End: 2023-04-04

## 2023-03-20 NOTE — PROGRESS NOTES
"Chief Complaint  Chief Complaint   Patient presents with   • Elbow Injury     CC: Sprained it Jan 7th when he was carrying a box spring, Right Elbow pain, and now arm pain, was seen at ED yesterday, has seen Ani several times, also went to see Pamela Bello and they said there was nothing they can do for it.      Subjective        Choco Bryant is a 57 y.o. male who presents to [facility].    History of Present Illness  Above history confirmed.  January 7th had a pop in his R elbow was told he had a sprain of his R elbow.  Went to ED yesterday and discussion of MRI was brought up.  He was given a steroid to take to help with the inflammation.    He now has neuropathic pain from his shoulder down to his hand.    This past Saturday he again popped his elbow and is now in constant pain.    He is not sure if the neuropathic pain is coming from his neck.   He says he had an MRI 10-12 yrs ago of his cervical spine that showed cervical stenosis.    He has seen a chiropractor in the past.    He recently saw hand specialist who did an XR of his elbow, no fracture identified.      Objective   /80   Pulse 76   Ht 177.8 cm (70\")   Wt 125 kg (276 lb)   SpO2 95%   BMI 39.60 kg/m²     Estimated body mass index is 39.6 kg/m² as calculated from the following:    Height as of this encounter: 177.8 cm (70\").    Weight as of this encounter: 125 kg (276 lb).     Physical Exam   GEN: In no acute distress  MSK: No joint erythema, deformity, or effusion or R elbow, shoulder or cervical spine.  Mild tp at lateral, medical epicondyles and attachment of bicep at elbow.  Full ROM of elbow and shoulder.  Negative spurling's bilaterally.    NEURO: AAO to person, place, and time. CN 2-12 intact grossly. Strength 5/5 in all 4 extremities. Sensation to light touch intact in all 4 extremities.     Result Review :              Assessment and Plan     Diagnoses and all orders for this visit:    1. Right " elbow pain (Primary)  He has had normal XR of R elbow.  No improvement with conservative management x 8 wks.  Most likely soft tissue injury and need better evaluation with MRI to guide further treatment.    He was prescribed steroid in the ED which he will  and start today.    Will also prescribe gabapentin for neuropathic pain.    -     MRI Elbow Right Without Contrast; Future    2. Neuropathy, arm, right  Neuropathic symptoms of R arm worsening.    He reports occasional symptoms down L arm.  Concern for worsening of cervical stenosis given history on previous MRI and now worsening symptoms.  Will order MRI cervical spine for further eval.  Start gabapentin 100 mg TID for neuropathic pain.    -     MRI Cervical Spine Without Contrast; Future  -     gabapentin (Neurontin) 100 MG capsule; Take 1 capsule by mouth 3 (Three) Times a Day.  Dispense: 90 capsule; Refill: 1         Follow Up   Determined by above imaging.

## 2023-03-21 ENCOUNTER — TELEPHONE (OUTPATIENT)
Dept: FAMILY MEDICINE CLINIC | Facility: CLINIC | Age: 58
End: 2023-03-21
Payer: COMMERCIAL

## 2023-03-21 NOTE — TELEPHONE ENCOUNTER
[facility] [place] for [facility] therapy and chiropractic on his right elbow.  Said he discussed this with you but it has not been submitted yet.

## 2023-03-23 DIAGNOSIS — F33.41 RECURRENT MAJOR DEPRESSIVE DISORDER, IN PARTIAL REMISSION: Chronic | ICD-10-CM

## 2023-03-23 RX ORDER — ARIPIPRAZOLE 2 MG/1
2 TABLET ORAL DAILY
Qty: 30 TABLET | Refills: 3 | Status: SHIPPED | OUTPATIENT
Start: 2023-03-23

## 2023-03-27 DIAGNOSIS — G47.00 INSOMNIA, UNSPECIFIED TYPE: ICD-10-CM

## 2023-03-28 RX ORDER — CLONAZEPAM 2 MG/1
2 TABLET ORAL NIGHTLY PRN
Qty: 30 TABLET | Refills: 0 | Status: SHIPPED | OUTPATIENT
Start: 2023-03-28

## 2023-04-03 ENCOUNTER — TELEPHONE (OUTPATIENT)
Dept: FAMILY MEDICINE CLINIC | Facility: CLINIC | Age: 58
End: 2023-04-03
Payer: COMMERCIAL

## 2023-04-03 DIAGNOSIS — M25.521 RIGHT ELBOW PAIN: ICD-10-CM

## 2023-04-03 DIAGNOSIS — G56.91 NEUROPATHY, ARM, RIGHT: ICD-10-CM

## 2023-04-03 DIAGNOSIS — M48.02 SPINAL STENOSIS OF CERVICAL REGION: Primary | ICD-10-CM

## 2023-04-03 DIAGNOSIS — S46.219A BICEPS TENDON TEAR: Primary | ICD-10-CM

## 2023-04-03 NOTE — TELEPHONE ENCOUNTER
Caller: Choco Bryant    Relationship: Self    Best call back number: [phone]    What test was performed: CERVICAL AND ELBOW MRI     When was the test performed: 04/01    Where was the test performed: [facility]    Additional notes: PATIENT STATES THAT HE RECEIVED HIS CERVICAL MRI RESULTS, BUT IS STILL WAITING ON HIS ELBOW MRI RESULTS. PLEASE CONTACT PATIENT WITH THESE RESULTS ASAP AND ADVISE. PATIENT WOULD LIKE TO KNOW WITH THE RESULTS FROM THE CERVICAL, IS THE GABAPENTIN 100 MG ENOUGH OR DOES THIS MEDICATION NEED TO BE ADJUSTED?

## 2023-04-04 DIAGNOSIS — G56.91 NEUROPATHY, ARM, RIGHT: ICD-10-CM

## 2023-04-04 DIAGNOSIS — M25.521 RIGHT ELBOW PAIN: ICD-10-CM

## 2023-04-04 RX ORDER — GABAPENTIN 100 MG/1
300 CAPSULE ORAL 3 TIMES DAILY
Qty: 90 CAPSULE | Refills: 3 | Status: SHIPPED | OUTPATIENT
Start: 2023-04-04

## 2023-04-04 NOTE — TELEPHONE ENCOUNTER
I called and spoke with Choco regarding his MRIs.  Referral to neurosurgery sent by Jacinta for cervical canal stenosis.  I am referring to orthopedic surgery for torn distal biceps tendon.  We are increasing his gabapentin to 300 mg TID.

## 2023-04-04 NOTE — TELEPHONE ENCOUNTER
RESULTS OF MRI ARE IN MYCHART AND PATIENT WOULD LIKE A CALL TO GO OVER. PLEASE ADVISE [phone] PATIENT IS CONCERNED.

## 2023-04-06 ENCOUNTER — OFFICE VISIT (OUTPATIENT)
Dept: ORTHOPEDIC SURGERY | Facility: CLINIC | Age: 58
End: 2023-04-06
Payer: COMMERCIAL

## 2023-04-06 ENCOUNTER — PREP FOR SURGERY (OUTPATIENT)
Dept: OTHER | Facility: HOSPITAL | Age: 58
End: 2023-04-06
Payer: COMMERCIAL

## 2023-04-06 VITALS — HEIGHT: 70 IN | BODY MASS INDEX: 39.65 KG/M2 | HEART RATE: 83 BPM | WEIGHT: 277 LBS

## 2023-04-06 DIAGNOSIS — S46.211A RUPTURE OF RIGHT DISTAL BICEPS TENDON, INITIAL ENCOUNTER: Primary | ICD-10-CM

## 2023-04-06 PROCEDURE — 99204 OFFICE O/P NEW MOD 45 MIN: CPT | Performed by: ORTHOPAEDIC SURGERY

## 2023-04-06 NOTE — PROGRESS NOTES
Patient ID: Choco Bryant is a 58 y.o. male.    Chief Complaint:    Chief Complaint   Patient presents with   • Right Elbow - Initial Evaluation, Pain     Pain 3-9 DOI 1/7/2023       HPI:  This is a 58-year-old gentleman injured his right elbow picking up a box spring in January of this year.  He is continue to suffer pain over the antecubital fossa and with forearm rotation since that time.  No treatment to date other than oral medication and activity modification  Past Medical History:   Diagnosis Date   • ADHD (attention deficit hyperactivity disorder)    • Anxiety    • AV mark tachycardia 05/2017    [facility]   • Cataract 2020    Cornea transplant   • Colon polyp 2923    Only one small begnine   • Depression    • GERD (gastroesophageal reflux disease)    • History of atrial tachycardia    • History of supraventricular tachycardia     av node reentrant tachycardia s/p ablation   • Hyperlipidemia    • Hypertension    • MRSA (methicillin resistant staph aureus) culture positive     eye   • Sleep apnea        Past Surgical History:   Procedure Laterality Date   • CARDIAC ABLATION  05/2017   • CARDIAC CATHETERIZATION  2011   • COLONOSCOPY     • CORNEAL TRANSPLANT Left    • EYE SURGERY Left    • SINUS SURGERY  2016   • UMBILICAL HERNIA REPAIR N/A 11/18/2020    Procedure: UMBILICAL HERNIA REPAIR with mesh;  Surgeon: Fredi Kaye MD;  Location: [facility] MAIN OR;  Service: General;  Laterality: N/A;       Family History   Problem Relation Age of Onset   • Diabetes Mother    • Glaucoma Mother    • Brain cancer Mother    • Heart disease Mother    • Coronary artery disease Mother    • Cancer Mother    • Hypertension Father    • Depression Father    • Hyperlipidemia Father    • Alzheimer's disease Father    • Insomnia Father    • Other Brother         MVA    • Narcolepsy Son    • Congenital heart disease Half-Brother    • Glaucoma Half-Sister    • No Known Problems Half-Sister   "         Social History     Occupational History   • Occupation: Teaches Art Classes     Comment: [employer]   Tobacco Use   • Smoking status: Never   • Smokeless tobacco: Never   Vaping Use   • Vaping Use: Never used   Substance and Sexual Activity   • Alcohol use: Yes     Comment: Wine vwrt occasionaly   • Drug use: No   • Sexual activity: Yes     Partners: Female     Birth control/protection: None      Review of Systems   Cardiovascular: Negative for chest pain.   Musculoskeletal: Positive for arthralgias.       Objective:    Pulse 83   Ht 177.8 cm (70\")   Wt 126 kg (277 lb)   BMI 39.75 kg/m²     Physical Examination:  Right elbow demonstrates pain in the antecubital fossa he has defect in the distal bicep insertion into the forearm he has pain and weakness on resisted elbow flexion and forearm supination but full passive range of motion of the elbow.  Sensory and motor exam are intact all distributions. Radial pulse is palpable and capillary refill is less than two seconds to all digits.    Imaging:  MRI reviewed my interpretation is of full-thickness approximately 3 cm retracted tear of the distal bicep tendon    Assessment:  Subacute right distal bicep tendon tear    Plan:  Treatment options discussed he would like to proceed with distal bicep tendon repair.  Discussed the possible need for allograft reconstruction and possible extended recovery with the subacute nature of his repair  Risks and benefits, specifically risks of bleeding, scar, infection, fracture, stiffness, retear, nerve, tendon or artery damage, the need for further surgery, DVT, and loss of life or limb and rehab were discussed. All questions were answered and addressed.      Procedures         Disclaimer: Part of this note may be an electronic transcription/translation of spoken language to printed text using the Dragon Dictation System  "

## 2023-04-06 NOTE — H&P (VIEW-ONLY)
Patient ID: Choco Bryant is a 58 y.o. male.    Chief Complaint:    Chief Complaint   Patient presents with   • Right Elbow - Initial Evaluation, Pain     Pain 3-9 DOI 1/7/2023       HPI:  This is a 58-year-old gentleman injured his right elbow picking up a box spring in January of this year.  He is continue to suffer pain over the antecubital fossa and with forearm rotation since that time.  No treatment to date other than oral medication and activity modification  Past Medical History:   Diagnosis Date   • ADHD (attention deficit hyperactivity disorder)    • Anxiety    • AV mark tachycardia 05/2017    [facility]   • Cataract 2020    Cornea transplant   • Colon polyp 2923    Only one small begnine   • Depression    • GERD (gastroesophageal reflux disease)    • History of atrial tachycardia    • History of supraventricular tachycardia     av node reentrant tachycardia s/p ablation   • Hyperlipidemia    • Hypertension    • MRSA (methicillin resistant staph aureus) culture positive     eye   • Sleep apnea        Past Surgical History:   Procedure Laterality Date   • CARDIAC ABLATION  05/2017   • CARDIAC CATHETERIZATION  2011   • COLONOSCOPY     • CORNEAL TRANSPLANT Left    • EYE SURGERY Left    • SINUS SURGERY  2016   • UMBILICAL HERNIA REPAIR N/A 11/18/2020    Procedure: UMBILICAL HERNIA REPAIR with mesh;  Surgeon: Fredi Kaye MD;  Location: [facility] MAIN OR;  Service: General;  Laterality: N/A;       Family History   Problem Relation Age of Onset   • Diabetes Mother    • Glaucoma Mother    • Brain cancer Mother    • Heart disease Mother    • Coronary artery disease Mother    • Cancer Mother    • Hypertension Father    • Depression Father    • Hyperlipidemia Father    • Alzheimer's disease Father    • Insomnia Father    • Other Brother         MVA    • Narcolepsy Son    • Congenital heart disease Half-Brother    • Glaucoma Half-Sister    • No Known Problems Half-Sister   "         Social History     Occupational History   • Occupation: Teaches Art Classes     Comment: [employer]   Tobacco Use   • Smoking status: Never   • Smokeless tobacco: Never   Vaping Use   • Vaping Use: Never used   Substance and Sexual Activity   • Alcohol use: Yes     Comment: Wine vwrt occasionaly   • Drug use: No   • Sexual activity: Yes     Partners: Female     Birth control/protection: None      Review of Systems   Cardiovascular: Negative for chest pain.   Musculoskeletal: Positive for arthralgias.       Objective:    Pulse 83   Ht 177.8 cm (70\")   Wt 126 kg (277 lb)   BMI 39.75 kg/m²     Physical Examination:  Right elbow demonstrates pain in the antecubital fossa he has defect in the distal bicep insertion into the forearm he has pain and weakness on resisted elbow flexion and forearm supination but full passive range of motion of the elbow.  Sensory and motor exam are intact all distributions. Radial pulse is palpable and capillary refill is less than two seconds to all digits.    Imaging:  MRI reviewed my interpretation is of full-thickness approximately 3 cm retracted tear of the distal bicep tendon    Assessment:  Subacute right distal bicep tendon tear    Plan:  Treatment options discussed he would like to proceed with distal bicep tendon repair.  Discussed the possible need for allograft reconstruction and possible extended recovery with the subacute nature of his repair  Risks and benefits, specifically risks of bleeding, scar, infection, fracture, stiffness, retear, nerve, tendon or artery damage, the need for further surgery, DVT, and loss of life or limb and rehab were discussed. All questions were answered and addressed.      Procedures         Disclaimer: Part of this note may be an electronic transcription/translation of spoken language to printed text using the Dragon Dictation System  "

## 2023-04-07 RX ORDER — CHLORAL HYDRATE 500 MG
1000 CAPSULE ORAL
COMMUNITY

## 2023-04-07 NOTE — PROGRESS NOTES
"Subjective   History of Present Illness: Choco Bryant is a 58 y.o. male is being seen for consultation today at the request of PAVITHRA Pavon for neck pain. Today patient reports bilateral arm which travels into his hands ocassionally.  Patient will notice tingling and numbness in his hands as well usually right more than left and primarily in the second and third digits.  Patient denies any difficulty using his hands or dropping things.  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  He has mild neck pain.  In the past he has used chiropractors and physical therapy to help with neck issues or arm pain that occasionally come up.    Chief Complaint   Patient presents with   • Neck Pain     New evaluation          Previous Treatment:    The following portions of the patient's history were reviewed and updated as appropriate: allergies, current medications, past family history, past medical history, past social history, past surgical history and problem list.    Review of Systems   Constitutional: Positive for activity change.   HENT: Negative.    Eyes: Negative.    Respiratory: Negative.    Cardiovascular: Negative.    Gastrointestinal: Negative.    Endocrine: Negative.    Genitourinary: Negative.    Musculoskeletal: Positive for myalgias (bilateral arms) and neck pain (soreness).   Skin: Negative.    Allergic/Immunologic: Negative.    Neurological: Positive for numbness.   Hematological: Negative.    Psychiatric/Behavioral: Negative.        Objective     /90   Pulse 77   Ht 177.8 cm (70\")   Wt 126 kg (278 lb 6.4 oz)   SpO2 100%   BMI 39.95 kg/m²    Body mass index is 39.95 kg/m².  Vitals:    04/10/23 1436   PainSc:   4           Neurologic Exam    Assessment & Plan   Independent Review of Radiographic Studies:      I personally reviewed and interpreted the images from the following studies.    MRI cervical spine: Degenerative " changes most severe from C5-7 where there is moderate to severe central and foraminal stenosis.  No cord signal change.  In comparison with MRI from 2011 there is been some worsening of the stenosis    Medical Decision Making:      Choco Bryant is a 58 y.o. male with cervical stenosis and radiculopathy symptoms and possibly some early myelopathy symptoms.  I discussed options with the patient including waiting and watching for worsening of symptoms versus surgical intervention now to prevent the development of myelopathy and decrease the risk of spinal cord injury.  At this time, the patient would like to take a wait-and-see approach.  I think this is reasonable given the fact that he has had significant stenosis since 2011 and has not developed overt myelopathy symptoms.  Should he develop worsening symptoms or decide that he would like to proceed with surgery he can follow-up with me.  He does understand that development of symptoms may result in permanent deficit regardless of surgical intervention and he also understands that he has had a increased risk of spinal cord injury.    Diagnoses and all orders for this visit:    1. Cervical stenosis of spine (Primary)    2. Cervical radiculopathy    3. DDD (degenerative disc disease), cervical      No follow-ups on file.    This patient was examined wearing appropriate personal protective equipment.                      Dr. Yobany Lundberg IV    04/10/23  15:13 EDT

## 2023-04-10 ENCOUNTER — LAB (OUTPATIENT)
Dept: LAB | Facility: HOSPITAL | Age: 58
End: 2023-04-10
Payer: COMMERCIAL

## 2023-04-10 ENCOUNTER — HOSPITAL ENCOUNTER (OUTPATIENT)
Dept: CARDIOLOGY | Facility: HOSPITAL | Age: 58
Discharge: HOME | End: 2023-04-10
Payer: COMMERCIAL

## 2023-04-10 ENCOUNTER — OFFICE VISIT (OUTPATIENT)
Dept: NEUROSURGERY | Facility: CLINIC | Age: 58
End: 2023-04-10
Payer: COMMERCIAL

## 2023-04-10 VITALS
BODY MASS INDEX: 39.86 KG/M2 | SYSTOLIC BLOOD PRESSURE: 147 MMHG | DIASTOLIC BLOOD PRESSURE: 90 MMHG | WEIGHT: 278.4 LBS | HEIGHT: 70 IN | OXYGEN SATURATION: 100 % | HEART RATE: 77 BPM

## 2023-04-10 DIAGNOSIS — M48.02 CERVICAL STENOSIS OF SPINE: Primary | ICD-10-CM

## 2023-04-10 DIAGNOSIS — M54.12 CERVICAL RADICULOPATHY: ICD-10-CM

## 2023-04-10 DIAGNOSIS — S46.211A RUPTURE OF RIGHT DISTAL BICEPS TENDON, INITIAL ENCOUNTER: ICD-10-CM

## 2023-04-10 DIAGNOSIS — M50.30 DDD (DEGENERATIVE DISC DISEASE), CERVICAL: ICD-10-CM

## 2023-04-10 LAB
ABO GROUP BLD: NORMAL
ANION GAP SERPL CALCULATED.3IONS-SCNC: 12.6 MMOL/L (ref 5–15)
APTT PPP: 26.1 SECONDS (ref 24–31)
BASOPHILS # BLD AUTO: 0.05 10*3/MM3 (ref 0–0.2)
BASOPHILS NFR BLD AUTO: 0.9 % (ref 0–1.5)
BLD GP AB SCN SERPL QL: NEGATIVE
BUN SERPL-MCNC: 16 MG/DL (ref 6–20)
BUN/CREAT SERPL: 15.4 (ref 7–25)
CALCIUM SPEC-SCNC: 9.6 MG/DL (ref 8.6–10.5)
CHLORIDE SERPL-SCNC: 103 MMOL/L (ref 98–107)
CO2 SERPL-SCNC: 26.4 MMOL/L (ref 22–29)
CREAT SERPL-MCNC: 1.04 MG/DL (ref 0.76–1.27)
DEPRECATED RDW RBC AUTO: 39.6 FL (ref 37–54)
EGFRCR SERPLBLD CKD-EPI 2021: 83.2 ML/MIN/1.73
EOSINOPHIL # BLD AUTO: 0.1 10*3/MM3 (ref 0–0.4)
EOSINOPHIL NFR BLD AUTO: 1.9 % (ref 0.3–6.2)
ERYTHROCYTE [DISTWIDTH] IN BLOOD BY AUTOMATED COUNT: 12.6 % (ref 12.3–15.4)
GLUCOSE SERPL-MCNC: 89 MG/DL (ref 65–99)
HCT VFR BLD AUTO: 42.3 % (ref 37.5–51)
HGB BLD-MCNC: 14.4 G/DL (ref 13–17.7)
IMM GRANULOCYTES # BLD AUTO: 0.04 10*3/MM3 (ref 0–0.05)
IMM GRANULOCYTES NFR BLD AUTO: 0.8 % (ref 0–0.5)
INR PPP: 1.03 (ref 0.93–1.1)
LYMPHOCYTES # BLD AUTO: 1.66 10*3/MM3 (ref 0.7–3.1)
LYMPHOCYTES NFR BLD AUTO: 31.1 % (ref 19.6–45.3)
MCH RBC QN AUTO: 29.5 PG (ref 26.6–33)
MCHC RBC AUTO-ENTMCNC: 34 G/DL (ref 31.5–35.7)
MCV RBC AUTO: 86.7 FL (ref 79–97)
MONOCYTES # BLD AUTO: 0.5 10*3/MM3 (ref 0.1–0.9)
MONOCYTES NFR BLD AUTO: 9.4 % (ref 5–12)
NEUTROPHILS NFR BLD AUTO: 2.98 10*3/MM3 (ref 1.7–7)
NEUTROPHILS NFR BLD AUTO: 55.9 % (ref 42.7–76)
NRBC BLD AUTO-RTO: 0 /100 WBC (ref 0–0.2)
PLATELET # BLD AUTO: 232 10*3/MM3 (ref 140–450)
PMV BLD AUTO: 10.6 FL (ref 6–12)
POTASSIUM SERPL-SCNC: 3.9 MMOL/L (ref 3.5–5.2)
PROTHROMBIN TIME: 10.6 SECONDS (ref 9.6–11.7)
QT INTERVAL: 385 MS
RBC # BLD AUTO: 4.88 10*6/MM3 (ref 4.14–5.8)
RH BLD: POSITIVE
SODIUM SERPL-SCNC: 142 MMOL/L (ref 136–145)
T&S EXPIRATION DATE: NORMAL
WBC NRBC COR # BLD: 5.33 10*3/MM3 (ref 3.4–10.8)

## 2023-04-10 PROCEDURE — 86900 BLOOD TYPING SEROLOGIC ABO: CPT

## 2023-04-10 PROCEDURE — 85730 THROMBOPLASTIN TIME PARTIAL: CPT

## 2023-04-10 PROCEDURE — 86901 BLOOD TYPING SEROLOGIC RH(D): CPT

## 2023-04-10 PROCEDURE — 85610 PROTHROMBIN TIME: CPT

## 2023-04-10 PROCEDURE — 99203 OFFICE O/P NEW LOW 30 MIN: CPT | Performed by: NEUROLOGICAL SURGERY

## 2023-04-10 PROCEDURE — 80048 BASIC METABOLIC PNL TOTAL CA: CPT

## 2023-04-10 PROCEDURE — 86850 RBC ANTIBODY SCREEN: CPT

## 2023-04-10 PROCEDURE — 36415 COLL VENOUS BLD VENIPUNCTURE: CPT

## 2023-04-10 PROCEDURE — 93005 ELECTROCARDIOGRAM TRACING: CPT | Performed by: ORTHOPAEDIC SURGERY

## 2023-04-10 PROCEDURE — 85025 COMPLETE CBC W/AUTO DIFF WBC: CPT

## 2023-04-10 PROCEDURE — 93010 ELECTROCARDIOGRAM REPORT: CPT | Performed by: INTERNAL MEDICINE

## 2023-04-13 RX ORDER — CEPHALEXIN 500 MG/1
500 CAPSULE ORAL 4 TIMES DAILY
Qty: 4 CAPSULE | Refills: 0 | Status: SHIPPED | OUTPATIENT
Start: 2023-04-13 | End: 2023-04-14

## 2023-04-13 RX ORDER — NAPROXEN 500 MG/1
500 TABLET ORAL 2 TIMES DAILY WITH MEALS
Qty: 28 TABLET | Refills: 0 | Status: SHIPPED | OUTPATIENT
Start: 2023-04-13

## 2023-04-13 RX ORDER — OXYCODONE HYDROCHLORIDE AND ACETAMINOPHEN 5; 325 MG/1; MG/1
1 TABLET ORAL EVERY 6 HOURS PRN
Qty: 28 TABLET | Refills: 0 | Status: SHIPPED | OUTPATIENT
Start: 2023-04-13

## 2023-04-13 RX ORDER — PROMETHAZINE HYDROCHLORIDE 12.5 MG/1
12.5 TABLET ORAL EVERY 6 HOURS PRN
Qty: 21 TABLET | Refills: 1 | Status: SHIPPED | OUTPATIENT
Start: 2023-04-13

## 2023-04-14 ENCOUNTER — ANESTHESIA EVENT (OUTPATIENT)
Dept: PERIOP | Facility: HOSPITAL | Age: 58
End: 2023-04-14
Payer: COMMERCIAL

## 2023-04-14 ENCOUNTER — ANESTHESIA (OUTPATIENT)
Dept: PERIOP | Facility: HOSPITAL | Age: 58
End: 2023-04-14
Payer: COMMERCIAL

## 2023-04-14 ENCOUNTER — APPOINTMENT (OUTPATIENT)
Dept: GENERAL RADIOLOGY | Facility: HOSPITAL | Age: 58
End: 2023-04-14
Payer: COMMERCIAL

## 2023-04-14 ENCOUNTER — HOSPITAL ENCOUNTER (OUTPATIENT)
Facility: HOSPITAL | Age: 58
Setting detail: HOSPITAL OUTPATIENT SURGERY
Discharge: HOME | End: 2023-04-14
Attending: ORTHOPAEDIC SURGERY | Admitting: ORTHOPAEDIC SURGERY
Payer: COMMERCIAL

## 2023-04-14 VITALS
BODY MASS INDEX: 38.94 KG/M2 | DIASTOLIC BLOOD PRESSURE: 73 MMHG | OXYGEN SATURATION: 95 % | RESPIRATION RATE: 19 BRPM | WEIGHT: 272 LBS | TEMPERATURE: 97.1 F | HEART RATE: 101 BPM | SYSTOLIC BLOOD PRESSURE: 124 MMHG | HEIGHT: 70 IN

## 2023-04-14 DIAGNOSIS — S46.211A RUPTURE OF RIGHT DISTAL BICEPS TENDON, INITIAL ENCOUNTER: Primary | ICD-10-CM

## 2023-04-14 PROCEDURE — 25010000002 MIDAZOLAM PER 1 MG: Performed by: ANESTHESIOLOGY

## 2023-04-14 PROCEDURE — 24342 REPAIR OF RUPTURED TENDON: CPT | Performed by: ORTHOPAEDIC SURGERY

## 2023-04-14 PROCEDURE — 25010000002 PHENYLEPHRINE 10 MG/ML SOLUTION 5 ML VIAL

## 2023-04-14 PROCEDURE — 24342 REPAIR OF RUPTURED TENDON: CPT | Performed by: PHYSICIAN ASSISTANT

## 2023-04-14 PROCEDURE — C1713 ANCHOR/SCREW BN/BN,TIS/BN: HCPCS | Performed by: ORTHOPAEDIC SURGERY

## 2023-04-14 PROCEDURE — 25010000002 ROPIVACAINE PER 1 MG: Performed by: ANESTHESIOLOGY

## 2023-04-14 PROCEDURE — 25010000002 FENTANYL CITRATE (PF) 50 MCG/ML SOLUTION: Performed by: ANESTHESIOLOGY

## 2023-04-14 PROCEDURE — 25010000002 PROPOFOL 200 MG/20ML EMULSION

## 2023-04-14 PROCEDURE — P9041 ALBUMIN (HUMAN),5%, 50ML: HCPCS

## 2023-04-14 PROCEDURE — 25010000002 ALBUMIN HUMAN 5% PER 50 ML

## 2023-04-14 PROCEDURE — 76000 FLUOROSCOPY <1 HR PHYS/QHP: CPT

## 2023-04-14 PROCEDURE — 25010000002 DEXAMETHASONE PER 1 MG: Performed by: ANESTHESIOLOGY

## 2023-04-14 PROCEDURE — 25010000002 ONDANSETRON PER 1 MG

## 2023-04-14 DEVICE — KT BUTTON REPR SHLDR DIST PEEK 7X10MM: Type: IMPLANTABLE DEVICE | Site: ARM | Status: FUNCTIONAL

## 2023-04-14 RX ORDER — HYDROCODONE BITARTRATE AND ACETAMINOPHEN 7.5; 325 MG/1; MG/1
1 TABLET ORAL ONCE AS NEEDED
Status: DISCONTINUED | OUTPATIENT
Start: 2023-04-14 | End: 2023-04-14 | Stop reason: HOSPADM

## 2023-04-14 RX ORDER — FENTANYL CITRATE 50 UG/ML
25 INJECTION, SOLUTION INTRAMUSCULAR; INTRAVENOUS
Status: DISCONTINUED | OUTPATIENT
Start: 2023-04-14 | End: 2023-04-14 | Stop reason: HOSPADM

## 2023-04-14 RX ORDER — FENTANYL CITRATE 50 UG/ML
50 INJECTION, SOLUTION INTRAMUSCULAR; INTRAVENOUS
Status: DISCONTINUED | OUTPATIENT
Start: 2023-04-14 | End: 2023-04-14 | Stop reason: HOSPADM

## 2023-04-14 RX ORDER — FENTANYL CITRATE 50 UG/ML
INJECTION, SOLUTION INTRAMUSCULAR; INTRAVENOUS
Status: COMPLETED | OUTPATIENT
Start: 2023-04-14 | End: 2023-04-14

## 2023-04-14 RX ORDER — HYDRALAZINE HYDROCHLORIDE 20 MG/ML
5 INJECTION INTRAMUSCULAR; INTRAVENOUS
Status: DISCONTINUED | OUTPATIENT
Start: 2023-04-14 | End: 2023-04-14 | Stop reason: HOSPADM

## 2023-04-14 RX ORDER — ALBUMIN, HUMAN INJ 5% 5 %
SOLUTION INTRAVENOUS CONTINUOUS PRN
Status: DISCONTINUED | OUTPATIENT
Start: 2023-04-14 | End: 2023-04-14 | Stop reason: SURG

## 2023-04-14 RX ORDER — PHENYLEPHRINE HCL IN 0.9% NACL 1 MG/10 ML
SYRINGE (ML) INTRAVENOUS AS NEEDED
Status: DISCONTINUED | OUTPATIENT
Start: 2023-04-14 | End: 2023-04-14 | Stop reason: SURG

## 2023-04-14 RX ORDER — ROPIVACAINE HYDROCHLORIDE 5 MG/ML
INJECTION, SOLUTION EPIDURAL; INFILTRATION; PERINEURAL
Status: COMPLETED | OUTPATIENT
Start: 2023-04-14 | End: 2023-04-14

## 2023-04-14 RX ORDER — ONDANSETRON 2 MG/ML
INJECTION INTRAMUSCULAR; INTRAVENOUS AS NEEDED
Status: DISCONTINUED | OUTPATIENT
Start: 2023-04-14 | End: 2023-04-14 | Stop reason: SURG

## 2023-04-14 RX ORDER — DIPHENHYDRAMINE HYDROCHLORIDE 50 MG/ML
12.5 INJECTION INTRAMUSCULAR; INTRAVENOUS ONCE AS NEEDED
Status: DISCONTINUED | OUTPATIENT
Start: 2023-04-14 | End: 2023-04-14 | Stop reason: HOSPADM

## 2023-04-14 RX ORDER — DEXAMETHASONE SODIUM PHOSPHATE 4 MG/ML
INJECTION, SOLUTION INTRA-ARTICULAR; INTRALESIONAL; INTRAMUSCULAR; INTRAVENOUS; SOFT TISSUE
Status: COMPLETED | OUTPATIENT
Start: 2023-04-14 | End: 2023-04-14

## 2023-04-14 RX ORDER — SODIUM CHLORIDE, SODIUM LACTATE, POTASSIUM CHLORIDE, CALCIUM CHLORIDE 600; 310; 30; 20 MG/100ML; MG/100ML; MG/100ML; MG/100ML
1000 INJECTION, SOLUTION INTRAVENOUS CONTINUOUS
Status: DISCONTINUED | OUTPATIENT
Start: 2023-04-14 | End: 2023-04-14 | Stop reason: HOSPADM

## 2023-04-14 RX ORDER — LABETALOL HYDROCHLORIDE 5 MG/ML
5 INJECTION, SOLUTION INTRAVENOUS
Status: DISCONTINUED | OUTPATIENT
Start: 2023-04-14 | End: 2023-04-14 | Stop reason: HOSPADM

## 2023-04-14 RX ORDER — FLUMAZENIL 0.1 MG/ML
0.1 INJECTION INTRAVENOUS AS NEEDED
Status: DISCONTINUED | OUTPATIENT
Start: 2023-04-14 | End: 2023-04-14 | Stop reason: HOSPADM

## 2023-04-14 RX ORDER — NALOXONE HCL 0.4 MG/ML
0.4 VIAL (ML) INJECTION AS NEEDED
Status: DISCONTINUED | OUTPATIENT
Start: 2023-04-14 | End: 2023-04-14 | Stop reason: HOSPADM

## 2023-04-14 RX ORDER — SODIUM CHLORIDE 0.9 % (FLUSH) 0.9 %
10 SYRINGE (ML) INJECTION AS NEEDED
Status: DISCONTINUED | OUTPATIENT
Start: 2023-04-14 | End: 2023-04-14 | Stop reason: HOSPADM

## 2023-04-14 RX ORDER — MEPERIDINE HYDROCHLORIDE 25 MG/ML
12.5 INJECTION INTRAMUSCULAR; INTRAVENOUS; SUBCUTANEOUS
Status: DISCONTINUED | OUTPATIENT
Start: 2023-04-14 | End: 2023-04-14 | Stop reason: HOSPADM

## 2023-04-14 RX ORDER — MIDAZOLAM HYDROCHLORIDE 1 MG/ML
INJECTION INTRAMUSCULAR; INTRAVENOUS
Status: COMPLETED | OUTPATIENT
Start: 2023-04-14 | End: 2023-04-14

## 2023-04-14 RX ORDER — PROPOFOL 10 MG/ML
INJECTION, EMULSION INTRAVENOUS AS NEEDED
Status: DISCONTINUED | OUTPATIENT
Start: 2023-04-14 | End: 2023-04-14 | Stop reason: SURG

## 2023-04-14 RX ORDER — PROCHLORPERAZINE EDISYLATE 5 MG/ML
10 INJECTION INTRAMUSCULAR; INTRAVENOUS ONCE AS NEEDED
Status: DISCONTINUED | OUTPATIENT
Start: 2023-04-14 | End: 2023-04-14 | Stop reason: HOSPADM

## 2023-04-14 RX ORDER — ROCURONIUM BROMIDE 10 MG/ML
INJECTION, SOLUTION INTRAVENOUS AS NEEDED
Status: DISCONTINUED | OUTPATIENT
Start: 2023-04-14 | End: 2023-04-14 | Stop reason: SURG

## 2023-04-14 RX ORDER — EPHEDRINE SULFATE 5 MG/ML
INJECTION INTRAVENOUS AS NEEDED
Status: DISCONTINUED | OUTPATIENT
Start: 2023-04-14 | End: 2023-04-14 | Stop reason: SURG

## 2023-04-14 RX ORDER — LIDOCAINE HYDROCHLORIDE 10 MG/ML
0.5 INJECTION, SOLUTION INFILTRATION; PERINEURAL ONCE AS NEEDED
Status: DISCONTINUED | OUTPATIENT
Start: 2023-04-14 | End: 2023-04-14 | Stop reason: HOSPADM

## 2023-04-14 RX ORDER — DEXAMETHASONE SODIUM PHOSPHATE 4 MG/ML
INJECTION, SOLUTION INTRA-ARTICULAR; INTRALESIONAL; INTRAMUSCULAR; INTRAVENOUS; SOFT TISSUE AS NEEDED
Status: DISCONTINUED | OUTPATIENT
Start: 2023-04-14 | End: 2023-04-14 | Stop reason: SURG

## 2023-04-14 RX ORDER — LIDOCAINE HYDROCHLORIDE 10 MG/ML
INJECTION, SOLUTION EPIDURAL; INFILTRATION; INTRACAUDAL; PERINEURAL AS NEEDED
Status: DISCONTINUED | OUTPATIENT
Start: 2023-04-14 | End: 2023-04-14 | Stop reason: SURG

## 2023-04-14 RX ORDER — IPRATROPIUM BROMIDE AND ALBUTEROL SULFATE 2.5; .5 MG/3ML; MG/3ML
3 SOLUTION RESPIRATORY (INHALATION) ONCE AS NEEDED
Status: DISCONTINUED | OUTPATIENT
Start: 2023-04-14 | End: 2023-04-14 | Stop reason: HOSPADM

## 2023-04-14 RX ORDER — ONDANSETRON 2 MG/ML
4 INJECTION INTRAMUSCULAR; INTRAVENOUS ONCE AS NEEDED
Status: DISCONTINUED | OUTPATIENT
Start: 2023-04-14 | End: 2023-04-14 | Stop reason: HOSPADM

## 2023-04-14 RX ORDER — VASOPRESSIN 20 U/ML
INJECTION PARENTERAL AS NEEDED
Status: DISCONTINUED | OUTPATIENT
Start: 2023-04-14 | End: 2023-04-14 | Stop reason: SURG

## 2023-04-14 RX ORDER — GLYCOPYRROLATE 0.2 MG/ML
INJECTION INTRAMUSCULAR; INTRAVENOUS AS NEEDED
Status: DISCONTINUED | OUTPATIENT
Start: 2023-04-14 | End: 2023-04-14 | Stop reason: SURG

## 2023-04-14 RX ORDER — CEFAZOLIN SODIUM IN 0.9 % NACL 3 G/100 ML
3 INTRAVENOUS SOLUTION, PIGGYBACK (ML) INTRAVENOUS ONCE
Status: COMPLETED | OUTPATIENT
Start: 2023-04-14 | End: 2023-04-14

## 2023-04-14 RX ADMIN — MIDAZOLAM 2 MG: 1 INJECTION INTRAMUSCULAR; INTRAVENOUS at 12:40

## 2023-04-14 RX ADMIN — DEXAMETHASONE SODIUM PHOSPHATE 4 MG: 4 INJECTION, SOLUTION INTRAMUSCULAR; INTRAVENOUS at 12:40

## 2023-04-14 RX ADMIN — VASOPRESSIN 2 UNITS: 20 INJECTION, SOLUTION INTRAMUSCULAR; SUBCUTANEOUS at 14:01

## 2023-04-14 RX ADMIN — GLYCOPYRROLATE 0.2 MG: 0.2 INJECTION INTRAMUSCULAR; INTRAVENOUS at 14:24

## 2023-04-14 RX ADMIN — DEXAMETHASONE SODIUM PHOSPHATE 4 MG: 4 INJECTION, SOLUTION INTRA-ARTICULAR; INTRALESIONAL; INTRAMUSCULAR; INTRAVENOUS; SOFT TISSUE at 13:55

## 2023-04-14 RX ADMIN — VASOPRESSIN 4 UNITS: 20 INJECTION, SOLUTION INTRAMUSCULAR; SUBCUTANEOUS at 14:12

## 2023-04-14 RX ADMIN — Medication 100 MCG: at 13:52

## 2023-04-14 RX ADMIN — VASOPRESSIN 2 UNITS: 20 INJECTION, SOLUTION INTRAMUSCULAR; SUBCUTANEOUS at 13:54

## 2023-04-14 RX ADMIN — ONDANSETRON 4 MG: 2 INJECTION INTRAMUSCULAR; INTRAVENOUS at 14:41

## 2023-04-14 RX ADMIN — FENTANYL CITRATE 50 MCG: 50 INJECTION, SOLUTION INTRAMUSCULAR; INTRAVENOUS at 13:23

## 2023-04-14 RX ADMIN — LIDOCAINE HYDROCHLORIDE 50 MG: 10 INJECTION, SOLUTION EPIDURAL; INFILTRATION; INTRACAUDAL; PERINEURAL at 13:23

## 2023-04-14 RX ADMIN — PROPOFOL 180 MG: 10 INJECTION, EMULSION INTRAVENOUS at 13:23

## 2023-04-14 RX ADMIN — PHENYLEPHRINE HYDROCHLORIDE 0.3 MCG/KG/MIN: 10 INJECTION INTRAVENOUS at 13:43

## 2023-04-14 RX ADMIN — ALBUMIN HUMAN: 0.05 INJECTION, SOLUTION INTRAVENOUS at 14:14

## 2023-04-14 RX ADMIN — SUGAMMADEX 200 MG: 100 INJECTION, SOLUTION INTRAVENOUS at 14:45

## 2023-04-14 RX ADMIN — FENTANYL CITRATE 50 MCG: 50 INJECTION, SOLUTION INTRAMUSCULAR; INTRAVENOUS at 14:27

## 2023-04-14 RX ADMIN — FENTANYL CITRATE 100 MCG: 50 INJECTION, SOLUTION INTRAMUSCULAR; INTRAVENOUS at 12:40

## 2023-04-14 RX ADMIN — ROCURONIUM BROMIDE 50 MG: 10 INJECTION, SOLUTION INTRAVENOUS at 13:24

## 2023-04-14 RX ADMIN — GLYCOPYRROLATE 0.2 MG: 0.2 INJECTION INTRAMUSCULAR; INTRAVENOUS at 14:15

## 2023-04-14 RX ADMIN — ROPIVACAINE HYDROCHLORIDE 30 ML: 5 INJECTION EPIDURAL; INFILTRATION; PERINEURAL at 12:40

## 2023-04-14 RX ADMIN — DEXAMETHASONE SODIUM PHOSPHATE 4 MG: 4 INJECTION, SOLUTION INTRAMUSCULAR; INTRAVENOUS at 13:37

## 2023-04-14 RX ADMIN — Medication 100 MCG: at 13:47

## 2023-04-14 RX ADMIN — Medication 200 MCG: at 13:38

## 2023-04-14 RX ADMIN — Medication 3 G: at 13:29

## 2023-04-14 RX ADMIN — VASOPRESSIN 2 UNITS: 20 INJECTION, SOLUTION INTRAMUSCULAR; SUBCUTANEOUS at 14:06

## 2023-04-14 RX ADMIN — PROPOFOL 20 MG: 10 INJECTION, EMULSION INTRAVENOUS at 14:29

## 2023-04-14 RX ADMIN — SODIUM CHLORIDE, SODIUM LACTATE, POTASSIUM CHLORIDE, AND CALCIUM CHLORIDE: .6; .31; .03; .02 INJECTION, SOLUTION INTRAVENOUS at 13:12

## 2023-04-14 RX ADMIN — EPHEDRINE SULFATE 10 MG: 5 INJECTION INTRAVENOUS at 14:20

## 2023-04-14 RX ADMIN — Medication 150 MCG: at 13:50

## 2023-04-14 NOTE — OP NOTE
TENDON DISTAL BICEPS REPAIR/RELEASE  Procedure Report    Patient Name:  Choco Bryant  YOB: 1965    Date of Surgery:  4/14/2023     Indications: This is a 58 y.o. male with an injury to the left elbow.  Workup demonstrated distal bicep rupture.Treatment options were discussed.  They desired to proceed with distal bicep repair after discussing the risks including bleeding, scarring,infection, stiffness, nerve damage, tendon damage, artery damage, continued pain, DVT, fracture, loss of life or limb, and a need for further surgery including hardware removal.      Pre-op Diagnosis:   Rupture of right distal biceps tendon, initial encounter [S46.211A]    Postop Diagnosis       Post-Op Diagnosis Codes:     * Rupture of right distal biceps tendon, initial encounter [S46.211A]    Procedure/CPT® Codes:  71228    Procedure    Procedure(s): Left  TENDON DISTAL BICEPS REPAIR    Assistant: Alex Ridley physician assistant    was responsible for performing the following activities: Retraction, Suction, Irrigation, Suturing, Closing and Placing Dressing and their skilled assistance was necessary for the success of this case.         Anesthesia: General with Block    IVF: See anesthesia record    Estimated Blood Loss: minimal    Implants:    Implant Name Type Inv. Item Serial No.  Lot No. LRB No. Used Action   KT BUTTON REPR SHLDR DIST PEEK [id number] - [id number] Implant KT BUTTON REPR SHLDR DIST PEEK 7X10MM  [employer] [id number] Right 1 Implanted         Complications: None    Specimens:none    Tourniquet: none    Description of Procedure: The patient's operative site was marked.  Regional  anesthesia was administered.  Patient was brought to the operating room and placed on the operating room table.  General anesthesia was administered. Antibiotics were dosed.  A timeout was taken to confirm the correct operative site and procedure.    The arm was then prepped and draped in a standard surgical  fashion.    Incision was marked under fluoroscopic guidance.  Transverse incision opened, skin and flaps raised.  Lateral antebrachial cutaneous nerve identified and protected.    The bicep was reduced several adhesions distal to the antecubital fossa released with a combination of blunt and bipolar dissection.  It was prepared into a 7 mm tendon and whipstitched.  Tuberosity was identified and cleaned of soft tissue and a bicortical pin placed.  Unicortical socket created and the button was attached to the tendon whipstitch sutures.  It was passed through the socket and flipped and verified under fluoroscopy.  Elbow was flexed and sutures were used to pull the tendon into the socket.  One limb was passed through the tendon and then tied and then passed up through the  and the screw was placed on the radial aspect of the tuberosity.  The sutures were again tied.  Wound was irrigated and closed with suture and glue and a sterile dressing applied.  Long-arm splint placed.  There was good capillary refill.  All counts were correct.  No complications.  Taken to recovery room in satisfactory condition        Yimi Mosqueda MD     Date: 4/14/2023  Time: 15:48 EDT

## 2023-04-14 NOTE — ANESTHESIA PROCEDURE NOTES
Airway  Urgency: elective    Date/Time: 4/14/2023 1:25 PM  End Time:4/14/2023 1:26 PM  Airway not difficult    General Information and Staff    Patient location during procedure: OR  Anesthesiologist: Micki Figueroa MD  CRNA/CAA: Jacklyn Young CAA    Indications and Patient Condition  Indications for airway management: airway protection    Preoxygenated: yes  Mask difficulty assessment: 3 - difficult mask (inadequate, unstable or two providers) +/- NMBA    Final Airway Details  Final airway type: endotracheal airway      Successful airway: ETT  Cuffed: yes   Successful intubation technique: video laryngoscopy  Facilitating devices/methods: intubating stylet  Endotracheal tube insertion site: oral  Blade: Marin  Blade size: 4  ETT size (mm): 7.5  Cormack-Lehane Classification: grade I - full view of glottis  Placement verified by: chest auscultation and capnometry   Measured from: teeth  ETT/EBT  to teeth (cm): 23  Number of attempts at approach: 1  Assessment: lips, teeth, and gum same as pre-op and atraumatic intubation    Additional Comments  Bottom L molar tooth chipped per pt prior to airway instrumentation

## 2023-04-14 NOTE — ANESTHESIA PROCEDURE NOTES
Peripheral Block      Patient reassessed immediately prior to procedure    Patient location during procedure: pre-op  Start time: 4/14/2023 12:40 PM  Stop time: 4/14/2023 12:46 PM  Reason for block: at surgeon's request and post-op pain management  Performed by  Anesthesiologist: Micki Fiugeroa MD  Preanesthetic Checklist  Completed: patient identified, IV checked, site marked, risks and benefits discussed, surgical consent, monitors and equipment checked, pre-op evaluation and timeout performed  Prep:  Pt Position: supine  Sterile barriers:alcohol skin prep, cap, gloves, mask and washed/disinfected hands  Prep: ChloraPrep  Patient monitoring: blood pressure monitoring, continuous pulse oximetry and EKG  Procedure    Sedation: yes  Performed under: local infiltration  Guidance:ultrasound guided    ULTRASOUND INTERPRETATION.  Using ultrasound guidance a gauge needle was placed in close proximity to the brachial plexus nerve, at which point, under ultrasound guidance anesthetic was injected in the area of the nerve and spread of the anesthesia was seen on ultrasound in close proximity thereto.  There were no abnormalities seen on ultrasound; a digital image was taken; and the patient tolerated the procedure with no complications. Images:still images obtained, printed/placed on chart    Laterality:right  Block Type:supraclavicular  Injection Technique:single-shot  Needle Type:echogenic  Needle Gauge:20 G  Resistance on Injection: none  Sedation medications used: midazolam (VERSED) injection - Intravenous   2 mg - 4/14/2023 12:40:00 PM  fentaNYL citrate (PF) (SUBLIMAZE) injection - Intravenous   100 mcg - 4/14/2023 12:40:00 PM  Medications Used: dexamethasone (DECADRON) injection - Injection   4 mg - 4/14/2023 12:40:00 PM  ropivacaine (NAROPIN) 0.5 % injection - Injection   30 mL - 4/14/2023 12:40:00 PM      Post Assessment  Injection Assessment: negative aspiration for heme, no paresthesia on injection and  incremental injection  Patient Tolerance:comfortable throughout block  Complications:no

## 2023-04-14 NOTE — ANESTHESIA PREPROCEDURE EVALUATION
Anesthesia Evaluation     Patient summary reviewed and Nursing notes reviewed   no history of anesthetic complications:  NPO Solid Status: > 8 hours  NPO Liquid Status: > 8 hours           Airway   Mallampati: II  TM distance: >3 FB  Neck ROM: full  No difficulty expected  Dental      Pulmonary - normal exam   (+) sleep apnea on CPAP,   (-) not a smoker  Cardiovascular - normal exam    ECG reviewed    (+) hypertension, dysrhythmias, hyperlipidemia,       Neuro/Psych  (+) syncope, psychiatric history Anxiety, Depression and ADHD,    (-) neuromuscular disease  GI/Hepatic/Renal/Endo    (+) morbid obesity, GERD well controlled,      Musculoskeletal     Abdominal     Bowel sounds: normal.   Substance History   (+) alcohol use,   (-) drug use     OB/GYN          Other        ROS/Med Hx Other: Hx of SVT s/p ablation                    Anesthesia Plan    ASA 3     general with block     intravenous induction     Anesthetic plan, risks, benefits, and alternatives have been provided, discussed and informed consent has been obtained with: patient.    Plan discussed with CRNA and CAA.

## 2023-04-17 NOTE — ANESTHESIA POSTPROCEDURE EVALUATION
Patient: Choco Bryant    Procedure Summary     Date: 04/14/23 Room / Location: [facility] OR 11 / [facility] MAIN OR    Anesthesia Start: 1312 Anesthesia Stop: 1503    Procedure: TENDON DISTAL BICEPS REPAIR (Right: Arm Upper) Diagnosis:       Rupture of right distal biceps tendon, initial encounter      (Rupture of right distal biceps tendon, initial encounter [S46.211A])    Surgeons: Yimi Mosqueda MD Provider: Micki Figueroa MD    Anesthesia Type: general with block ASA Status: 3          Anesthesia Type: general with block    Vitals  Vitals Value Taken Time   /83 04/14/23 1552   Temp 97.9 °F (36.6 °C) 04/14/23 1549   Pulse 105 04/14/23 1552   Resp 19 04/14/23 1549   SpO2 91 % 04/14/23 1552   Vitals shown include unvalidated device data.        Post Anesthesia Care and Evaluation    Patient location during evaluation: PACU  Patient participation: complete - patient participated  Level of consciousness: awake and alert  Pain management: satisfactory to patient    Airway patency: patent  Anesthetic complications: No anesthetic complications  PONV Status: none  Cardiovascular status: acceptable  Respiratory status: acceptable  Hydration status: acceptable

## 2023-04-20 ENCOUNTER — OFFICE VISIT (OUTPATIENT)
Dept: ORTHOPEDIC SURGERY | Facility: CLINIC | Age: 58
End: 2023-04-20
Payer: COMMERCIAL

## 2023-04-20 VITALS — HEIGHT: 70 IN | WEIGHT: 272 LBS | BODY MASS INDEX: 38.94 KG/M2 | HEART RATE: 70 BPM

## 2023-04-20 DIAGNOSIS — Z47.89 ORTHOPEDIC AFTERCARE: Primary | ICD-10-CM

## 2023-04-20 PROCEDURE — 99024 POSTOP FOLLOW-UP VISIT: CPT | Performed by: ORTHOPAEDIC SURGERY

## 2023-04-20 NOTE — PROGRESS NOTES
"     Patient ID: Choco Bryant is a 58 y.o. male.    4/14/23 right distal bicep tendon repair  Pain controlled  Objective:    Pulse 70   Ht 177.8 cm (70\")   Wt 123 kg (272 lb)   BMI 39.03 kg/m²     Physical Examination:    Dressing is dry no sign of infection has some tingling over the lateral forearm and thumb area radial nerve function capillary refill are intact to the hand    Imaging:      Assessment:  Doing well after distal bicep repair    Plan:  Okay to begin active assist elbow and forearm range of motion continue sling for 3 weeks until I see him remove dressing next week  "

## 2023-05-03 DIAGNOSIS — G47.00 INSOMNIA, UNSPECIFIED TYPE: ICD-10-CM

## 2023-05-05 RX ORDER — CLONAZEPAM 2 MG/1
2 TABLET ORAL NIGHTLY PRN
Qty: 30 TABLET | Refills: 0 | Status: SHIPPED | OUTPATIENT
Start: 2023-05-05

## 2023-05-11 ENCOUNTER — OFFICE VISIT (OUTPATIENT)
Dept: ORTHOPEDIC SURGERY | Facility: CLINIC | Age: 58
End: 2023-05-11
Payer: COMMERCIAL

## 2023-05-11 VITALS — HEIGHT: 70 IN | WEIGHT: 272 LBS | BODY MASS INDEX: 38.94 KG/M2 | OXYGEN SATURATION: 98 %

## 2023-05-11 DIAGNOSIS — Z47.89 ORTHOPEDIC AFTERCARE: Primary | ICD-10-CM

## 2023-05-11 PROCEDURE — 99024 POSTOP FOLLOW-UP VISIT: CPT | Performed by: ORTHOPAEDIC SURGERY

## 2023-05-11 NOTE — PROGRESS NOTES
"     Patient ID: Choco Bryant is a 58 y.o. male.    4/14/23 right distal bicep tendon repair  Pain controlled    Objective:    Ht 177.8 cm (70\")   Wt 123 kg (272 lb)   SpO2 98%   BMI 39.03 kg/m²     Physical Examination:  Incision is healed continues with some numbness over the lateral forearm otherwise full elbow range of motion full finger range of motion       Imaging:      Assessment:  Doing well after distal bicep repair    Plan:  Restrictions discussed see me in 4 to 5 weeks  "

## 2023-05-30 RX ORDER — AMLODIPINE BESYLATE 10 MG/1
10 TABLET ORAL DAILY
Qty: 90 TABLET | Refills: 0 | Status: SHIPPED | OUTPATIENT
Start: 2023-05-30

## 2023-05-30 RX ORDER — OMEPRAZOLE 40 MG/1
40 CAPSULE, DELAYED RELEASE ORAL DAILY
Qty: 90 CAPSULE | Refills: 1 | Status: SHIPPED | OUTPATIENT
Start: 2023-05-30

## 2023-06-05 DIAGNOSIS — G47.00 INSOMNIA, UNSPECIFIED TYPE: ICD-10-CM

## 2023-06-05 RX ORDER — CLONAZEPAM 2 MG/1
2 TABLET ORAL NIGHTLY PRN
Qty: 30 TABLET | Refills: 0 | Status: SHIPPED | OUTPATIENT
Start: 2023-06-05

## 2023-06-19 ENCOUNTER — OFFICE VISIT (OUTPATIENT)
Dept: ORTHOPEDIC SURGERY | Facility: CLINIC | Age: 58
End: 2023-06-19
Payer: COMMERCIAL

## 2023-06-19 VITALS — BODY MASS INDEX: 38.94 KG/M2 | WEIGHT: 272 LBS | HEIGHT: 70 IN | HEART RATE: 77 BPM

## 2023-06-19 DIAGNOSIS — Z47.89 ORTHOPEDIC AFTERCARE: Primary | ICD-10-CM

## 2023-06-19 PROCEDURE — 99024 POSTOP FOLLOW-UP VISIT: CPT | Performed by: ORTHOPAEDIC SURGERY

## 2023-06-19 RX ORDER — BENZONATATE 200 MG/1
CAPSULE ORAL
COMMUNITY
Start: 2023-05-23

## 2023-06-19 NOTE — PROGRESS NOTES
Patient ID: Choco Bryant is a 58 y.o. male.    4/14/23 right distal bicep tendon repair  Pain controlled    Objective:    There were no vitals taken for this visit.    Physical Examination:    Right arm demonstrates healed incision he has improved sensation along the forearm elbow range of motion 0-1 35 with 80 degrees pronation supination no significant pain on resisted bicep testing    Imaging:      Assessment:  Doing well after distal bicep repair    Plan:  Okay to begin bicep strengthening hold off on starting power tools for another 2 to 3 weeks then activity as tolerated see me as needed

## 2023-08-21 DIAGNOSIS — G47.00 INSOMNIA, UNSPECIFIED TYPE: ICD-10-CM

## 2023-08-21 RX ORDER — CLONAZEPAM 2 MG/1
2 TABLET ORAL NIGHTLY PRN
Qty: 30 TABLET | Refills: 0 | Status: SHIPPED | OUTPATIENT
Start: 2023-08-21

## 2023-08-28 ENCOUNTER — TELEPHONE (OUTPATIENT)
Dept: FAMILY MEDICINE CLINIC | Facility: CLINIC | Age: 58
End: 2023-08-28
Payer: COMMERCIAL

## 2023-08-28 RX ORDER — ATORVASTATIN CALCIUM 20 MG/1
20 TABLET, FILM COATED ORAL DAILY
Qty: 90 TABLET | Refills: 1 | Status: SHIPPED | OUTPATIENT
Start: 2023-08-28

## 2023-08-28 RX ORDER — TAMSULOSIN HYDROCHLORIDE 0.4 MG/1
1 CAPSULE ORAL DAILY
Qty: 90 CAPSULE | Refills: 1 | Status: SHIPPED | OUTPATIENT
Start: 2023-08-28

## 2023-08-28 NOTE — TELEPHONE ENCOUNTER
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 OTHERWISE WILL NOT AUTHORIZE

## 2023-09-05 DIAGNOSIS — M54.50 CHRONIC BILATERAL LOW BACK PAIN WITHOUT SCIATICA: Primary | ICD-10-CM

## 2023-09-05 DIAGNOSIS — M48.02 SPINAL STENOSIS OF CERVICAL REGION: ICD-10-CM

## 2023-09-05 DIAGNOSIS — G89.29 CHRONIC BILATERAL LOW BACK PAIN WITHOUT SCIATICA: Primary | ICD-10-CM

## 2023-09-18 ENCOUNTER — TELEPHONE (OUTPATIENT)
Dept: FAMILY MEDICINE CLINIC | Facility: CLINIC | Age: 58
End: 2023-09-18
Payer: COMMERCIAL
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
## 2023-09-18 DIAGNOSIS — M54.50 CHRONIC BILATERAL LOW BACK PAIN WITHOUT SCIATICA: ICD-10-CM

## 2023-09-18 DIAGNOSIS — G89.29 CHRONIC BILATERAL LOW BACK PAIN WITHOUT SCIATICA: ICD-10-CM

## 2023-09-18 DIAGNOSIS — Z94.7 H/O CORNEA TRANSPLANT: Primary | ICD-10-CM
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
## 2023-09-18 NOTE — TELEPHONE ENCOUNTER
Caller: Choco Bryant    Relationship: Self    Best call back number: [phone]    What is the medical concern/diagnosis: YEARLY FOLLOW UP AFTER [employer] TRANSPLANT     What specialty or service is being requested: OPHTHALMOLOGY CLINIC     What is the provider, practice or medical service name: [facility]     What is the office location:     [address] [place] [address]    What is the office phone number: [phone]    FAX: [phone]    Any additional details: PATIENT HAS A YEARLY APPOINTMENT WITH THIS DOCTOR TO CHECK ON HIS EYES FOLLOWING A [employer] TRANSPLANT     PATIENT HAS SEEN THIS DOCTOR BEFORE BUT INSURANCE IS REQUIRING A NEW REFERRAL

## 2023-09-19 ENCOUNTER — LAB (OUTPATIENT)
Dept: FAMILY MEDICINE CLINIC | Facility: CLINIC | Age: 58
End: 2023-09-19
Payer: COMMERCIAL

## 2023-09-19 ENCOUNTER — OFFICE VISIT (OUTPATIENT)
Dept: FAMILY MEDICINE CLINIC | Facility: CLINIC | Age: 58
End: 2023-09-19
Payer: COMMERCIAL

## 2023-09-19 VITALS
DIASTOLIC BLOOD PRESSURE: 82 MMHG | OXYGEN SATURATION: 98 % | BODY MASS INDEX: 39.65 KG/M2 | TEMPERATURE: 97.7 F | RESPIRATION RATE: 16 BRPM | WEIGHT: 277 LBS | HEIGHT: 70 IN | HEART RATE: 74 BPM | SYSTOLIC BLOOD PRESSURE: 127 MMHG

## 2023-09-19 DIAGNOSIS — Z11.59 NEED FOR HEPATITIS C SCREENING TEST: ICD-10-CM

## 2023-09-19 DIAGNOSIS — Z00.00 PREVENTATIVE HEALTH CARE: ICD-10-CM

## 2023-09-19 DIAGNOSIS — Z12.5 PROSTATE CANCER SCREENING: ICD-10-CM

## 2023-09-19 DIAGNOSIS — Z00.00 PREVENTATIVE HEALTH CARE: Primary | ICD-10-CM

## 2023-09-19 PROCEDURE — 85025 COMPLETE CBC W/AUTO DIFF WBC: CPT | Performed by: NURSE PRACTITIONER

## 2023-09-19 PROCEDURE — 99396 PREV VISIT EST AGE 40-64: CPT | Performed by: NURSE PRACTITIONER

## 2023-09-19 PROCEDURE — 83036 HEMOGLOBIN GLYCOSYLATED A1C: CPT | Performed by: NURSE PRACTITIONER

## 2023-09-19 PROCEDURE — 84443 ASSAY THYROID STIM HORMONE: CPT | Performed by: NURSE PRACTITIONER

## 2023-09-19 PROCEDURE — 80053 COMPREHEN METABOLIC PANEL: CPT | Performed by: NURSE PRACTITIONER

## 2023-09-19 PROCEDURE — 82306 VITAMIN D 25 HYDROXY: CPT | Performed by: NURSE PRACTITIONER

## 2023-09-19 PROCEDURE — 80061 LIPID PANEL: CPT | Performed by: NURSE PRACTITIONER

## 2023-09-19 PROCEDURE — 86803 HEPATITIS C AB TEST: CPT | Performed by: NURSE PRACTITIONER

## 2023-09-19 PROCEDURE — G0103 PSA SCREENING: HCPCS | Performed by: NURSE PRACTITIONER

## 2023-09-19 PROCEDURE — 36415 COLL VENOUS BLD VENIPUNCTURE: CPT

## 2023-09-19 RX ORDER — TADALAFIL 5 MG/1
1 TABLET ORAL DAILY
COMMUNITY
Start: 2023-09-13
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
## 2023-09-20 ENCOUNTER — TELEPHONE (OUTPATIENT)
Dept: FAMILY MEDICINE CLINIC | Facility: CLINIC | Age: 58
End: 2023-09-20
Payer: COMMERCIAL

## 2023-09-20 DIAGNOSIS — G47.00 INSOMNIA, UNSPECIFIED TYPE: ICD-10-CM

## 2023-09-20 DIAGNOSIS — E66.01 CLASS 2 SEVERE OBESITY WITH SERIOUS COMORBIDITY AND BODY MASS INDEX (BMI) OF 36.0 TO 36.9 IN ADULT, UNSPECIFIED OBESITY TYPE: Primary | ICD-10-CM

## 2023-09-20 LAB
25(OH)D3 SERPL-MCNC: 51.3 NG/ML (ref 30–100)
ALBUMIN SERPL-MCNC: 4.5 G/DL (ref 3.5–5.2)
ALBUMIN/GLOB SERPL: 2 G/DL
ALP SERPL-CCNC: 111 U/L (ref 39–117)
ALT SERPL W P-5'-P-CCNC: 30 U/L (ref 1–41)
ANION GAP SERPL CALCULATED.3IONS-SCNC: 11.1 MMOL/L (ref 5–15)
AST SERPL-CCNC: 22 U/L (ref 1–40)
BASOPHILS # BLD AUTO: 0.04 10*3/MM3 (ref 0–0.2)
BASOPHILS NFR BLD AUTO: 0.7 % (ref 0–1.5)
BILIRUB SERPL-MCNC: 0.4 MG/DL (ref 0–1.2)
BUN SERPL-MCNC: 17 MG/DL (ref 6–20)
BUN/CREAT SERPL: 16.8 (ref 7–25)
CALCIUM SPEC-SCNC: 9.6 MG/DL (ref 8.6–10.5)
CHLORIDE SERPL-SCNC: 104 MMOL/L (ref 98–107)
CHOLEST SERPL-MCNC: 162 MG/DL (ref 0–200)
CO2 SERPL-SCNC: 23.9 MMOL/L (ref 22–29)
CREAT SERPL-MCNC: 1.01 MG/DL (ref 0.76–1.27)
DEPRECATED RDW RBC AUTO: 41.7 FL (ref 37–54)
EGFRCR SERPLBLD CKD-EPI 2021: 86.2 ML/MIN/1.73
EOSINOPHIL # BLD AUTO: 0.09 10*3/MM3 (ref 0–0.4)
EOSINOPHIL NFR BLD AUTO: 1.6 % (ref 0.3–6.2)
ERYTHROCYTE [DISTWIDTH] IN BLOOD BY AUTOMATED COUNT: 13.4 % (ref 12.3–15.4)
GLOBULIN UR ELPH-MCNC: 2.3 GM/DL
GLUCOSE SERPL-MCNC: 103 MG/DL (ref 65–99)
HBA1C MFR BLD: 6 % (ref 4.8–5.6)
HCT VFR BLD AUTO: 40.1 % (ref 37.5–51)
HCV AB SER DONR QL: NORMAL
HDLC SERPL-MCNC: 37 MG/DL (ref 40–60)
HGB BLD-MCNC: 13.3 G/DL (ref 13–17.7)
IMM GRANULOCYTES # BLD AUTO: 0.04 10*3/MM3 (ref 0–0.05)
IMM GRANULOCYTES NFR BLD AUTO: 0.7 % (ref 0–0.5)
LDLC SERPL CALC-MCNC: 93 MG/DL (ref 0–100)
LDLC/HDLC SERPL: 2.38 {RATIO}
LYMPHOCYTES # BLD AUTO: 1.57 10*3/MM3 (ref 0.7–3.1)
LYMPHOCYTES NFR BLD AUTO: 28.5 % (ref 19.6–45.3)
MCH RBC QN AUTO: 28.6 PG (ref 26.6–33)
MCHC RBC AUTO-ENTMCNC: 33.2 G/DL (ref 31.5–35.7)
MCV RBC AUTO: 86.2 FL (ref 79–97)
MONOCYTES # BLD AUTO: 0.49 10*3/MM3 (ref 0.1–0.9)
MONOCYTES NFR BLD AUTO: 8.9 % (ref 5–12)
NEUTROPHILS NFR BLD AUTO: 3.27 10*3/MM3 (ref 1.7–7)
NEUTROPHILS NFR BLD AUTO: 59.6 % (ref 42.7–76)
NRBC BLD AUTO-RTO: 0 /100 WBC (ref 0–0.2)
PLATELET # BLD AUTO: 266 10*3/MM3 (ref 140–450)
PMV BLD AUTO: 10.9 FL (ref 6–12)
POTASSIUM SERPL-SCNC: 3.9 MMOL/L (ref 3.5–5.2)
PROT SERPL-MCNC: 6.8 G/DL (ref 6–8.5)
PSA SERPL-MCNC: 1.46 NG/ML (ref 0–4)
RBC # BLD AUTO: 4.65 10*6/MM3 (ref 4.14–5.8)
SODIUM SERPL-SCNC: 139 MMOL/L (ref 136–145)
TRIGL SERPL-MCNC: 184 MG/DL (ref 0–150)
TSH SERPL DL<=0.05 MIU/L-ACNC: 1.92 UIU/ML (ref 0.27–4.2)
VLDLC SERPL-MCNC: 32 MG/DL (ref 5–40)
WBC NRBC COR # BLD: 5.5 10*3/MM3 (ref 3.4–10.8)

## 2023-09-20 NOTE — PROGRESS NOTES
Trigs were elevated slightly at 184 (should be below 150).   A1C went up some to 6.0 (prediabetes)  Work on diet and try to avoid sweets and fried/fatty foods  I am also calling in wegovy to start like we discussed.

## 2023-09-20 NOTE — TELEPHONE ENCOUNTER
HUB TO SHARE  ----- Message from PAVITHRA Pavon sent at 9/20/2023 12:27 PM EDT -----  Trigs were elevated slightly at 184 (should be below 150).   A1C went up some to 6.0 (prediabetes)  Work on diet and try to avoid sweets and fried/fatty foods  I am also calling in wegovy to start like we discussed.

## 2023-09-21 RX ORDER — CLONAZEPAM 2 MG/1
2 TABLET ORAL NIGHTLY PRN
Qty: 30 TABLET | Refills: 1 | Status: SHIPPED | OUTPATIENT
Start: 2023-09-21

## 2023-10-24 DIAGNOSIS — G47.00 INSOMNIA, UNSPECIFIED TYPE: ICD-10-CM

## 2023-10-25 RX ORDER — CLONAZEPAM 2 MG/1
2 TABLET ORAL NIGHTLY PRN
Qty: 30 TABLET | Refills: 1 | Status: SHIPPED | OUTPATIENT
Start: 2023-10-25

## 2023-11-25 RX ORDER — OMEPRAZOLE 40 MG/1
40 CAPSULE, DELAYED RELEASE ORAL DAILY
Qty: 90 CAPSULE | Refills: 1 | Status: SHIPPED | OUTPATIENT
Start: 2023-11-25

## 2023-11-27 DIAGNOSIS — G47.00 INSOMNIA, UNSPECIFIED TYPE: ICD-10-CM

## 2023-11-27 RX ORDER — CLONAZEPAM 2 MG/1
2 TABLET ORAL NIGHTLY PRN
Qty: 30 TABLET | Refills: 1 | Status: SHIPPED | OUTPATIENT
Start: 2023-11-27

## 2023-12-01 ENCOUNTER — APPOINTMENT (OUTPATIENT)
Dept: ULTRASOUND IMAGING | Facility: HOSPITAL | Age: 58
End: 2023-12-01
Payer: COMMERCIAL

## 2023-12-01 ENCOUNTER — HOSPITAL ENCOUNTER (EMERGENCY)
Facility: HOSPITAL | Age: 58
Discharge: HOME | End: 2023-12-01
Payer: COMMERCIAL

## 2023-12-01 VITALS
SYSTOLIC BLOOD PRESSURE: 128 MMHG | OXYGEN SATURATION: 98 % | TEMPERATURE: 99.2 F | BODY MASS INDEX: 40.49 KG/M2 | RESPIRATION RATE: 16 BRPM | DIASTOLIC BLOOD PRESSURE: 92 MMHG | WEIGHT: 282.85 LBS | HEIGHT: 70 IN | HEART RATE: 85 BPM

## 2023-12-01 DIAGNOSIS — L03.818 CELLULITIS OF OTHER SPECIFIED SITE: Primary | ICD-10-CM

## 2023-12-01 DIAGNOSIS — N50.82 SCROTAL PAIN: ICD-10-CM

## 2023-12-01 PROCEDURE — 99284 EMERGENCY DEPT VISIT MOD MDM: CPT

## 2023-12-01 PROCEDURE — 93976 VASCULAR STUDY: CPT

## 2023-12-01 PROCEDURE — 76870 US EXAM SCROTUM: CPT

## 2023-12-01 RX ORDER — CEPHALEXIN 500 MG/1
500 CAPSULE ORAL 3 TIMES DAILY
Qty: 21 CAPSULE | Refills: 0 | Status: SHIPPED | OUTPATIENT
Start: 2023-12-01 | End: 2023-12-08

## 2023-12-01 NOTE — DISCHARGE INSTRUCTIONS
Take antibiotics as directed and be sure to finish the entire course.  Alternate use of Tylenol and ibuprofen as needed for pain and discomfort.  Apply cool compress for 20 minutes at a time several times a day as needed for comfort.    Follow-up with urology for further evaluation as needed.  Follow-up with primary care provider as needed.    Return to the ER for new or worsening symptoms.

## 2023-12-01 NOTE — ED PROVIDER NOTES
"Subjective   History of Present Illness  Patient is a 58-year-old obese  male with a history of GERD, hypertension and MRSA who presents the emergency room with complaints of pain to his left scrotum, where patient states he has \"a knot.\"  He noticed it on Wednesday but states that it has not improved since then.  If nothing is touching it the mass does not hurt but feels like bad chafing otherwise.  He has not noticed any scrotal or testicular swelling.  He has had no injury or trauma.  He does have an appointment with his primary care provider in 3 days but did not believe it could wait that long given the progression has already had an 2-day period.  Patient reports allergies to sulfa.  He denies drug, alcohol tobacco use.    PCP: Fili      Review of Systems   Constitutional:  Negative for appetite change and fever.   HENT:  Negative for congestion and rhinorrhea.    Respiratory:  Negative for shortness of breath.    Cardiovascular:  Negative for chest pain.   Gastrointestinal:  Negative for abdominal pain.   Genitourinary:  Positive for testicular pain.   Neurological:  Negative for headaches.   Psychiatric/Behavioral:  Negative for confusion.    All other systems reviewed and are negative.      Past Medical History:   Diagnosis Date    ADHD (attention deficit hyperactivity disorder)     Anxiety     AV mark tachycardia 05/2017    Reentrant ablation. Abstracted from [facility].    Cataract 2020    Cornea transplant    Colon polyp 2923    Only one small begnine    Depression     Erectile dysfunction 2017    GERD (gastroesophageal reflux disease)     History of atrial tachycardia     History of supraventricular tachycardia     av node reentrant tachycardia s/p ablation    Hyperlipidemia     Hypertension     MRSA (methicillin resistant staph aureus) culture positive     eye    Sleep apnea     cpap       Allergies   Allergen Reactions    Sulfa Antibiotics Nausea Only       Past Surgical History: "   Procedure Laterality Date    CARDIAC ABLATION  05/2017    SVT ablation    CARDIAC CATHETERIZATION  2011    COLONOSCOPY      CORNEAL TRANSPLANT Left     DISTAL BICEPS TENDON REPAIR Right 04/14/2023    Procedure: TENDON DISTAL BICEPS REPAIR;  Surgeon: Yimi Mosqueda MD;  Location: [facility] MAIN OR;  Service: Orthopedics;  Laterality: Right;    EYE SURGERY Left     SINUS SURGERY  2016    UMBILICAL HERNIA REPAIR N/A 11/18/2020    Procedure: UMBILICAL HERNIA REPAIR with mesh;  Surgeon: Fredi Kaye MD;  Location: [facility] MAIN OR;  Service: General;  Laterality: N/A;       Family History   Problem Relation Age of Onset    Diabetes Mother     Glaucoma Mother     Brain cancer Mother     Heart disease Mother     Coronary artery disease Mother     Hypertension Father     Depression Father     Hyperlipidemia Father     Alzheimer's disease Father     Other Brother         MVA     Narcolepsy Son     Glaucoma Half-Sister     No Known Problems Half-Sister     Congenital heart disease Half-Brother        Social History     Socioeconomic History    Marital status:     Number of children: 2   Tobacco Use    Smoking status: Never    Smokeless tobacco: Never   Vaping Use    Vaping Use: Never used   Substance and Sexual Activity    Alcohol use: Yes     Alcohol/week: 1.0 standard drink of alcohol     Types: 1 Glasses of wine per week     Comment: Wine vwrt occasionaly    Drug use: No    Sexual activity: Not Currently     Partners: Female     Birth control/protection: None           Objective   Physical Exam  Vitals and nursing note reviewed. Exam conducted with a chaperone present (CARI Larose).   Constitutional:       General: He is not in acute distress.     Appearance: Normal appearance. He is obese. He is not ill-appearing.   Cardiovascular:      Rate and Rhythm: Normal rate and regular rhythm.      Heart sounds: Normal heart sounds. No murmur heard.  Pulmonary:      Effort: Pulmonary effort is normal. No  "respiratory distress.      Breath sounds: Normal breath sounds.   Abdominal:      General: Bowel sounds are normal.      Tenderness: There is no abdominal tenderness.   Genitourinary:     Penis: Normal.       Testes:         Right: Testicular hydrocele not present.         Left: Tenderness and swelling present. Testicular hydrocele not present.       Skin:     General: Skin is warm and dry.      Findings: Lesion present.   Neurological:      Mental Status: He is alert.         Procedures           ED Course      /92 (BP Location: Right arm, Patient Position: Sitting)   Pulse 85   Temp 99.2 °F (37.3 °C) (Oral)   Resp 16   Ht 177.8 cm (70\")   Wt 128 kg (282 lb 13.6 oz)   SpO2 98%   BMI 40.58 kg/m²   Labs Reviewed - No data to display  Medications - No data to display  US Scrotum & Testicles    Result Date: 12/1/2023  1. Irregular 3.2 x 2.5 x 1.8 similar hyper echoic lesion is seen in the area of palpable complaint, left groin and inferior to the testicle. It is nonspecific. Herniated fat, fat density mass, or hemorrhage could have a similar sonographic appearance. Electronically Signed: Eugenie Gillette MD  12/1/2023 11:59 AM EST  Workstation ID: [id number]                                          Medical Decision Making  Problems Addressed:  Cellulitis of other specified site: complicated acute illness or injury  Scrotal pain: complicated acute illness or injury    Risk  Prescription drug management.    Patient is a pleasant 58-year-old obese  male with a history of GERD, MRSA and hypertension who presents the emergency room with complaints of pain to the left side of his scrotum that started several days ago but has been worsening.  On exam, there is an erythematous tender region about 3 cm in diameter on the left lateral aspect of patient's scrotum without evidence of abscess or trauma.  Testes and scrotum are otherwise unremarkable.  Normal S1/S2 without clicks murmurs.  No JVD.  Lungs clear to " auscultation in all fields.  Initial differentials include cellulitis, abscess, mass.  This is not a complete list.    IV was established labs were obtained.  Patient received above examination.  Labs were considered but not completed at this time because I did not believe they would assist and diagnoses.  Patient received Doppler study and, upon my assessment, does not appear to have concern for abscess.  This is concurrent with radiologist, who notes an irregular 3.2 x 2.5 x 1.8 hyperechoic lesion that is nonspecific.  Differentials include herniated fat, fat density mass or hemorrhage could have similar appearance.  Based on my exam, I am concerned for cellulitis and discussed this with the patient.  At this time, he is agreeable to discharge with antibiotic treatment on outpatient basis.  He will follow-up to his primary care provider as scheduled on Monday and received information on urologist as well.  Patient has remained hemodynamically stable and is in no acute distress.  He is able to ambulate upright steadily without assistance upon discharge.    I discussed the findings with patient who voices understanding of discharge instructions, signs and symptoms requiring return to the ED; discharged improved and stable condition with follow-up for reevaluation.    Patient is aware that discharge does not mean that nothing is wrong but it indicates no emergency is present and they must continue care with follow-up as given below or physician of their choice.    This document is intended for medical expert use only.  Reading of this document by patients and/or patient's family without participating medical staff guidance may result in misinterpretation and unintended morbidity.  Any interpretation of such data is the responsibility of the patient and/or family member responsible for the patient in concert with their primary or specialist providers, not to be left for sources of online search as such as [employer],  iMedicare or similar queries.  Relying on these approaches to knowledge may result in misinterpretation, misguided goals of care and even death should patient or family members try recommendations outside of the realm of professional medical care in a supervised inpatient environment.    This medical document was created using Dragon dictation system. Some errors in speech recognition may occur.    Final diagnoses:   Cellulitis of other specified site   Scrotal pain       ED Disposition  ED Disposition       ED Disposition   Discharge    Condition   Stable    Comment   --               Jacinta Penn, APRN  [address]  [address]  [place] [id number] [phone]          [facility]  [address] [facility] [address] [id number] [phone]             Medication List        New Prescriptions      cephalexin 500 MG capsule  Commonly known as: KEFLEX  Take 1 capsule by mouth 3 (Three) Times a Day for 7 days.               Where to Get Your Medications        These medications were sent to [facility][id number] - [facility] [place] - [address] AT [facility] - [phone]  - [phone] FX  [address] [place] [address]      Phone: [phone]   cephalexin 500 MG capsule            Ivonne Gilliam APRN  12/01/23 1257

## 2023-12-04 ENCOUNTER — OFFICE VISIT (OUTPATIENT)
Dept: FAMILY MEDICINE CLINIC | Facility: CLINIC | Age: 58
End: 2023-12-04
Payer: COMMERCIAL

## 2023-12-04 VITALS
HEIGHT: 70 IN | WEIGHT: 286.2 LBS | RESPIRATION RATE: 18 BRPM | HEART RATE: 88 BPM | SYSTOLIC BLOOD PRESSURE: 124 MMHG | OXYGEN SATURATION: 97 % | DIASTOLIC BLOOD PRESSURE: 82 MMHG | BODY MASS INDEX: 40.97 KG/M2

## 2023-12-04 DIAGNOSIS — N49.2 ABSCESS, SCROTUM: Primary | ICD-10-CM

## 2023-12-04 PROCEDURE — 99213 OFFICE O/P EST LOW 20 MIN: CPT | Performed by: NURSE PRACTITIONER

## 2023-12-04 RX ORDER — DOXYCYCLINE HYCLATE 100 MG/1
100 CAPSULE ORAL 2 TIMES DAILY
Qty: 20 CAPSULE | Refills: 0 | Status: SHIPPED | OUTPATIENT
Start: 2023-12-04

## 2023-12-04 NOTE — PROGRESS NOTES
"Chief Complaint  Testicle Pain (Scrotum lump, went to [facility] ER on Friday 12/1/23 and was given keflex.  Patient says he's having improvement but now the lump is more like a boil that's draining.)  Subjective        Choco Bryant presents to [facility]  History of Present Illness  Pt comes in today for abscess and pain to left testicle. Noticed it last week as hard mass. Went to Er over the weekend and they were concerned for cellulitis. He was started on keflex and discharged home.   Since then the area has opened up slightly and has been draining thick serosang drainage. No fever or chills.  Tender to touch.   Testicle Pain  The patient's primary symptoms include testicular pain.     Review of Systems   Genitourinary:  Positive for testicular pain.     Objective     Vital Signs:   /82   Pulse 88   Resp 18   Ht 177.8 cm (70\")   Wt 130 kg (286 lb 3.2 oz)   SpO2 97%   BMI 41.07 kg/m²       BP Readings from Last 3 Encounters:   12/04/23 124/82   12/01/23 128/92   09/19/23 127/82       Wt Readings from Last 3 Encounters:   12/04/23 130 kg (286 lb 3.2 oz)   12/01/23 128 kg (282 lb 13.6 oz)   09/19/23 126 kg (277 lb)     Physical Exam  Constitutional:       Appearance: He is well-developed.   Eyes:      Pupils: Pupils are equal, round, and reactive to light.   Cardiovascular:      Rate and Rhythm: Normal rate and regular rhythm.   Genitourinary:      Neurological:      Mental Status: He is alert and oriented to person, place, and time.        Result Review :                 Assessment and Plan    Diagnoses and all orders for this visit:    1. Abscess, scrotum (Primary)  -     Cancel: Ambulatory Referral to General Surgery  -     doxycycline (VIBRAMYCIN) 100 MG capsule; Take 1 capsule by mouth 2 (Two) Times a Day.  Dispense: 20 capsule; Refill: 0  -     Ambulatory Referral to Urology    Add doxy  Warm compresses  Referral to urology for poss I&D.   During this office visit, we " discussed the pertinent aspects of the visit and treatment recommendations. Pt verbalizes understanding. Follow up was discussed. Patient was given the opportunity to ask questions and discuss other concerns.         Follow Up   Return if symptoms worsen or fail to improve.  Patient was given instructions and counseling regarding his condition or for health maintenance advice. Please see specific information pulled into the AVS if appropriate.

## 2024-01-01 DIAGNOSIS — G47.00 INSOMNIA, UNSPECIFIED TYPE: ICD-10-CM

## 2024-01-02 RX ORDER — CLONAZEPAM 2 MG/1
2 TABLET ORAL NIGHTLY PRN
Qty: 30 TABLET | Refills: 1 | Status: SHIPPED | OUTPATIENT
Start: 2024-01-02

## 2024-02-05 DIAGNOSIS — G47.00 INSOMNIA, UNSPECIFIED TYPE: ICD-10-CM

## 2024-02-05 DIAGNOSIS — F33.41 RECURRENT MAJOR DEPRESSIVE DISORDER, IN PARTIAL REMISSION: Chronic | ICD-10-CM

## 2024-02-06 RX ORDER — CLONAZEPAM 2 MG/1
2 TABLET ORAL NIGHTLY PRN
Qty: 30 TABLET | Refills: 1 | Status: SHIPPED | OUTPATIENT
Start: 2024-02-06

## 2024-02-06 RX ORDER — ARIPIPRAZOLE 2 MG/1
2 TABLET ORAL DAILY
Qty: 90 TABLET | Refills: 1 | Status: SHIPPED | OUTPATIENT
Start: 2024-02-06

## 2024-02-22 RX ORDER — ATORVASTATIN CALCIUM 20 MG/1
20 TABLET, FILM COATED ORAL DAILY
Qty: 90 TABLET | Refills: 1 | Status: SHIPPED | OUTPATIENT
Start: 2024-02-22

## 2024-03-15 DIAGNOSIS — G47.00 INSOMNIA, UNSPECIFIED TYPE: ICD-10-CM

## 2024-03-18 ENCOUNTER — TELEPHONE (OUTPATIENT)
Dept: FAMILY MEDICINE CLINIC | Facility: CLINIC | Age: 59
End: 2024-03-18
Payer: COMMERCIAL

## 2024-03-18 NOTE — TELEPHONE ENCOUNTER
Caller: [facility]    Relationship: Provider    Best call back number: [phone]     What is the medical concern/diagnosis:     What specialty or service is being requested: CHIROPRACTOR    What is the provider, practice or medical service name: [facility]    What is the office location: [address] [place] [address]    What is the office phone number: [phone]    Any additional details: ALSO SEND COPY OF REFERRAL TO [facility].

## 2024-03-19 DIAGNOSIS — G89.29 CHRONIC BILATERAL LOW BACK PAIN WITHOUT SCIATICA: Primary | ICD-10-CM

## 2024-03-19 DIAGNOSIS — M54.50 CHRONIC BILATERAL LOW BACK PAIN WITHOUT SCIATICA: Primary | ICD-10-CM

## 2024-03-19 RX ORDER — CLONAZEPAM 2 MG/1
2 TABLET ORAL NIGHTLY PRN
Qty: 30 TABLET | Refills: 1 | Status: SHIPPED | OUTPATIENT
Start: 2024-03-19

## 2024-04-03 RX ORDER — TADALAFIL 5 MG/1
5 TABLET ORAL DAILY
Qty: 30 TABLET | Refills: 1 | Status: SHIPPED | OUTPATIENT
Start: 2024-04-03

## 2024-04-08 ENCOUNTER — TELEPHONE (OUTPATIENT)
Dept: FAMILY MEDICINE CLINIC | Facility: CLINIC | Age: 59
End: 2024-04-08
Payer: COMMERCIAL

## 2024-04-08 DIAGNOSIS — G47.33 OSA (OBSTRUCTIVE SLEEP APNEA): Primary | ICD-10-CM

## 2024-04-08 NOTE — TELEPHONE ENCOUNTER
Patient's C-Pap is in the process of breaking and in order for him to get a new machine, he must have a new sleep study.  Please send a referral to Dr. Joseph Seipel.  It must come from his PCP.

## 2024-04-26 DIAGNOSIS — G47.00 INSOMNIA, UNSPECIFIED TYPE: ICD-10-CM

## 2024-04-26 DIAGNOSIS — F33.2 SEVERE EPISODE OF RECURRENT MAJOR DEPRESSIVE DISORDER, WITHOUT PSYCHOTIC FEATURES: ICD-10-CM

## 2024-04-29 RX ORDER — CLONAZEPAM 2 MG/1
2 TABLET ORAL NIGHTLY PRN
Qty: 30 TABLET | Refills: 1 | Status: SHIPPED | OUTPATIENT
Start: 2024-04-29

## 2024-04-29 RX ORDER — VORTIOXETINE 20 MG/1
20 TABLET, FILM COATED ORAL DAILY
Qty: 90 TABLET | Refills: 0 | Status: SHIPPED | OUTPATIENT
Start: 2024-04-29

## 2024-05-24 RX ORDER — OMEPRAZOLE 40 MG/1
40 CAPSULE, DELAYED RELEASE ORAL DAILY
Qty: 90 CAPSULE | Refills: 1 | Status: SHIPPED | OUTPATIENT
Start: 2024-05-24

## 2024-06-05 DIAGNOSIS — G47.00 INSOMNIA, UNSPECIFIED TYPE: ICD-10-CM

## 2024-06-06 RX ORDER — TAMSULOSIN HYDROCHLORIDE 0.4 MG/1
1 CAPSULE ORAL DAILY
Qty: 90 CAPSULE | Refills: 1 | Status: SHIPPED | OUTPATIENT
Start: 2024-06-06

## 2024-06-06 RX ORDER — CLONAZEPAM 2 MG/1
2 TABLET ORAL NIGHTLY PRN
Qty: 30 TABLET | Refills: 1 | Status: SHIPPED | OUTPATIENT
Start: 2024-06-06

## 2024-07-11 DIAGNOSIS — G47.00 INSOMNIA, UNSPECIFIED TYPE: ICD-10-CM

## 2024-07-12 RX ORDER — CLONAZEPAM 2 MG/1
2 TABLET ORAL NIGHTLY PRN
Qty: 30 TABLET | Refills: 1 | Status: SHIPPED | OUTPATIENT
Start: 2024-07-12

## 2024-07-15 NOTE — PROGRESS NOTES
Chief Complaint  Sleep Apnea    Subjective          Choco Bryant presents to [facility]  History of Present Illness    Former Sleep patient, his CPAP is breaking needs new one.   He wears nasal pillows, DME [place]    [facility]    Downloadfrom current machine Auto cpap 7-10., avg pressure 8.7  large leak 0 seconds, avg ahi 1.6    The patient c/o daytime sleepiness issue, no sleepiness with cpap     The patient complains of snoring loud in all sleeping positions with out cpap, no snoring with cpap     The patient complains of Leg symptoms:  no  rls symptoms .  However he was found to have periodic limb movements of sleep on the sleep study and has been taking clonazepam which has helped with the insomnia symptoms and is also indicated for periodic limb movement disorder.    Sleep schedule: Bedtime:9-10pm , gets out of bed at 4:30-7am, sleep latency: 20 mins, Gets about 7-8 hours hours of sleep.    EPWORTH SLEEPINESS SCALE  Sitting and reading JS [place] Sleepiness: 1 WatchingTV JS Winlock Sleepiness: 0  Sitting, inactive, in a public place JS [place] Sleepiness: 0 As a passenger in a car for 1 hour w/o a break  JS [place]lock Sleepiness: 1  Lying down to rest in the afternoon  JS [place]lock Sleepiness: 3   Sitting and talking to someone  JS Winlock Sleepiness: 0  Sitting quietly after a lunch  JS [place]lock Sleepiness: 0  In a car, while stopped for traffic or a light  JS Winlock Sleepiness: 0  Total 5    Review of Systems   HENT:  Positive for sinus pressure.    Eyes:  Positive for redness and itching.   Respiratory:  Positive for apnea.    Cardiovascular:  Positive for leg swelling.   Genitourinary:  Positive for difficulty urinating.   Musculoskeletal:  Positive for back pain and neck stiffness.   Allergic/Immunologic: Positive for environmental allergies.   All other systems reviewed and are negative.    There is no history of hypnagogic hallucinations, sleep paralysis  "or cataplexy.  There is no h/O sleepwalking or bedwetting or nightmares or sleep eating or acting out dreams    Objective   Vital Signs:   /78   Pulse 74   Ht 177.8 cm (70\")   Wt 125 kg (275 lb)   BMI 39.46 kg/m²     Physical Exam  Vitals reviewed.   Constitutional:       Appearance: He is obese.   Pulmonary:      Effort: Pulmonary effort is normal. No respiratory distress.   Neurological:      General: No focal deficit present.      Mental Status: He is alert and oriented to person, place, and time.   Psychiatric:         Mood and Affect: Mood normal.        Result Review :                 Assessment and Plan    Diagnoses and all orders for this visit:    1. LAQUITA (obstructive sleep apnea) (Primary)  -     PAP Therapy    2. Insomnia, unspecified type    Pt is benefiting and compliant with pap  Will order new auto CPAP 7-10    Continue clonazepam for plms / plmd and insomnia symptoms       Follow Up   Return for Follow Up visit 30 to 90 days after obtaining PAP.  Patient was given instructions and counseling regarding his condition or for health maintenance advice. Please see specific information pulled into the AVS if appropriate.       This document has been electronically signed by Joseph Seipel, MD on July 16, 2024 18:24 EDT  "

## 2024-07-16 ENCOUNTER — OFFICE VISIT (OUTPATIENT)
Dept: NEUROLOGY | Facility: CLINIC | Age: 59
End: 2024-07-16
Payer: COMMERCIAL

## 2024-07-16 VITALS
BODY MASS INDEX: 39.37 KG/M2 | WEIGHT: 275 LBS | SYSTOLIC BLOOD PRESSURE: 138 MMHG | DIASTOLIC BLOOD PRESSURE: 78 MMHG | HEART RATE: 74 BPM | HEIGHT: 70 IN

## 2024-07-16 DIAGNOSIS — G47.00 INSOMNIA, UNSPECIFIED TYPE: Chronic | ICD-10-CM

## 2024-07-16 DIAGNOSIS — G47.33 OSA (OBSTRUCTIVE SLEEP APNEA): Primary | Chronic | ICD-10-CM

## 2024-07-16 PROBLEM — K63.5 POLYP OF COLON: Status: ACTIVE | Noted: 2023-02-01

## 2024-07-16 PROBLEM — E78.00 PURE HYPERCHOLESTEROLEMIA: Status: ACTIVE | Noted: 2024-07-16

## 2024-07-17 ENCOUNTER — PATIENT ROUNDING (BHMG ONLY) (OUTPATIENT)
Dept: NEUROLOGY | Facility: CLINIC | Age: 59
End: 2024-07-17
Payer: COMMERCIAL

## 2024-08-07 ENCOUNTER — OFFICE VISIT (OUTPATIENT)
Dept: FAMILY MEDICINE CLINIC | Facility: CLINIC | Age: 59
End: 2024-08-07
Payer: COMMERCIAL

## 2024-08-07 VITALS
OXYGEN SATURATION: 97 % | BODY MASS INDEX: 40.09 KG/M2 | RESPIRATION RATE: 18 BRPM | WEIGHT: 280 LBS | HEART RATE: 77 BPM | HEIGHT: 70 IN | SYSTOLIC BLOOD PRESSURE: 126 MMHG | DIASTOLIC BLOOD PRESSURE: 84 MMHG

## 2024-08-07 DIAGNOSIS — E66.01 CLASS 3 SEVERE OBESITY WITH SERIOUS COMORBIDITY AND BODY MASS INDEX (BMI) OF 40.0 TO 44.9 IN ADULT, UNSPECIFIED OBESITY TYPE: ICD-10-CM

## 2024-08-07 DIAGNOSIS — L98.9 SKIN LESION OF FACE: Primary | ICD-10-CM

## 2024-08-07 PROCEDURE — 99213 OFFICE O/P EST LOW 20 MIN: CPT | Performed by: NURSE PRACTITIONER

## 2024-08-07 NOTE — PROGRESS NOTES
"Chief Complaint  Mass (On left cheek )  Subjective        Choco Bryant presents to [facility]  History of Present Illness  Pt comes in today with c/o spot on left cheek that has been present for over 1 year. However, it seems to be changing. Changing in size. Will itch and at times bleed.     Also concerned about weight. We were going to try wegovy in the past, but there was a shortage and having issues with insurance.        Objective     Vital Signs:   /84   Pulse 77   Resp 18   Ht 177.8 cm (70\")   Wt 127 kg (280 lb)   SpO2 97%   BMI 40.18 kg/m²       BP Readings from Last 3 Encounters:   08/07/24 126/84   07/16/24 138/78   12/04/23 124/82       Wt Readings from Last 3 Encounters:   08/07/24 127 kg (280 lb)   07/16/24 125 kg (275 lb)   12/04/23 130 kg (286 lb 3.2 oz)     Physical Exam  Constitutional:       Appearance: He is well-developed. He is obese.   Eyes:      Pupils: Pupils are equal, round, and reactive to light.   Cardiovascular:      Rate and Rhythm: Normal rate and regular rhythm.   Pulmonary:      Effort: Pulmonary effort is normal.      Breath sounds: Normal breath sounds.   Neurological:      Mental Status: He is alert and oriented to person, place, and time.        Result Review :                 Assessment and Plan    Diagnoses and all orders for this visit:    1. Skin lesion of face (Primary)  -     Ambulatory Referral to Dermatology    2. Class 3 severe obesity with serious comorbidity and body mass index (BMI) of 40.0 to 44.9 in adult, unspecified obesity type    Referral to derm  Pt is going to check on insurance coverage on wegovy and/or zepbound.  Discussed poss compounded semaglutide from [place]  Follow up in 6 weeks for physical.  During this office visit, we discussed the pertinent aspects of the visit and treatment recommendations. Pt verbalizes understanding. Follow up was discussed. Patient was given the opportunity to ask questions " and discuss other concerns.         Follow Up   Return in about 2 months (around 10/7/2024) for Annual physical.  Patient was given instructions and counseling regarding his condition or for health maintenance advice. Please see specific information pulled into the AVS if appropriate.       Answers submitted by the patient for this visit:  Primary Reason for Visit (Submitted on 8/6/2024)  What is the primary reason for your visit?: Other  Other (Submitted on 8/6/2024)  Please describe your symptoms.: Spot in cheek that antonina go away  Have you had these symptoms before?: Yes  How long have you been having these symptoms?: Greater than 2 weeks

## 2024-08-14 DIAGNOSIS — F33.2 SEVERE EPISODE OF RECURRENT MAJOR DEPRESSIVE DISORDER, WITHOUT PSYCHOTIC FEATURES: ICD-10-CM

## 2024-08-14 DIAGNOSIS — G47.00 INSOMNIA, UNSPECIFIED TYPE: ICD-10-CM

## 2024-08-14 RX ORDER — VORTIOXETINE 20 MG/1
20 TABLET, FILM COATED ORAL DAILY
Qty: 90 TABLET | Refills: 0 | Status: SHIPPED | OUTPATIENT
Start: 2024-08-14

## 2024-08-14 RX ORDER — CLONAZEPAM 2 MG/1
2 TABLET ORAL NIGHTLY PRN
Qty: 30 TABLET | Refills: 0 | Status: SHIPPED | OUTPATIENT
Start: 2024-08-14

## 2024-08-20 DIAGNOSIS — F33.41 RECURRENT MAJOR DEPRESSIVE DISORDER, IN PARTIAL REMISSION: Chronic | ICD-10-CM

## 2024-08-20 RX ORDER — ARIPIPRAZOLE 2 MG/1
2 TABLET ORAL DAILY
Qty: 90 TABLET | Refills: 1 | Status: SHIPPED | OUTPATIENT
Start: 2024-08-20

## 2024-08-20 RX ORDER — ATORVASTATIN CALCIUM 20 MG/1
20 TABLET, FILM COATED ORAL DAILY
Qty: 90 TABLET | Refills: 1 | Status: SHIPPED | OUTPATIENT
Start: 2024-08-20

## 2024-08-22 ENCOUNTER — TELEPHONE (OUTPATIENT)
Dept: NEUROLOGY | Facility: CLINIC | Age: 59
End: 2024-08-22
Payer: COMMERCIAL

## 2024-08-22 DIAGNOSIS — G47.33 OSA (OBSTRUCTIVE SLEEP APNEA): Primary | ICD-10-CM

## 2024-08-22 NOTE — TELEPHONE ENCOUNTER
----- Message from Joseph Seipel sent at 7/16/2024  2:56 PM EDT -----  Regarding: sleep  Pt has old remstar auto system one 60 series machine , he has registered but did not receive a machine    Send to [facility] order for auto cpap 7-10

## 2024-09-03 ENCOUNTER — OFFICE VISIT (OUTPATIENT)
Dept: FAMILY MEDICINE CLINIC | Facility: CLINIC | Age: 59
End: 2024-09-03
Payer: COMMERCIAL

## 2024-09-03 VITALS
WEIGHT: 276.2 LBS | HEART RATE: 83 BPM | BODY MASS INDEX: 39.54 KG/M2 | RESPIRATION RATE: 18 BRPM | OXYGEN SATURATION: 96 % | SYSTOLIC BLOOD PRESSURE: 138 MMHG | HEIGHT: 70 IN | DIASTOLIC BLOOD PRESSURE: 97 MMHG

## 2024-09-03 DIAGNOSIS — J40 BRONCHITIS: Primary | ICD-10-CM

## 2024-09-03 PROCEDURE — 99213 OFFICE O/P EST LOW 20 MIN: CPT | Performed by: NURSE PRACTITIONER

## 2024-09-03 RX ORDER — METHYLPREDNISOLONE 4 MG
TABLET, DOSE PACK ORAL
Qty: 21 TABLET | Refills: 0 | Status: SHIPPED | OUTPATIENT
Start: 2024-09-03

## 2024-09-03 RX ORDER — AZITHROMYCIN 250 MG/1
TABLET, FILM COATED ORAL
Qty: 6 TABLET | Refills: 0 | Status: SHIPPED | OUTPATIENT
Start: 2024-09-03

## 2024-09-03 NOTE — PROGRESS NOTES
"Chief Complaint  Cough (Has been going on for 3 weeks /Patient says he tested neg for covid this morning.)  Subjective        Choco Bryant presents to [facility]  History of Present Illness  Pt comes in today with c/o cough and congestion.  Started about 3-4 weeks ago.  Cough is productive at times.  Went to [facility] recently and was prescribed cough meds and prednisone. No anbx.   Has sinus pressure and head congestion when coughing   Not taking  anything otc.  Tested negative for covid at home.  No N/v/d  No fever or chills.  Cough is worse in the morning and night.  Has headaches.   Cough  This is a new problem. The current episode started 1 to 4 weeks ago. The problem has been worsening. The problem occurs every few minutes. The cough is Rattling. Associated symptoms include headaches, postnasal drip, a sore throat, shortness of breath and wheezing. Pertinent negatives include no chills, ear congestion, ear pain, fever, heartburn, hemoptysis, myalgias, nasal congestion, rash, rhinorrhea, sweats or weight loss. Nothing aggravates the symptoms.     Review of Systems   Constitutional:  Negative for chills, fever and unexpected weight loss.   HENT:  Positive for postnasal drip and sore throat. Negative for ear pain and rhinorrhea.    Respiratory:  Positive for cough, shortness of breath and wheezing. Negative for hemoptysis.    Musculoskeletal:  Negative for myalgias.   Skin:  Negative for rash.     Objective     Vital Signs:   /97   Pulse 83   Resp 18   Ht 177.8 cm (70\")   Wt 125 kg (276 lb 3.2 oz)   SpO2 96%   BMI 39.63 kg/m²       BP Readings from Last 3 Encounters:   09/03/24 138/97   08/07/24 126/84   07/16/24 138/78       Wt Readings from Last 3 Encounters:   09/03/24 125 kg (276 lb 3.2 oz)   08/07/24 127 kg (280 lb)   07/16/24 125 kg (275 lb)     Physical Exam  Constitutional:       Appearance: He is well-developed.   HENT:      Right Ear: Tympanic membrane normal.    "   Left Ear: Tympanic membrane normal.      Nose: No congestion.      Mouth/Throat:      Pharynx: No posterior oropharyngeal erythema.   Eyes:      Pupils: Pupils are equal, round, and reactive to light.   Cardiovascular:      Rate and Rhythm: Normal rate and regular rhythm.   Pulmonary:      Effort: Pulmonary effort is normal. No respiratory distress.      Breath sounds: Normal breath sounds. No wheezing.   Neurological:      Mental Status: He is alert and oriented to person, place, and time.        Result Review :                 Assessment and Plan    Diagnoses and all orders for this visit:    1. Bronchitis (Primary)  -     azithromycin (Zithromax Z-Fracisco) 250 MG tablet; Take 2 tablets by mouth on day 1, then 1 tablet daily on days 2-5  Dispense: 6 tablet; Refill: 0  -     methylPREDNISolone (MEDROL) 4 MG dose pack; Take as directed on package instructions.  Dispense: 21 tablet; Refill: 0    Start meds  Mucinex otc  Push fluids  During this office visit, we discussed the pertinent aspects of the visit and treatment recommendations. Pt verbalizes understanding. Follow up was discussed. Patient was given the opportunity to ask questions and discuss other concerns.         Follow Up   Return if symptoms worsen or fail to improve.  Patient was given instructions and counseling regarding his condition or for health maintenance advice. Please see specific information pulled into the AVS if appropriate.       Answers submitted by the patient for this visit:  Primary Reason for Visit (Submitted on 8/29/2024)  What is the primary reason for your visit?: Cough

## 2024-09-12 DIAGNOSIS — G47.00 INSOMNIA, UNSPECIFIED TYPE: ICD-10-CM

## 2024-09-16 RX ORDER — CLONAZEPAM 2 MG/1
2 TABLET ORAL NIGHTLY PRN
Qty: 30 TABLET | Refills: 0 | Status: SHIPPED | OUTPATIENT
Start: 2024-09-16

## 2024-10-15 DIAGNOSIS — G47.00 INSOMNIA, UNSPECIFIED TYPE: ICD-10-CM

## 2024-10-16 RX ORDER — CLONAZEPAM 2 MG/1
2 TABLET ORAL NIGHTLY PRN
Qty: 30 TABLET | Refills: 0 | Status: SHIPPED | OUTPATIENT
Start: 2024-10-16

## 2024-10-23 ENCOUNTER — LAB (OUTPATIENT)
Dept: FAMILY MEDICINE CLINIC | Facility: CLINIC | Age: 59
End: 2024-10-23
Payer: COMMERCIAL

## 2024-10-23 ENCOUNTER — OFFICE VISIT (OUTPATIENT)
Dept: FAMILY MEDICINE CLINIC | Facility: CLINIC | Age: 59
End: 2024-10-23
Payer: COMMERCIAL

## 2024-10-23 VITALS
BODY MASS INDEX: 40.54 KG/M2 | RESPIRATION RATE: 18 BRPM | HEIGHT: 70 IN | WEIGHT: 283.2 LBS | HEART RATE: 71 BPM | OXYGEN SATURATION: 98 % | SYSTOLIC BLOOD PRESSURE: 132 MMHG | DIASTOLIC BLOOD PRESSURE: 80 MMHG

## 2024-10-23 DIAGNOSIS — Z12.5 SCREENING FOR PROSTATE CANCER: ICD-10-CM

## 2024-10-23 DIAGNOSIS — Z00.00 PREVENTATIVE HEALTH CARE: Primary | ICD-10-CM

## 2024-10-23 DIAGNOSIS — M54.12 CERVICAL RADICULOPATHY: ICD-10-CM

## 2024-10-23 DIAGNOSIS — Z00.00 PREVENTATIVE HEALTH CARE: ICD-10-CM

## 2024-10-23 LAB
BASOPHILS # BLD AUTO: 0.07 10*3/MM3 (ref 0–0.2)
BASOPHILS NFR BLD AUTO: 1 % (ref 0–1.5)
DEPRECATED RDW RBC AUTO: 43.9 FL (ref 37–54)
EOSINOPHIL # BLD AUTO: 0.11 10*3/MM3 (ref 0–0.4)
EOSINOPHIL NFR BLD AUTO: 1.5 % (ref 0.3–6.2)
ERYTHROCYTE [DISTWIDTH] IN BLOOD BY AUTOMATED COUNT: 13.1 % (ref 12.3–15.4)
HCT VFR BLD AUTO: 44.4 % (ref 37.5–51)
HGB BLD-MCNC: 14.2 G/DL (ref 13–17.7)
IMM GRANULOCYTES # BLD AUTO: 0.12 10*3/MM3 (ref 0–0.05)
IMM GRANULOCYTES NFR BLD AUTO: 1.7 % (ref 0–0.5)
LYMPHOCYTES # BLD AUTO: 1.85 10*3/MM3 (ref 0.7–3.1)
LYMPHOCYTES NFR BLD AUTO: 25.8 % (ref 19.6–45.3)
MCH RBC QN AUTO: 29 PG (ref 26.6–33)
MCHC RBC AUTO-ENTMCNC: 32 G/DL (ref 31.5–35.7)
MCV RBC AUTO: 90.8 FL (ref 79–97)
MONOCYTES # BLD AUTO: 0.73 10*3/MM3 (ref 0.1–0.9)
MONOCYTES NFR BLD AUTO: 10.2 % (ref 5–12)
NEUTROPHILS NFR BLD AUTO: 4.28 10*3/MM3 (ref 1.7–7)
NEUTROPHILS NFR BLD AUTO: 59.8 % (ref 42.7–76)
NRBC BLD AUTO-RTO: 0 /100 WBC (ref 0–0.2)
PLATELET # BLD AUTO: 253 10*3/MM3 (ref 140–450)
PMV BLD AUTO: 10.6 FL (ref 6–12)
RBC # BLD AUTO: 4.89 10*6/MM3 (ref 4.14–5.8)
WBC NRBC COR # BLD AUTO: 7.16 10*3/MM3 (ref 3.4–10.8)

## 2024-10-23 PROCEDURE — 85025 COMPLETE CBC W/AUTO DIFF WBC: CPT | Performed by: NURSE PRACTITIONER

## 2024-10-23 PROCEDURE — 83036 HEMOGLOBIN GLYCOSYLATED A1C: CPT | Performed by: NURSE PRACTITIONER

## 2024-10-23 PROCEDURE — 99213 OFFICE O/P EST LOW 20 MIN: CPT | Performed by: NURSE PRACTITIONER

## 2024-10-23 PROCEDURE — 80061 LIPID PANEL: CPT | Performed by: NURSE PRACTITIONER

## 2024-10-23 PROCEDURE — 80053 COMPREHEN METABOLIC PANEL: CPT | Performed by: NURSE PRACTITIONER

## 2024-10-23 PROCEDURE — 36415 COLL VENOUS BLD VENIPUNCTURE: CPT

## 2024-10-23 PROCEDURE — 84443 ASSAY THYROID STIM HORMONE: CPT | Performed by: NURSE PRACTITIONER

## 2024-10-23 PROCEDURE — G0103 PSA SCREENING: HCPCS | Performed by: NURSE PRACTITIONER

## 2024-10-23 PROCEDURE — 99396 PREV VISIT EST AGE 40-64: CPT | Performed by: NURSE PRACTITIONER

## 2024-10-23 NOTE — PROGRESS NOTES
"Chief Complaint  Annual Exam  Subjective        Choco Bryant presents to [facility]  History of Present Illness  Pt comes in today for routine physical.  He is also with c/o neck pain and radiating down right arm.  Started about 4 weeks ago.   No known injuries.   No significant pain, but certain movements causes tingling down right arm.  Taking methocarbamol that he had at home.   States if he moves his head back the numbness goes down arm. He moves it back and the numbness resolves.         Objective     Vital Signs:   /80   Pulse 71   Resp 18   Ht 177.8 cm (70\")   Wt 128 kg (283 lb 3.2 oz)   SpO2 98%   BMI 40.63 kg/m²       BP Readings from Last 3 Encounters:   10/23/24 132/80   09/03/24 138/97   08/07/24 126/84       Wt Readings from Last 3 Encounters:   10/23/24 128 kg (283 lb 3.2 oz)   09/03/24 125 kg (276 lb 3.2 oz)   08/07/24 127 kg (280 lb)     Physical Exam  Constitutional:       Appearance: He is well-developed. He is obese.   HENT:      Head: Normocephalic.   Eyes:      Conjunctiva/sclera: Conjunctivae normal.      Pupils: Pupils are equal, round, and reactive to light.   Neck:      Thyroid: No thyromegaly.   Cardiovascular:      Rate and Rhythm: Normal rate and regular rhythm.      Heart sounds: No murmur heard.  Pulmonary:      Effort: Pulmonary effort is normal.      Breath sounds: Normal breath sounds.   Abdominal:      General: Bowel sounds are normal.      Palpations: Abdomen is soft.      Tenderness: There is no abdominal tenderness.   Musculoskeletal:         General: Normal range of motion.      Cervical back: Normal range of motion and neck supple.   Skin:     General: Skin is warm and dry.      Findings: No lesion.   Neurological:      Mental Status: He is alert and oriented to person, place, and time.   Psychiatric:         Behavior: Behavior normal.        Result Review :                 Assessment and Plan    Diagnoses and all orders for this " visit:    1. Preventative health care (Primary)  -     CBC & Differential; Future  -     Comprehensive Metabolic Panel; Future  -     Hemoglobin A1c; Future  -     Lipid Panel; Future  -     PSA Screen; Future  -     TSH; Future    2. Screening for prostate cancer  -     PSA Screen; Future    3. Cervical radiculopathy  -     Cancel: Ambulatory Referral to Physical Therapy for Evaluation & Treatment  -     Ambulatory Referral to Physical Therapy for Evaluation & Treatment    Check labs  Referral to PT   During this visit for their annual exam, we reviewed their personal history, social history and family history. We went over their medications and all the recommended health maintenance items for their age group. They were given the opportunity to ask questions and discuss other concerns.   Discussed importance of regular exercise and recommended starting or continuing a regular exercise program for good health. The patient was also encouraged to lose weight for better health.         Follow Up   Return in about 6 months (around 4/23/2025).  Patient was given instructions and counseling regarding his condition or for health maintenance advice. Please see specific information pulled into the AVS if appropriate.

## 2024-10-24 LAB
ALBUMIN SERPL-MCNC: 4 G/DL (ref 3.5–5.2)
ALBUMIN/GLOB SERPL: 1.5 G/DL
ALP SERPL-CCNC: 121 U/L (ref 39–117)
ALT SERPL W P-5'-P-CCNC: 23 U/L (ref 1–41)
ANION GAP SERPL CALCULATED.3IONS-SCNC: 9.7 MMOL/L (ref 5–15)
AST SERPL-CCNC: 15 U/L (ref 1–40)
BILIRUB SERPL-MCNC: 0.4 MG/DL (ref 0–1.2)
BUN SERPL-MCNC: 15 MG/DL (ref 6–20)
BUN/CREAT SERPL: 14.2 (ref 7–25)
CALCIUM SPEC-SCNC: 9.1 MG/DL (ref 8.6–10.5)
CHLORIDE SERPL-SCNC: 104 MMOL/L (ref 98–107)
CHOLEST SERPL-MCNC: 173 MG/DL (ref 0–200)
CO2 SERPL-SCNC: 27.3 MMOL/L (ref 22–29)
CREAT SERPL-MCNC: 1.06 MG/DL (ref 0.76–1.27)
EGFRCR SERPLBLD CKD-EPI 2021: 80.8 ML/MIN/1.73
GLOBULIN UR ELPH-MCNC: 2.7 GM/DL
GLUCOSE SERPL-MCNC: 74 MG/DL (ref 65–99)
HBA1C MFR BLD: 5.8 % (ref 4.8–5.6)
HDLC SERPL-MCNC: 40 MG/DL (ref 40–60)
LDLC SERPL CALC-MCNC: 96 MG/DL (ref 0–100)
LDLC/HDLC SERPL: 2.24 {RATIO}
POTASSIUM SERPL-SCNC: 4.2 MMOL/L (ref 3.5–5.2)
PROT SERPL-MCNC: 6.7 G/DL (ref 6–8.5)
PSA SERPL-MCNC: 1.43 NG/ML (ref 0–4)
SODIUM SERPL-SCNC: 141 MMOL/L (ref 136–145)
TRIGL SERPL-MCNC: 217 MG/DL (ref 0–150)
TSH SERPL DL<=0.05 MIU/L-ACNC: 2.44 UIU/ML (ref 0.27–4.2)
VLDLC SERPL-MCNC: 37 MG/DL (ref 5–40)

## 2024-10-24 NOTE — PROGRESS NOTES
Trigs were a little high at 217 (should be below 150). Needs to work on diet and avoid fried/fatty foods.   Otherwise labs looked ok.

## 2024-11-14 DIAGNOSIS — F33.2 SEVERE EPISODE OF RECURRENT MAJOR DEPRESSIVE DISORDER, WITHOUT PSYCHOTIC FEATURES: ICD-10-CM

## 2024-11-14 RX ORDER — VORTIOXETINE 20 MG/1
20 TABLET, FILM COATED ORAL DAILY
Qty: 90 TABLET | Refills: 0 | Status: SHIPPED | OUTPATIENT
Start: 2024-11-14

## 2024-11-14 RX ORDER — TADALAFIL 5 MG/1
5 TABLET ORAL DAILY
Qty: 90 TABLET | Refills: 1 | Status: SHIPPED | OUTPATIENT
Start: 2024-11-14

## 2024-11-14 RX ORDER — OMEPRAZOLE 40 MG/1
40 CAPSULE, DELAYED RELEASE ORAL DAILY
Qty: 90 CAPSULE | Refills: 1 | Status: SHIPPED | OUTPATIENT
Start: 2024-11-14

## 2024-11-14 RX ORDER — TAMSULOSIN HYDROCHLORIDE 0.4 MG/1
1 CAPSULE ORAL DAILY
Qty: 90 CAPSULE | Refills: 1 | Status: SHIPPED | OUTPATIENT
Start: 2024-11-14

## 2024-12-26 DIAGNOSIS — G47.00 INSOMNIA, UNSPECIFIED TYPE: ICD-10-CM

## 2024-12-30 RX ORDER — CLONAZEPAM 2 MG/1
2 TABLET ORAL NIGHTLY PRN
Qty: 30 TABLET | Refills: 0 | Status: SHIPPED | OUTPATIENT
Start: 2024-12-30

## 2025-02-02 DIAGNOSIS — G47.00 INSOMNIA, UNSPECIFIED TYPE: ICD-10-CM

## 2025-02-03 RX ORDER — CLONAZEPAM 2 MG/1
2 TABLET ORAL NIGHTLY PRN
Qty: 30 TABLET | Refills: 0 | Status: SHIPPED | OUTPATIENT
Start: 2025-02-03

## 2025-02-09 DIAGNOSIS — F33.41 RECURRENT MAJOR DEPRESSIVE DISORDER, IN PARTIAL REMISSION: Chronic | ICD-10-CM

## 2025-02-09 DIAGNOSIS — F33.2 SEVERE EPISODE OF RECURRENT MAJOR DEPRESSIVE DISORDER, WITHOUT PSYCHOTIC FEATURES: ICD-10-CM

## 2025-02-10 RX ORDER — ATORVASTATIN CALCIUM 20 MG/1
20 TABLET, FILM COATED ORAL DAILY
Qty: 90 TABLET | Refills: 1 | Status: SHIPPED | OUTPATIENT
Start: 2025-02-10

## 2025-02-10 RX ORDER — ARIPIPRAZOLE 2 MG/1
2 TABLET ORAL DAILY
Qty: 90 TABLET | Refills: 1 | Status: SHIPPED | OUTPATIENT
Start: 2025-02-10

## 2025-02-10 RX ORDER — VORTIOXETINE 20 MG/1
20 TABLET, FILM COATED ORAL DAILY
Qty: 90 TABLET | Refills: 0 | Status: SHIPPED | OUTPATIENT
Start: 2025-02-10

## 2025-02-12 ENCOUNTER — OFFICE VISIT (OUTPATIENT)
Dept: FAMILY MEDICINE CLINIC | Facility: CLINIC | Age: 60
End: 2025-02-12
Payer: COMMERCIAL

## 2025-02-12 VITALS
WEIGHT: 292 LBS | HEIGHT: 70 IN | SYSTOLIC BLOOD PRESSURE: 134 MMHG | HEART RATE: 91 BPM | RESPIRATION RATE: 18 BRPM | OXYGEN SATURATION: 98 % | DIASTOLIC BLOOD PRESSURE: 78 MMHG | BODY MASS INDEX: 41.8 KG/M2

## 2025-02-12 DIAGNOSIS — R10.30 LOWER ABDOMINAL PAIN: Primary | ICD-10-CM

## 2025-02-12 PROCEDURE — 99213 OFFICE O/P EST LOW 20 MIN: CPT | Performed by: NURSE PRACTITIONER

## 2025-02-12 NOTE — PROGRESS NOTES
"Chief Complaint  Abdominal Pain (Lower abdominal pain, pain started yesterday. More on right side)  Subjective        Choco Bryant presents to [facility]  History of Present Illness  Pt comes in today with c/o right lower abd pain that started yesterday.  Waxes and wanes  No NVDC   No fever or chills.  Pain ranges from 2-6. Nothing seems to aggravate it   Nothing seems to make it better  When pain flares up only lasts 5-10 seconds  Having normal bowel movements.  No urinary issues.    Abdominal Pain      Review of Systems   Gastrointestinal:  Positive for abdominal pain.     Objective     Vital Signs:   /78   Pulse 91   Resp 18   Ht 177.8 cm (70\")   Wt 132 kg (292 lb)   SpO2 98%   BMI 41.90 kg/m²       BP Readings from Last 3 Encounters:   02/12/25 134/78   10/23/24 132/80   09/03/24 138/97       Wt Readings from Last 3 Encounters:   02/12/25 132 kg (292 lb)   10/23/24 128 kg (283 lb 3.2 oz)   09/03/24 125 kg (276 lb 3.2 oz)     Physical Exam  Constitutional:       Appearance: He is well-developed. He is obese.   Eyes:      Pupils: Pupils are equal, round, and reactive to light.   Cardiovascular:      Rate and Rhythm: Normal rate and regular rhythm.   Pulmonary:      Effort: Pulmonary effort is normal.      Breath sounds: Normal breath sounds.   Abdominal:      General: Bowel sounds are normal.      Palpations: Abdomen is soft.      Tenderness: There is no abdominal tenderness. There is no guarding. Negative signs include Calel's sign, Rovsing's sign, McBurney's sign and psoas sign.   Neurological:      Mental Status: He is alert and oriented to person, place, and time.        Result Review :                 Assessment and Plan    Diagnoses and all orders for this visit:    1. Lower abdominal pain (Primary)    Discussed potential causes  Discussed getting a ct scan, but since his exam was essentially negative will watch and wait. If no improvement in the next couple of " days, pt will message me for order for CT abd/pelvis.   During this office visit, we discussed the pertinent aspects of the visit and treatment recommendations. Pt verbalizes understanding. Follow up was discussed. Patient was given the opportunity to ask questions and discuss other concerns.         Follow Up   Return if symptoms worsen or fail to improve.  Patient was given instructions and counseling regarding his condition or for health maintenance advice. Please see specific information pulled into the AVS if appropriate.

## 2025-03-10 DIAGNOSIS — G47.00 INSOMNIA, UNSPECIFIED TYPE: ICD-10-CM

## 2025-03-11 RX ORDER — CLONAZEPAM 2 MG/1
2 TABLET ORAL NIGHTLY PRN
Qty: 30 TABLET | Refills: 0 | Status: SHIPPED | OUTPATIENT
Start: 2025-03-11

## 2025-04-15 DIAGNOSIS — F33.2 SEVERE EPISODE OF RECURRENT MAJOR DEPRESSIVE DISORDER, WITHOUT PSYCHOTIC FEATURES: ICD-10-CM

## 2025-04-15 DIAGNOSIS — G47.00 INSOMNIA, UNSPECIFIED TYPE: ICD-10-CM

## 2025-04-15 RX ORDER — VORTIOXETINE 20 MG/1
20 TABLET, FILM COATED ORAL DAILY
Qty: 90 TABLET | Refills: 1 | Status: SHIPPED | OUTPATIENT
Start: 2025-04-15

## 2025-04-15 RX ORDER — CLONAZEPAM 2 MG/1
2 TABLET ORAL NIGHTLY PRN
Qty: 30 TABLET | Refills: 3 | Status: SHIPPED | OUTPATIENT
Start: 2025-04-15

## 2025-05-09 RX ORDER — TAMSULOSIN HYDROCHLORIDE 0.4 MG/1
1 CAPSULE ORAL DAILY
Qty: 90 CAPSULE | Refills: 1 | Status: SHIPPED | OUTPATIENT
Start: 2025-05-09

## 2025-05-20 DIAGNOSIS — G47.00 INSOMNIA, UNSPECIFIED TYPE: ICD-10-CM

## 2025-05-21 RX ORDER — CLONAZEPAM 2 MG/1
2 TABLET ORAL NIGHTLY PRN
Qty: 30 TABLET | Refills: 0 | Status: SHIPPED | OUTPATIENT
Start: 2025-05-21

## 2025-06-09 RX ORDER — TADALAFIL 5 MG/1
5 TABLET ORAL DAILY
Qty: 90 TABLET | Refills: 1 | Status: SHIPPED | OUTPATIENT
Start: 2025-06-09

## 2025-06-24 DIAGNOSIS — G47.00 INSOMNIA, UNSPECIFIED TYPE: ICD-10-CM

## 2025-06-25 RX ORDER — CLONAZEPAM 2 MG/1
2 TABLET ORAL NIGHTLY PRN
Qty: 30 TABLET | Refills: 1 | Status: SHIPPED | OUTPATIENT
Start: 2025-06-25

## 2025-07-28 ENCOUNTER — OFFICE VISIT (OUTPATIENT)
Dept: FAMILY MEDICINE CLINIC | Facility: CLINIC | Age: 60
End: 2025-07-28
Payer: COMMERCIAL

## 2025-07-28 VITALS
HEART RATE: 84 BPM | BODY MASS INDEX: 41.6 KG/M2 | RESPIRATION RATE: 16 BRPM | WEIGHT: 290.6 LBS | OXYGEN SATURATION: 94 % | SYSTOLIC BLOOD PRESSURE: 124 MMHG | DIASTOLIC BLOOD PRESSURE: 72 MMHG | HEIGHT: 70 IN

## 2025-07-28 DIAGNOSIS — F33.2 SEVERE EPISODE OF RECURRENT MAJOR DEPRESSIVE DISORDER, WITHOUT PSYCHOTIC FEATURES: ICD-10-CM

## 2025-07-28 DIAGNOSIS — G47.00 INSOMNIA, UNSPECIFIED TYPE: ICD-10-CM

## 2025-07-28 DIAGNOSIS — E66.813 CLASS 3 SEVERE OBESITY WITH SERIOUS COMORBIDITY AND BODY MASS INDEX (BMI) OF 40.0 TO 44.9 IN ADULT, UNSPECIFIED OBESITY TYPE: Primary | ICD-10-CM

## 2025-07-28 PROCEDURE — 99214 OFFICE O/P EST MOD 30 MIN: CPT | Performed by: NURSE PRACTITIONER

## 2025-07-28 RX ORDER — VORTIOXETINE 20 MG/1
20 TABLET, FILM COATED ORAL DAILY
Qty: 90 TABLET | Refills: 1 | Status: SHIPPED | OUTPATIENT
Start: 2025-07-28

## 2025-07-28 RX ORDER — FLUOROMETHOLONE 1 MG/ML
1 SUSPENSION/ DROPS OPHTHALMIC
COMMUNITY
Start: 2025-05-13

## 2025-07-28 RX ORDER — SEMAGLUTIDE 0.25 MG/.5ML
0.25 INJECTION, SOLUTION SUBCUTANEOUS WEEKLY
Qty: 2 ML | Refills: 0 | Status: SHIPPED | OUTPATIENT
Start: 2025-07-28 | End: 2025-07-30

## 2025-07-28 RX ORDER — CLONAZEPAM 2 MG/1
2 TABLET ORAL NIGHTLY PRN
Qty: 30 TABLET | Refills: 1 | Status: SHIPPED | OUTPATIENT
Start: 2025-07-28

## 2025-07-28 NOTE — PROGRESS NOTES
"Chief Complaint  Obesity  Subjective        Choco Bryant presents to [facility]  History of Present Illness  Pt comes in today wanting to discuss weight loss options.   Typical diet looks like   Breakfast: grape nuts with 2% milk   Lunch: sandwich (grilled cheese or ham/cheese)   Dinner: chicken, rice, or turkey, roast, vegetables   Snacks: chips at times   Drinks: water and/or milk. Drinking about 50-60 ounces/water per day.   Occasional regular soda (maybe 2-3 per week)   No alcohol   Feels he is consuming about 2500 calories/day     Exercise: biking about 3-5 days/week. Biking about 2-4 min/day.   No other exercises     He is wanting to try wegovy.   Obesity       Objective     Vital Signs:   /72   Pulse 84   Resp 16   Ht 177.8 cm (70\")   Wt 132 kg (290 lb 9.6 oz)   SpO2 94%   BMI 41.70 kg/m²       BP Readings from Last 3 Encounters:   07/28/25 124/72   02/12/25 134/78   10/23/24 132/80       Wt Readings from Last 3 Encounters:   07/28/25 132 kg (290 lb 9.6 oz)   02/12/25 132 kg (292 lb)   10/23/24 128 kg (283 lb 3.2 oz)     Physical Exam  Constitutional:       Appearance: He is well-developed. He is obese.   Eyes:      Pupils: Pupils are equal, round, and reactive to light.   Cardiovascular:      Rate and Rhythm: Normal rate and regular rhythm.   Pulmonary:      Effort: Pulmonary effort is normal.      Breath sounds: Normal breath sounds.   Neurological:      Mental Status: He is alert and oriented to person, place, and time.      Result Review :                 Assessment and Plan    Diagnoses and all orders for this visit:    1. Class 3 severe obesity with serious comorbidity and body mass index (BMI) of 40.0 to 44.9 in adult, unspecified obesity type (Primary)  -     Semaglutide-Weight Management (Wegovy) 0.25 MG/0.5ML solution auto-injector; Inject 0.5 mL under the skin into the appropriate area as directed 1 (One) Time Per Week.  Dispense: 2 mL; Refill: " 0    Will start wegovy  Long discussion with pt about diet and exercise goals  Recommend Myfitness pal andrea.   BMI is currenlty 41.7  Pt is committed to diet and increasing exercise. Goal is 150 min/week   Discussed importance of regular exercise and recommended starting or continuing a regular exercise program for good health. The patient was also encouraged to lose weight for better health.   During this office visit, we discussed the pertinent aspects of the visit and treatment recommendations. Pt verbalizes understanding. Follow up was discussed. Patient was given the opportunity to ask questions and discuss other concerns.         Zepbound/ Mounjaro ( tirzepatide), ozempic / wegovy ( semaglutide)   Patient education / teaching    PT was informed that not all insurances cover injectable weight loss/ diabetic medications.   Pt informed that if these  injectable weight loss/ diabetic medications are a covered benefit under their insurance plan, deductibles/ co -pays still apply.   Pt informed all injectable weight loss/ diabetic medications need a prior authorization.  Pt informed to allow  7-10 business days for this to be completed.  Pt informed that with  Wegovy/ ozempic and Mounjaro/ zepbound, each month they will need to inform the office when they have one dose left.  Pt informed that they need to inform the office of this at least 7 days in advance, and if they do not do this, this may cause a delay in my refill.   Pt informed to watch out for  side effects including any nausea or vomiting or abdominal pain, throat swelling, or tightness in my throat. Pt informed if they have these symptoms they need to call the office.   Pt attested  that they do not have any personal or family history of medullary thyroid cancer or multiple endocrine neoplasia type 2.  PT informed these are contraindications to taking these medications.   Pt informed that they will need to be seen at least every 3 months while taking  injectable weight loss medications. Pt informed that if they miss an appointment, this medication may not be refilled.         Follow Up   Return in about 3 months (around 10/28/2025) for weight management .  Patient was given instructions and counseling regarding his condition or for health maintenance advice. Please see specific information pulled into the AVS if appropriate.

## 2025-07-30 ENCOUNTER — TELEPHONE (OUTPATIENT)
Dept: FAMILY MEDICINE CLINIC | Facility: CLINIC | Age: 60
End: 2025-07-30
Payer: COMMERCIAL

## 2025-07-30 NOTE — TELEPHONE ENCOUNTER
Caller: Choco Bryant    Relationship: Self    Best call back number: [phone]    What medication are you requesting: SUBSTITUTE FOR THE WEGOVY, PHARMACY IS OUT OF STOCK    What are your current symptoms:     How long have you been experiencing symptoms:     Have you had these symptoms before:    [x] Yes  [] No    Have you been treated for these symptoms before:   [x] Yes  [] No    If a prescription is needed, what is your preferred pharmacy and phone number: [id number] - [facility] [address] AT [address] [facility] [phone] [facility] [phone]      Additional notes:

## 2025-08-02 ENCOUNTER — PATIENT MESSAGE (OUTPATIENT)
Dept: FAMILY MEDICINE CLINIC | Facility: CLINIC | Age: 60
End: 2025-08-02
Payer: COMMERCIAL

## 2025-08-04 RX ORDER — SEMAGLUTIDE 0.25 MG/.5ML
0.25 INJECTION, SOLUTION SUBCUTANEOUS WEEKLY
Qty: 2 ML | Refills: 0 | Status: SHIPPED | OUTPATIENT
Start: 2025-08-04

## (undated) DEVICE — ANTIBACTERIAL UNDYED BRAIDED (POLYGLACTIN 910), SYNTHETIC ABSORBABLE SUTURE: Brand: COATED VICRYL

## (undated) DEVICE — SKIN AFFIX SURG ADHESIVE 72/CS 0.55ML: Brand: MEDLINE

## (undated) DEVICE — 3M™ PATIENT PLATE, CORDED, SPLIT, LARGE, 40 PER CASE, 1179: Brand: 3M™

## (undated) DEVICE — SOL IRRIG H2O 1000ML STRL

## (undated) DEVICE — KT SURG TURNOVER 050

## (undated) DEVICE — SPNG GZ 2S 2X2 8PLY STRL PK/2

## (undated) DEVICE — NDL MAYO CAT GUT 1/2 CIR TPR

## (undated) DEVICE — UNDERGLV SURG BIOGEL INDICATOR LTX PF 7

## (undated) DEVICE — GLV SURG SENSICARE PI ORTHO SZ8.5 LF STRL

## (undated) DEVICE — NDL SUT CATGUT 1/2CIR TPR SZ3 2PK 1824-3D

## (undated) DEVICE — 3M™ IOBAN™ 2 ANTIMICROBIAL INCISE DRAPE 6650EZ: Brand: IOBAN™ 2

## (undated) DEVICE — GOWN,REINFORCE,POLY,SIRUS,BREATH SLV,XLG: Brand: MEDLINE

## (undated) DEVICE — INTENDED FOR TISSUE SEPARATION, AND OTHER PROCEDURES THAT REQUIRE A SHARP SURGICAL BLADE TO PUNCTURE OR CUT.: Brand: BARD-PARKER ® CARBON RIB-BACK BLADES

## (undated) DEVICE — UNDYED BRAIDED (POLYGLACTIN 910), SYNTHETIC ABSORBABLE SUTURE: Brand: COATED VICRYL

## (undated) DEVICE — ADHS SKIN PREMIERPRO EXOFIN TOPICAL HI/VISC .5ML

## (undated) DEVICE — SUT MNCRYL 4/0 PS2 27IN UD MCP426H

## (undated) DEVICE — CVR HNDL LT SURG ACCSSRY BLU STRL

## (undated) DEVICE — SOLUTION,WATER,IRRIGATION,1000ML,STERILE: Brand: MEDLINE

## (undated) DEVICE — GLV SURG SENSICARE PI ORTHO SZ8 LF STRL

## (undated) DEVICE — GLV SURG SENSICARE PI ORTHO SZ7.5 LF STRL

## (undated) DEVICE — NDL SUT CATGUT 1/2CIR TPR SZ5 2PK 1824-5D

## (undated) DEVICE — CUFF TOURNI 1BLADDER 1PRT 18IN STRL

## (undated) DEVICE — SUT ETHIB 0/0 MO6 I8IN CX45D

## (undated) DEVICE — SUT VIC 3/0 SH 27IN J416H

## (undated) DEVICE — TBG PENCL TELESCP MEGADYNE SMOKE EVAC 15FT

## (undated) DEVICE — GLV SURG SIGNATURE ESSENTIAL PF LTX SZ8.5

## (undated) DEVICE — GLV SURG BIOGEL LTX PF 7

## (undated) DEVICE — PK EXTREM 50

## (undated) DEVICE — SUT MONOCRYL 4/0 PS2 27IN Y426H ETY426H

## (undated) DEVICE — SOL IRRIG NACL 1000ML

## (undated) DEVICE — APPL CHLORAPREP HI/LITE TINTED 10.5ML ORNG

## (undated) DEVICE — Device

## (undated) DEVICE — PK MAJ LAPAROTOMY 50

## (undated) DEVICE — CUSTOM PACK: Brand: UNBRANDED

## (undated) DEVICE — PAD UNDERCAST WYTEX 4IN 4YD LF STRL

## (undated) DEVICE — UNDERGLV SURG BIOGEL/PI PF SYNTH SURG SZ8.5 BLU 50/BX

## (undated) DEVICE — DRSNG TELFA PAD NONADH STR 1S 3X4IN

## (undated) DEVICE — DRSNG SURESITE WNDW 4X4.5

## (undated) DEVICE — PAD UNDERCAST WYTEX 3IN 4YD LF STRL

## (undated) DEVICE — UNDERGLV SURG BIOGEL INDICAT PI SZ8 BLU